# Patient Record
Sex: MALE | Race: WHITE | NOT HISPANIC OR LATINO | Employment: OTHER | URBAN - METROPOLITAN AREA
[De-identification: names, ages, dates, MRNs, and addresses within clinical notes are randomized per-mention and may not be internally consistent; named-entity substitution may affect disease eponyms.]

---

## 2017-01-26 ENCOUNTER — LAB (OUTPATIENT)
Dept: INTERNAL MEDICINE | Facility: CLINIC | Age: 62
End: 2017-01-26

## 2017-01-26 DIAGNOSIS — Z12.5 PROSTATE CANCER SCREENING: ICD-10-CM

## 2017-01-26 DIAGNOSIS — Z00.00 HEALTHCARE MAINTENANCE: ICD-10-CM

## 2017-01-26 DIAGNOSIS — R73.01 IMPAIRED FASTING GLUCOSE: Primary | ICD-10-CM

## 2017-01-26 LAB
ARTICHOKE IGE QN: 117 MG/DL (ref 0–130)
BASOPHILS # BLD AUTO: 0.03 10*3/MM3 (ref 0–0.2)
BASOPHILS NFR BLD AUTO: 0.4 % (ref 0–1)
BILIRUB UR QL STRIP: NEGATIVE
CHOLEST SERPL-MCNC: 197 MG/DL (ref 0–200)
CLARITY UR: CLEAR
COLOR UR: YELLOW
DEPRECATED RDW RBC AUTO: 43.8 FL (ref 37–54)
EOSINOPHIL # BLD AUTO: 0.19 10*3/MM3 (ref 0.1–0.3)
EOSINOPHIL NFR BLD AUTO: 2.6 % (ref 0–3)
ERYTHROCYTE [DISTWIDTH] IN BLOOD BY AUTOMATED COUNT: 12.9 % (ref 11.3–14.5)
GLUCOSE UR STRIP-MCNC: NEGATIVE MG/DL
HBA1C MFR BLD: 5.4 % (ref 4.8–5.6)
HCT VFR BLD AUTO: 49.2 % (ref 38.9–50.9)
HCV AB SER DONR QL: NORMAL
HDLC SERPL-MCNC: 41 MG/DL (ref 40–60)
HGB BLD-MCNC: 16.4 G/DL (ref 13.1–17.5)
HGB UR QL STRIP.AUTO: NEGATIVE
IMM GRANULOCYTES # BLD: 0.02 10*3/MM3 (ref 0–0.03)
IMM GRANULOCYTES NFR BLD: 0.3 % (ref 0–0.6)
KETONES UR QL STRIP: NEGATIVE
LEUKOCYTE ESTERASE UR QL STRIP.AUTO: NEGATIVE
LYMPHOCYTES # BLD AUTO: 1.48 10*3/MM3 (ref 0.6–4.8)
LYMPHOCYTES NFR BLD AUTO: 20.5 % (ref 24–44)
MCH RBC QN AUTO: 31.1 PG (ref 27–31)
MCHC RBC AUTO-ENTMCNC: 33.3 G/DL (ref 32–36)
MCV RBC AUTO: 93.2 FL (ref 80–99)
MONOCYTES # BLD AUTO: 0.88 10*3/MM3 (ref 0–1)
MONOCYTES NFR BLD AUTO: 12.2 % (ref 0–12)
NEUTROPHILS # BLD AUTO: 4.61 10*3/MM3 (ref 1.5–8.3)
NEUTROPHILS NFR BLD AUTO: 64 % (ref 41–71)
NITRITE UR QL STRIP: NEGATIVE
PH UR STRIP.AUTO: 6.5 [PH] (ref 5–8)
PLATELET # BLD AUTO: 157 10*3/MM3 (ref 150–450)
PMV BLD AUTO: 11.7 FL (ref 6–12)
PROT UR QL STRIP: NEGATIVE
PSA SERPL-MCNC: 0.3 NG/ML (ref 0–4)
RBC # BLD AUTO: 5.28 10*6/MM3 (ref 4.2–5.76)
SP GR UR STRIP: <=1.005 (ref 1–1.03)
TRIGL SERPL-MCNC: 242 MG/DL (ref 0–150)
TSH SERPL DL<=0.05 MIU/L-ACNC: 3.43 MIU/ML (ref 0.35–5.35)
UROBILINOGEN UR QL STRIP: NORMAL
WBC NRBC COR # BLD: 7.21 10*3/MM3 (ref 3.5–10.8)

## 2017-01-26 PROCEDURE — 86803 HEPATITIS C AB TEST: CPT | Performed by: INTERNAL MEDICINE

## 2017-01-26 PROCEDURE — 81003 URINALYSIS AUTO W/O SCOPE: CPT | Performed by: INTERNAL MEDICINE

## 2017-01-26 PROCEDURE — 85025 COMPLETE CBC W/AUTO DIFF WBC: CPT | Performed by: INTERNAL MEDICINE

## 2017-01-26 PROCEDURE — 36415 COLL VENOUS BLD VENIPUNCTURE: CPT | Performed by: INTERNAL MEDICINE

## 2017-01-26 PROCEDURE — 80061 LIPID PANEL: CPT | Performed by: INTERNAL MEDICINE

## 2017-01-26 PROCEDURE — 84443 ASSAY THYROID STIM HORMONE: CPT | Performed by: INTERNAL MEDICINE

## 2017-01-26 PROCEDURE — G0103 PSA SCREENING: HCPCS | Performed by: INTERNAL MEDICINE

## 2017-01-26 PROCEDURE — 83036 HEMOGLOBIN GLYCOSYLATED A1C: CPT | Performed by: INTERNAL MEDICINE

## 2017-01-26 PROCEDURE — 80053 COMPREHEN METABOLIC PANEL: CPT | Performed by: INTERNAL MEDICINE

## 2017-01-26 NOTE — MR AVS SNAPSHOT
Yoseph rGanados   1/26/2017 8:00 AM   Lab    Dept Phone:  [phone]   Encounter #:  [id number]    Provider:  [facility]   Department:  [facility] [place]                Your Full Care Plan              Your Updated Medication List          This list is accurate as of: 1/26/17  8:00 AM.  Always use your most recent med list.                aspirin 81 MG tablet       CENTRUM SILVER ADULT 50+ PO       Cinnamon 500 MG tablet       fexofenadine 180 MG tablet   Commonly known as:  ALLEGRA       ibuprofen 600 MG tablet   Commonly known as:  ADVIL,MOTRIN       Omega-3 Krill Oil 1000 MG capsule       Vitamin D 2000 UNITS capsule               We Performed the Following     CBC & Differential     CBC Auto Differential     Hemoglobin A1c     Hepatitis C Antibody     Lipid Panel     PSA Screen     TSH     Urinalysis With / Microscopic If Indicated       You Were Diagnosed With        Codes Comments    Impaired fasting glucose    -  Primary ICD-10-CM: R73.01  ICD-9-CM: 790.21     Prostate cancer screening     ICD-10-CM: Z12.5  ICD-9-CM: V76.44     Healthcare maintenance     ICD-10-CM: Z00.00  ICD-9-CM: V70.0       Instructions     None    Patient Instructions History      Upcoming Appointments     Visit Type Date Time Department    LAB 1/26/2017  8:00 AM [facility]    [facility] 1/31/2017  9:30 [facility]      MyChart Signup     Our records indicate that you have declined [facility] signup. If you would like to sign up for [employer], please email [email or web address] or call [phone] to obtain an activation code.             Other Info from Your Visit           Your Appointments     Jan 31, 2017  9:30 AM EST   Physical with Gina Adamson MD   [facility] [place] (--)    [address] [address] [phone]           Arrive 15 minutes prior to appointment.                 Allergies     Cosamin Ds [Glucosamine-chondroitin]  Hives    Naproxen      Sulfa Antibiotics  Hives      Vital Signs     Smoking Status                   Never Smoker           Problems and Diagnoses Noted     Increased fasting blood sugar    Screening for prostate cancer        Routine medical exam          Results

## 2017-01-30 LAB
ALBUMIN SERPL-MCNC: 3.9 G/DL (ref 3.2–4.8)
ALBUMIN/GLOB SERPL: 2.2 G/DL (ref 1.5–2.5)
ALP SERPL-CCNC: 60 U/L (ref 25–100)
ALT SERPL W P-5'-P-CCNC: 18 U/L (ref 7–40)
ANION GAP SERPL CALCULATED.3IONS-SCNC: 15 MMOL/L (ref 3–11)
AST SERPL-CCNC: 24 U/L (ref 0–33)
BILIRUB SERPL-MCNC: 0.6 MG/DL (ref 0.3–1.2)
BUN BLD-MCNC: 19 MG/DL (ref 9–23)
BUN/CREAT SERPL: 19 (ref 7–25)
CALCIUM SPEC-SCNC: 9.5 MG/DL (ref 8.7–10.4)
CHLORIDE SERPL-SCNC: 105 MMOL/L (ref 99–109)
CO2 SERPL-SCNC: 26 MMOL/L (ref 20–31)
CREAT BLD-MCNC: 1 MG/DL (ref 0.6–1.3)
GFR SERPL CREATININE-BSD FRML MDRD: 75 ML/MIN/1.73
GLOBULIN UR ELPH-MCNC: 1.8 GM/DL
GLUCOSE BLD-MCNC: 95 MG/DL (ref 70–100)
POTASSIUM BLD-SCNC: 4.9 MMOL/L (ref 3.5–5.5)
PROT SERPL-MCNC: 5.7 G/DL (ref 5.7–8.2)
SODIUM BLD-SCNC: 146 MMOL/L (ref 132–146)

## 2017-01-31 ENCOUNTER — OFFICE VISIT (OUTPATIENT)
Dept: INTERNAL MEDICINE | Facility: CLINIC | Age: 62
End: 2017-01-31

## 2017-01-31 VITALS
HEIGHT: 69 IN | DIASTOLIC BLOOD PRESSURE: 74 MMHG | SYSTOLIC BLOOD PRESSURE: 118 MMHG | WEIGHT: 187 LBS | BODY MASS INDEX: 27.7 KG/M2

## 2017-01-31 DIAGNOSIS — R73.01 IMPAIRED FASTING GLUCOSE: ICD-10-CM

## 2017-01-31 DIAGNOSIS — M25.551 RIGHT HIP PAIN: ICD-10-CM

## 2017-01-31 DIAGNOSIS — E78.5 DYSLIPIDEMIA: ICD-10-CM

## 2017-01-31 DIAGNOSIS — Z00.00 PE (PHYSICAL EXAM), ROUTINE: Primary | ICD-10-CM

## 2017-01-31 PROCEDURE — 99396 PREV VISIT EST AGE 40-64: CPT | Performed by: INTERNAL MEDICINE

## 2017-01-31 PROCEDURE — 93000 ELECTROCARDIOGRAM COMPLETE: CPT | Performed by: INTERNAL MEDICINE

## 2017-01-31 NOTE — ASSESSMENT & PLAN NOTE
Health maintenance - flu vacc 12/16; Tdap 9/13, Zostavax 12/16; plan for pneumonia vaccines at age 63; colonosc due with Dr. George (last 6/12) - patient will call himself; defer FARNAZ with pending colonosc; eye exam UTD 4/16, done yearly with Dr. Aragon; dental exam UTD with every 6 mos cleanings, currently with f/u for dental implants

## 2017-01-31 NOTE — PROGRESS NOTES
Chief Complaint   Patient presents with   • Annual Exam       History of Present Illness  61 y.o.  gentleman presents for updated phys examination.    Review of Systems  Denies headaches, , CP, palpitations, SOB, cough, abd pain, n/v/d, difficulty with urination, numbness/tingling, falls, mood changes, lightheadedness, hearing changes, rashes.    ROS (+) for cont'd right hip and posterior thigh pain. Notes still worsened sxs with driving or with sitting with legs crossed.  Denies radiation of pain down into the foot and denies numbness/tingling associated.  Denies injuries or falls.  Notes no significant improvement with PT and dry needling.  Notes sxs more frequent and takes less to trigger now.  Reports weight gain, attributed to decreased exercise over the last 6 months.    ROS (+) for recent dental implant and has cont'd follow-up, next appt this afternoon.    Goes to the eye doctor yearly and they have been watching his eyelids, consider surgery for the future.     All other ROS reviewed and negative.    [facility]  The following portions of the patient's history were reviewed and updated as appropriate: allergies, current medications, past family history, past medical history, past social history, past surgical history and problem list.      Current Outpatient Prescriptions:   •  aspirin 81 MG tablet, Take 1 tablet by mouth daily., Disp: , Rfl:   •  Cholecalciferol (VITAMIN D) 2000 UNITS capsule, Take 1 capsule by mouth daily., Disp: , Rfl:   •  Cinnamon 500 MG tablet, Take 1 tablet by mouth daily., Disp: , Rfl:   •  fexofenadine (ALLEGRA) 180 MG tablet, Take 180 mg by mouth Daily., Disp: , Rfl:   •  ibuprofen (ADVIL,MOTRIN) 600 MG tablet, Take 1 tablet by mouth 2 (two) times a day as needed., Disp: , Rfl:   •  Multiple Vitamins-Minerals (CENTRUM SILVER ADULT 50+ PO), Take 1 tablet by mouth daily., Disp: , Rfl:   •  Omega-3 Krill Oil 1000 MG capsule, Take 1 tablet by mouth daily., Disp: , Rfl:     Visit  "Vitals   • /74   • Ht 69\" (175.3 cm)   • Wt 187 lb (84.8 kg)   • BMI 27.62 kg/m2       Physical Exam   Constitutional: He is oriented to person, place, and time. He appears well-developed and well-nourished.   HENT:   Head: Normocephalic.   Right Ear: Tympanic membrane normal.   Left Ear: Tympanic membrane normal.   Nose: Nose normal.   Mouth/Throat: Oropharynx is clear and moist and mucous membranes are normal. No oropharyngeal exudate.   Eyes: Conjunctivae are normal. Pupils are equal, round, and reactive to light.   Medial deviation left eye; mild upper lid ptosis bilaterally   Neck: Normal range of motion. Neck supple. Carotid bruit is not present. No thyromegaly present.   Cardiovascular: Normal rate, regular rhythm and normal heart sounds.    Pulmonary/Chest: Effort normal and breath sounds normal. No respiratory distress.   Abdominal: Soft. Bowel sounds are normal. He exhibits no distension and no mass. There is no hepatosplenomegaly. There is no tenderness.   Genitourinary:   Genitourinary Comments: /FARNAZ deferred as will get colonosc later this year   Musculoskeletal: Normal range of motion. He exhibits no edema or tenderness (no tend with palpation of the greater trochanters bilaterally).   bilat hips FROM; mild straight leg test on the right, asx on left; also right buttock/posterior thigh pain with crossed leg (right foot on left knee) and downwards pressure on the knee   Lymphadenopathy:     He has no cervical adenopathy.   Neurological: He is alert and oriented to person, place, and time. He has normal strength and normal reflexes. He displays normal reflexes. No cranial nerve deficit or sensory deficit.   Skin: Skin is warm and dry. No rash noted.   Psychiatric: He has a normal mood and affect. His behavior is normal.   Nursing note and vitals reviewed.        LABS  Results for orders placed or performed in visit on 01/26/17   Lipid Panel   Result Value Ref Range    Total Cholesterol 197 0 - " 200 mg/dL    Triglycerides 242 (H) 0 - 150 mg/dL    HDL Cholesterol 41 40 - 60 mg/dL    LDL Cholesterol  117 0 - 130 mg/dL   PSA Screen   Result Value Ref Range    PSA 0.300 0.000 - 4.000 ng/mL   TSH   Result Value Ref Range    TSH 3.429 0.350 - 5.350 mIU/mL   Urinalysis With / Microscopic If Indicated   Result Value Ref Range    Color, UA Yellow Yellow, Straw    Appearance, UA Clear Clear    pH, UA 6.5 5.0 - 8.0    Specific Gravity, UA <=1.005 1.005 - 1.030    Glucose, UA Negative Negative    Ketones, UA Negative Negative    Bilirubin, UA Negative Negative    Blood, UA Negative Negative    Protein, UA Negative Negative    Leuk Esterase, UA Negative Negative    Nitrite, UA Negative Negative    Urobilinogen, UA 0.2 E.U./dL 0.2 - 1.0 E.U./dL   Hemoglobin A1c   Result Value Ref Range    Hemoglobin A1C 5.40 4.80 - 5.60 %   Hepatitis C Antibody   Result Value Ref Range    Hepatitis C Ab Non-Reactive Non-Reactive   CBC Auto Differential   Result Value Ref Range    WBC 7.21 3.50 - 10.80 10*3/mm3    RBC 5.28 4.20 - 5.76 10*6/mm3    Hemoglobin 16.4 13.1 - 17.5 g/dL    Hematocrit 49.2 38.9 - 50.9 %    MCV 93.2 80.0 - 99.0 fL    MCH 31.1 (H) 27.0 - 31.0 pg    MCHC 33.3 32.0 - 36.0 g/dL    RDW 12.9 11.3 - 14.5 %    RDW-SD 43.8 37.0 - 54.0 fl    MPV 11.7 6.0 - 12.0 fL    Platelets 157 150 - 450 10*3/mm3    Neutrophil % 64.0 41.0 - 71.0 %    Lymphocyte % 20.5 (L) 24.0 - 44.0 %    Monocyte % 12.2 (H) 0.0 - 12.0 %    Eosinophil % 2.6 0.0 - 3.0 %    Basophil % 0.4 0.0 - 1.0 %    Immature Grans % 0.3 0.0 - 0.6 %    Neutrophils, Absolute 4.61 1.50 - 8.30 10*3/mm3    Lymphocytes, Absolute 1.48 0.60 - 4.80 10*3/mm3    Monocytes, Absolute 0.88 0.00 - 1.00 10*3/mm3    Eosinophils, Absolute 0.19 0.10 - 0.30 10*3/mm3    Basophils, Absolute 0.03 0.00 - 0.20 10*3/mm3    Immature Grans, Absolute 0.02 0.00 - 0.03 10*3/mm3   Comprehensive Metabolic Panel   Result Value Ref Range    Glucose 95 70 - 100 mg/dL    BUN 19 9 - 23 mg/dL    Creatinine  1.00 0.60 - 1.30 mg/dL    Sodium 146 132 - 146 mmol/L    Potassium 4.9 3.5 - 5.5 mmol/L    Chloride 105 99 - 109 mmol/L    CO2 26.0 20.0 - 31.0 mmol/L    Calcium 9.5 8.7 - 10.4 mg/dL    Total Protein 5.7 5.7 - 8.2 g/dL    Albumin 3.90 3.20 - 4.80 g/dL    ALT (SGPT) 18 7 - 40 U/L    AST (SGOT) 24 0 - 33 U/L    Alkaline Phosphatase 60 25 - 100 U/L    Total Bilirubin 0.6 0.3 - 1.2 mg/dL    eGFR Non African Amer 75 >60 mL/min/1.73    Globulin 1.8 gm/dL    A/G Ratio 2.2 1.5 - 2.5 g/dL    BUN/Creatinine Ratio 19.0 7.0 - 25.0    Anion Gap 15.0 (H) 3.0 - 11.0 mmol/L       ECG 12 Lead  Date/Time: 1/31/2017 10:15 AM  Performed by: CHERISE THOMPSON  Authorized by: CHERISE THOMPSON   Comparison: compared with previous ECG from 9/11/2015  Comparison to previous ECG: New IRBBB, note some artifact  Rhythm: sinus rhythm  Rate: normal  BPM: 70  Conduction: incomplete RBBB  ST Segments: ST segments normal  T Waves: T waves normal  QRS axis: normal  Clinical impression: non-specific ECG          ASSESSMENT/PLAN  Problem List Items Addressed This Visit     Impaired fasting glucose     BG control remains good with A1C 5.4; encouraged reg phys activity to decr insulin resistance, moderation in unhealthy starches/sweets; f/u A1C in 6 mos           Dyslipidemia     Bord lipids with worsened TGs but stable ; decr saturated fats/chol in the diet; repeat lipids in 6 mos         Relevant Orders    ECG 12 Lead    PE (physical exam), routine - Primary     Health maintenance - flu vacc 12/16; Tdap 9/13, Zostavax 12/16; plan for pneumonia vaccines at age 63; colonosc due with Dr. George (last 6/12) - patient will call himself; defer FARNAZ with pending colonosc; eye exam UTD 4/16, done yearly with Dr. Aragon; dental exam UTD with every 6 mos cleanings, currently with f/u for dental implants         Right hip pain     Ongoing sxs radiating into right thigh; s/p course of PT with dry needling without significant improvement; notes also component  of sciatica; rec prn ibuprofen; reviewed role of turmeric; proceed with imaging due to persistent/worsened sxs; patient opts to proceed with right hip MRI and then consider ortho consultation thereafter               FOLLOW-UP  1. Health maintenance - by his f/uappt, if colonosc not updated, will remind him to update colonosc with Dr. George  2. RTC 6 mos with A1C, lipids    Electronically signed by:    Gina Adamson MD  01/31/2017

## 2017-01-31 NOTE — ASSESSMENT & PLAN NOTE
Bord lipids with worsened TGs but stable ; decr saturated fats/chol in the diet; repeat lipids in 6 mos

## 2017-01-31 NOTE — MR AVS SNAPSHOT
Yoseph Granados   1/31/2017 9:30 AM   Office Visit    Dept Phone:  [phone]   Encounter #:  [id number]    Provider:  Gina Adamson MD   Department:  [facility]                Your Full Care Plan              Your Updated Medication List          This list is accurate as of: 1/31/17 10:37 AM.  Always use your most recent med list.                aspirin 81 MG tablet       CENTRUM SILVER ADULT 50+ PO       Cinnamon 500 MG tablet       fexofenadine 180 MG tablet   Commonly known as:  ALLEGRA       ibuprofen 600 MG tablet   Commonly known as:  ADVIL,MOTRIN       Omega-3 Krill Oil 1000 MG capsule       Vitamin D 2000 UNITS capsule               We Performed the Following     ECG 12 Lead       You Were Diagnosed With        Codes Comments    PE (physical exam), routine    -  Primary ICD-10-CM: Z00.00  ICD-9-CM: V70.0     Impaired fasting glucose     ICD-10-CM: R73.01  ICD-9-CM: 790.21     Dyslipidemia     ICD-10-CM: E78.5  ICD-9-CM: 272.4     Right hip pain     ICD-10-CM: M25.551  ICD-9-CM: 719.45       Instructions     None    Patient Instructions History      Upcoming Appointments     Visit Type Date Time Department    PHYSICAL 1/31/2017  9:30 AM [facility]    FOLLOW UP 7/28/2017 10:15 AM [facility]      MyChart Signup     Our records indicate that you have declined [facility] signup. If you would like to sign up for [facility], please email [facility] ([email or web address] or call [phone] to obtain an activation code.             Other Info from Your Visit           Your Appointments     Jul 28, 2017 10:15 AM EDT   Follow Up with Gina Adamson MD   [facility] [place] (--)    [address] [id number] [phone]           Arrive 15 minutes prior to appointment.            Feb 05, 2018  9:30 AM EST   Physical with Gina Adamson MD   [facility] (--)    [address] [facility]  [facility] [address] [phone]           Arrive 15 minutes prior to appointment.              Allergies     Cosamin Ds [Glucosamine-chondroitin]  Hives    Naproxen      Sulfa Antibiotics  Hives      Reason for Visit     Annual Exam           Vital Signs     Blood Pressure Height Weight Body Mass Index Smoking Status       118/74 69\" (175.3 cm) 187 lb (84.8 kg) 27.62 kg/m2 Never Smoker       Problems and Diagnoses Noted     Dyslipidemia    Increased fasting blood sugar    Routine medical exam    Right hip pain        "

## 2017-01-31 NOTE — ASSESSMENT & PLAN NOTE
BG control remains good with A1C 5.4; encouraged reg phys activity to decr insulin resistance, moderation in unhealthy starches/sweets; f/u A1C in 6 mos

## 2017-01-31 NOTE — ASSESSMENT & PLAN NOTE
Ongoing sxs radiating into right thigh; s/p course of PT with dry needling without significant improvement; notes also component of sciatica; rec prn ibuprofen; reviewed role of turmeric; proceed with imaging due to persistent/worsened sxs; patient opts to proceed with right hip MRI and then consider ortho consultation thereafter

## 2017-02-03 DIAGNOSIS — M25.551 RIGHT HIP PAIN: Primary | ICD-10-CM

## 2017-03-24 ENCOUNTER — OFFICE VISIT (OUTPATIENT)
Dept: INTERNAL MEDICINE | Facility: CLINIC | Age: 62
End: 2017-03-24

## 2017-03-24 ENCOUNTER — TELEPHONE (OUTPATIENT)
Dept: INTERNAL MEDICINE | Facility: CLINIC | Age: 62
End: 2017-03-24

## 2017-03-24 ENCOUNTER — HOSPITAL ENCOUNTER (OUTPATIENT)
Dept: CT IMAGING | Facility: HOSPITAL | Age: 62
Discharge: HOME | End: 2017-03-24
Attending: INTERNAL MEDICINE | Admitting: INTERNAL MEDICINE

## 2017-03-24 VITALS
TEMPERATURE: 97.6 F | DIASTOLIC BLOOD PRESSURE: 74 MMHG | RESPIRATION RATE: 12 BRPM | HEART RATE: 120 BPM | SYSTOLIC BLOOD PRESSURE: 120 MMHG | WEIGHT: 186 LBS | BODY MASS INDEX: 27.55 KG/M2 | OXYGEN SATURATION: 95 % | HEIGHT: 69 IN

## 2017-03-24 DIAGNOSIS — R06.02 SHORTNESS OF BREATH: Primary | ICD-10-CM

## 2017-03-24 DIAGNOSIS — R06.02 SHORTNESS OF BREATH: ICD-10-CM

## 2017-03-24 DIAGNOSIS — J06.9 VIRAL UPPER RESPIRATORY TRACT INFECTION: ICD-10-CM

## 2017-03-24 LAB
ANION GAP SERPL CALCULATED.3IONS-SCNC: 3 MMOL/L (ref 3–11)
BASOPHILS # BLD AUTO: 0.02 10*3/MM3 (ref 0–0.2)
BASOPHILS NFR BLD AUTO: 0.2 % (ref 0–1)
BUN BLD-MCNC: 27 MG/DL (ref 9–23)
BUN/CREAT SERPL: 22.5 (ref 7–25)
CALCIUM SPEC-SCNC: 9.1 MG/DL (ref 8.7–10.4)
CHLORIDE SERPL-SCNC: 103 MMOL/L (ref 99–109)
CO2 SERPL-SCNC: 29 MMOL/L (ref 20–31)
CREAT BLD-MCNC: 1.2 MG/DL (ref 0.6–1.3)
D DIMER PPP FEU-MCNC: 0.65 MG/L (FEU) (ref 0–0.5)
DEPRECATED RDW RBC AUTO: 44.6 FL (ref 37–54)
EOSINOPHIL # BLD AUTO: 0.01 10*3/MM3 (ref 0.1–0.3)
EOSINOPHIL NFR BLD AUTO: 0.1 % (ref 0–3)
ERYTHROCYTE [DISTWIDTH] IN BLOOD BY AUTOMATED COUNT: 13 % (ref 11.3–14.5)
GFR SERPL CREATININE-BSD FRML MDRD: 61 ML/MIN/1.73
GLUCOSE BLD-MCNC: 155 MG/DL (ref 70–100)
HCT VFR BLD AUTO: 57 % (ref 38.9–50.9)
HGB BLD-MCNC: 19.5 G/DL (ref 13.1–17.5)
IMM GRANULOCYTES # BLD: 0.04 10*3/MM3 (ref 0–0.03)
IMM GRANULOCYTES NFR BLD: 0.4 % (ref 0–0.6)
LYMPHOCYTES # BLD AUTO: 1.1 10*3/MM3 (ref 0.6–4.8)
LYMPHOCYTES NFR BLD AUTO: 9.8 % (ref 24–44)
MCH RBC QN AUTO: 31.9 PG (ref 27–31)
MCHC RBC AUTO-ENTMCNC: 34.2 G/DL (ref 32–36)
MCV RBC AUTO: 93.1 FL (ref 80–99)
MONOCYTES # BLD AUTO: 1.11 10*3/MM3 (ref 0–1)
MONOCYTES NFR BLD AUTO: 9.9 % (ref 0–12)
NEUTROPHILS # BLD AUTO: 8.96 10*3/MM3 (ref 1.5–8.3)
NEUTROPHILS NFR BLD AUTO: 79.6 % (ref 41–71)
PLATELET # BLD AUTO: 219 10*3/MM3 (ref 150–450)
PMV BLD AUTO: 12.7 FL (ref 6–12)
POTASSIUM BLD-SCNC: 4.8 MMOL/L (ref 3.5–5.5)
RBC # BLD AUTO: 6.12 10*6/MM3 (ref 4.2–5.76)
SODIUM BLD-SCNC: 135 MMOL/L (ref 132–146)
WBC NRBC COR # BLD: 11.24 10*3/MM3 (ref 3.5–10.8)

## 2017-03-24 PROCEDURE — 93000 ELECTROCARDIOGRAM COMPLETE: CPT | Performed by: INTERNAL MEDICINE

## 2017-03-24 PROCEDURE — 71275 CT ANGIOGRAPHY CHEST: CPT

## 2017-03-24 PROCEDURE — 82565 ASSAY OF CREATININE: CPT

## 2017-03-24 PROCEDURE — 80048 BASIC METABOLIC PNL TOTAL CA: CPT | Performed by: INTERNAL MEDICINE

## 2017-03-24 PROCEDURE — 99214 OFFICE O/P EST MOD 30 MIN: CPT | Performed by: INTERNAL MEDICINE

## 2017-03-24 PROCEDURE — 85025 COMPLETE CBC W/AUTO DIFF WBC: CPT | Performed by: INTERNAL MEDICINE

## 2017-03-24 PROCEDURE — 0 IOPAMIDOL PER 1 ML: Performed by: INTERNAL MEDICINE

## 2017-03-24 PROCEDURE — 85379 FIBRIN DEGRADATION QUANT: CPT | Performed by: INTERNAL MEDICINE

## 2017-03-24 RX ORDER — CEFDINIR 300 MG/1
300 CAPSULE ORAL 2 TIMES DAILY
Qty: 14 CAPSULE | Refills: 0 | Status: SHIPPED | OUTPATIENT
Start: 2017-03-24 | End: 2017-03-31

## 2017-03-24 RX ADMIN — IOPAMIDOL 75 ML: 755 INJECTION, SOLUTION INTRAVENOUS at 15:35

## 2017-03-24 NOTE — TELEPHONE ENCOUNTER
----- Message from Janie Chavez sent at 3/24/2017 10:52 AM EDT -----  Contact: SPOUSE  MRS PERDUE HAS CALLED ONCE AGAIN TO SEE IF HER  CAN BE SEEN TODAY  ----- Message -----     From: Janie Chavez     Sent: 3/24/2017   9:06 AM       To: Shaneka Rao MA    RE: APPT TODAY    MRS PERDUE WOULD LIKE TO KNOW IF MR PERDUE CAN POSSIBLY BE SEEN TODAY.    HE IS C/O SHORTNESS OF BREATH, PANTING, SINUS SYMPTOMS, CHILLS, PAIN IN BACK OF HEAD. PLEASE RETURN CALL.    CALL BACK #[phone] CELL

## 2017-03-24 NOTE — ASSESSMENT & PLAN NOTE
Concerning for development of acute shortness of breath this morning with dyspnea on exertion; pulse ox, EKG and exam unremarkable except for sinus tachycardia; check labs including d-dimer on the way out the door but proceed with CT chest to rule out pulmonary embolus; will treat accordingly if he has pneumonia on imaging

## 2017-03-24 NOTE — PROGRESS NOTES
Chief Complaint   Patient presents with   • Dizziness, sinus pressure and drainage, shortness of breath       History of Present Illness  62 y.o.  gentleman, accompanied by his wife, presents for further eval of cold sxs.  HPI started on Monday with sneezing, runny nose, and watery eyes.  Has been taking nyquil, dayquil, and benadryl.  This morning awakened developed numbness at the back of the head with dizziness, lightheadedness, and nausea.  Notes breathlessness with trying to fix breakfast.  Denies spinning dizziness but feels unsteady, but not quite like passing out feeling.  Has not passed out.  Heavy breathing is worsened with walking, better when sitting. Notes only very little cough. Notes runny nose is minimal today.  Has lower voice today.  Denies fevers, abd pain, CP, palpitations, diarrhea.  Notes occ feeling ear fullness on one side or the other; inconsistent.     Notes getting breathless/winded just with walking from lobby to exam room, which is out of the ordinary.  Was not symptomatic like this yesterday.    Denies any recent long trips or car rides.  Has not taking any OTC decongestants.  Reports plain allegra.    Review of Systems  ROS (+) for SOB, RIDDLE with slight cough.  Not coughing up blood or mucus.  Denies fevers, chills, CP, palpitations.  ROS (+) for dizziness, lightheadedness, discomfort at the back of the head, not feeling well in general.  Notes only slight sore throat, runny nose, congestion, ear fullness, not severely symptomatic at this time.  All other ROS reviewed and negative.    [facility]  The following portions of the patient's history were reviewed and updated as appropriate: allergies, current medications, past family history, past medical history, past social history, past surgical history and problem list.      Current Outpatient Prescriptions:   •  aspirin 81 MG tablet, Take 1 tablet by mouth daily., Disp: , Rfl:   •  Cholecalciferol (VITAMIN D) 2000 UNITS capsule, Take  "1 capsule by mouth daily., Disp: , Rfl:   •  Cinnamon 500 MG tablet, Take 1 tablet by mouth daily., Disp: , Rfl:   •  fexofenadine (ALLEGRA) 180 MG tablet, Take 180 mg by mouth Daily., Disp: , Rfl:   •  ibuprofen (ADVIL,MOTRIN) 600 MG tablet, Take 1 tablet by mouth 2 (two) times a day as needed., Disp: , Rfl:   •  Multiple Vitamins-Minerals (CENTRUM SILVER ADULT 50+ PO), Take 1 tablet by mouth daily., Disp: , Rfl:   •  Omega-3 Krill Oil 1000 MG capsule, Take 1 tablet by mouth daily., Disp: , Rfl:     VITALS:  /74  Pulse 120  Temp 97.6 °F (36.4 °C)  Resp 12  Ht 69\" (175.3 cm)  Wt 186 lb (84.4 kg)  SpO2 95%  BMI 27.47 kg/m2    Physical Exam   Constitutional: He is oriented to person, place, and time. He appears well-developed and well-nourished.   HENT:   Right Ear: External ear normal.   Left Ear: External ear normal.   Mouth/Throat: No oropharyngeal exudate (posterior drainage without exudate, erythema, or swelling).   Nasal mucosa mod swollen bilaterally, pale; bilat TMs intact   Eyes: Conjunctivae are normal.   Wears glasses   Neck: Normal range of motion.   Cardiovascular: Regular rhythm and normal heart sounds.  Tachycardia present.    Rate 120   Pulmonary/Chest: Effort normal and breath sounds normal. No respiratory distress. He has no wheezes. He has no rales.   Speaks in complete sentences   Abdominal: Soft. Bowel sounds are normal.   Musculoskeletal: He exhibits tenderness (mild tenderness and spasm with palpation paraspinal cervical muscles bilaterally; no point tenderness with palpa of the c-spine vertebrae; neck FROM).   Lymphadenopathy:     He has no cervical adenopathy.   Neurological: He is alert and oriented to person, place, and time.   Psychiatric: He has a normal mood and affect. His behavior is normal.   Nursing note and vitals reviewed.    LABS  1/17 stable labs Hct 49.2      ECG 12 Lead  Date/Time: 3/24/2017 1:42 PM  Performed by: CHERISE THOMPSON  Authorized by: CHERISE THOMPSON "   Comparison: compared with previous ECG from 1/31/2017  Similar to previous ECG  Comparison to previous ECG: Except now tachycardic  Rhythm: sinus tachycardia  Rate: normal  BPM: 105  Conduction: conduction normal  Conduction: incomplete RBBB  ST Segments: ST segments normal  T Waves: T waves normal  QRS axis: normal  Other findings comments: nonspecific ST-T Changes, unchanged  Clinical impression comment: stable EKG except tachycardic              ASSESSMENT/PLAN  Problem List Items Addressed This Visit     Shortness of breath - Primary     Concerning for development of acute shortness of breath this morning with dyspnea on exertion; pulse ox, EKG and exam unremarkable except for sinus tachycardia; check labs including d-dimer on the way out the door but proceed with CT chest to rule out pulmonary embolus; will treat accordingly if he has pneumonia on imaging         Relevant Orders    CBC & Differential    D-dimer, Quantitative    Basic Metabolic Panel    CT Angiogram Chest With & Without Contrast    CBC Auto Differential      Other Visit Diagnoses     Viral upper respiratory tract infection        minimal sxs at this time; remains on allegra; drink plenty of fluids          FOLLOW-UP  1.  High acuity of care with concern regarding possible pulmonary embolism  2.  RTC depending on laboratory and CT chest results; PE was just done 1/31/17    Electronically signed by:    Gina Adamson MD  03/24/2017

## 2017-03-24 NOTE — TELEPHONE ENCOUNTER
· Called patient to notify him of chest CT results:     IMPRESSION:  1. No acute intrathoracic abnormality identified. There is an incidental  small pericardial effusion. Old healed granulomatous disease seen within  the chest.  2. No evidence of pulmonary embolism.    · Also reviewed lab results: stable BMP, bord D-dimer  WBC 3.50 - 10.80 10*3/mm3 11.24 (H)   RBC 4.20 - 5.76 10*6/mm3 6.12 (H)   Hemoglobin 13.1 - 17.5 g/dL 19.5 (H)   Hematocrit 38.9 - 50.9 % 57.0 (H)   MCV 80.0 - 99.0 fL 93.1   MCH 27.0 - 31.0 pg 31.9 (H)   MCHC 32.0 - 36.0 g/dL 34.2   RDW 11.3 - 14.5 % 13.0   RDW-SD 37.0 - 54.0 fl 44.6   MPV 6.0 - 12.0 fL 12.7 (H)   Platelets 150 - 450 10*3/mm3 219     · Patient states that he is feeling better at this moment; has been drinking lot of water.  Has less SOB and less dizziness.  Still has some heaviness at the back of the head but is decreased since this morning.  No other new sxs, including no headaches, visual changes, numbness/tingling or weakness in the arms/legs, confusion, fainting.  · Will empirically tx bronchitis/sinusitis with omnicef 300mg BID x 7 days (note bord WBC with 80% neutrophils)  · Continue to drink lots of water  · Discussed going to the ER should dizziness or posterior head heaviness worsens over the weekend; also if develops new neurologic sxs  · If continues to improve, instructed patient to call office on Monday to make a follow-up appointment    Patient verbalized understanding and had no other questions/concerns.

## 2017-03-27 ENCOUNTER — TELEPHONE (OUTPATIENT)
Dept: INTERNAL MEDICINE | Facility: CLINIC | Age: 62
End: 2017-03-27

## 2017-03-27 LAB — CREAT BLDA-MCNC: 1.4 MG/DL (ref 0.6–1.3)

## 2017-03-27 NOTE — TELEPHONE ENCOUNTER
----- Message from Gina Adamson MD sent at 3/27/2017  9:38 AM EDT -----  Contact: PATIENT   aggressive water intake; cont his allegra and add nasal steroid spray (flonase, nasacort, or rhinocort are OTC options) - 2sprays/nostril qam; nasal saline rinse 2-3x/day (not afrin)    Need f/u appt this week  ----- Message -----     From: Shaneka Avelar MA     Sent: 3/27/2017   8:52 AM       To: Gina Adamson MD        ----- Message -----     From: Janie Chavez     Sent: 3/27/2017   8:41 AM       To: Shaneka Avealr MA    MR SIMPSON CALLED THIS MORNING TO MAKE AN APPT THIS WEEK TO FOLLOW UP WITH DR ADAMSON. HE IS STILL C/O LIGHT HEADEDNESS AND CONGESTION. HE WOULD LIKE TO KNOW IF THERE IS ANYTHING HE SHOULD BE TAKING ALONG WITH THE ABX. HIS UPCOMING APPT IS 3/30/2017 @ 10:45 AM.    CALL BACK #[phone]

## 2017-03-30 ENCOUNTER — OFFICE VISIT (OUTPATIENT)
Dept: INTERNAL MEDICINE | Facility: CLINIC | Age: 62
End: 2017-03-30

## 2017-03-30 VITALS
WEIGHT: 172 LBS | DIASTOLIC BLOOD PRESSURE: 76 MMHG | HEIGHT: 69 IN | SYSTOLIC BLOOD PRESSURE: 118 MMHG | BODY MASS INDEX: 25.48 KG/M2

## 2017-03-30 DIAGNOSIS — R06.02 SHORTNESS OF BREATH: ICD-10-CM

## 2017-03-30 DIAGNOSIS — R79.89 ELEVATED SERUM CREATININE: ICD-10-CM

## 2017-03-30 DIAGNOSIS — R73.01 IMPAIRED FASTING GLUCOSE: ICD-10-CM

## 2017-03-30 DIAGNOSIS — E78.5 DYSLIPIDEMIA: ICD-10-CM

## 2017-03-30 DIAGNOSIS — J01.90 ACUTE NON-RECURRENT SINUSITIS, UNSPECIFIED LOCATION: Primary | ICD-10-CM

## 2017-03-30 DIAGNOSIS — J30.2 SEASONAL ALLERGIC RHINITIS, UNSPECIFIED ALLERGIC RHINITIS TRIGGER: ICD-10-CM

## 2017-03-30 PROCEDURE — 99213 OFFICE O/P EST LOW 20 MIN: CPT | Performed by: INTERNAL MEDICINE

## 2017-04-09 PROBLEM — M16.11 PRIMARY OSTEOARTHRITIS OF RIGHT HIP: Status: ACTIVE | Noted: 2017-04-09

## 2017-07-21 ENCOUNTER — LAB (OUTPATIENT)
Dept: INTERNAL MEDICINE | Facility: CLINIC | Age: 62
End: 2017-07-21

## 2017-07-21 DIAGNOSIS — R73.01 IMPAIRED FASTING GLUCOSE: ICD-10-CM

## 2017-07-21 DIAGNOSIS — E78.5 DYSLIPIDEMIA: ICD-10-CM

## 2017-07-21 DIAGNOSIS — R79.89 ELEVATED SERUM CREATININE: ICD-10-CM

## 2017-07-21 LAB
ANION GAP SERPL CALCULATED.3IONS-SCNC: 10 MMOL/L (ref 3–11)
ARTICHOKE IGE QN: 119 MG/DL (ref 0–130)
BUN BLD-MCNC: 19 MG/DL (ref 9–23)
BUN/CREAT SERPL: 21.1 (ref 7–25)
CALCIUM SPEC-SCNC: 9.4 MG/DL (ref 8.7–10.4)
CHLORIDE SERPL-SCNC: 104 MMOL/L (ref 99–109)
CHOLEST SERPL-MCNC: 180 MG/DL (ref 0–200)
CO2 SERPL-SCNC: 26 MMOL/L (ref 20–31)
CREAT BLD-MCNC: 0.9 MG/DL (ref 0.6–1.3)
GFR SERPL CREATININE-BSD FRML MDRD: 85 ML/MIN/1.73
GLUCOSE BLD-MCNC: 87 MG/DL (ref 70–100)
HBA1C MFR BLD: 5.1 % (ref 4.8–5.6)
HDLC SERPL-MCNC: 50 MG/DL (ref 40–60)
POTASSIUM BLD-SCNC: 4.3 MMOL/L (ref 3.5–5.5)
SODIUM BLD-SCNC: 140 MMOL/L (ref 132–146)
TRIGL SERPL-MCNC: 98 MG/DL (ref 0–150)

## 2017-07-21 PROCEDURE — 80048 BASIC METABOLIC PNL TOTAL CA: CPT | Performed by: INTERNAL MEDICINE

## 2017-07-21 PROCEDURE — 83036 HEMOGLOBIN GLYCOSYLATED A1C: CPT | Performed by: INTERNAL MEDICINE

## 2017-07-21 PROCEDURE — 80061 LIPID PANEL: CPT | Performed by: INTERNAL MEDICINE

## 2017-07-24 PROBLEM — S92.501A CLOSED FRACTURE OF PHALANX OF LESSER TOE OF RIGHT FOOT: Status: ACTIVE | Noted: 2017-07-24

## 2017-07-28 ENCOUNTER — OFFICE VISIT (OUTPATIENT)
Dept: INTERNAL MEDICINE | Facility: CLINIC | Age: 62
End: 2017-07-28

## 2017-07-28 VITALS
HEIGHT: 69 IN | DIASTOLIC BLOOD PRESSURE: 70 MMHG | WEIGHT: 177 LBS | BODY MASS INDEX: 26.22 KG/M2 | SYSTOLIC BLOOD PRESSURE: 122 MMHG

## 2017-07-28 DIAGNOSIS — R73.01 IMPAIRED FASTING GLUCOSE: Primary | ICD-10-CM

## 2017-07-28 DIAGNOSIS — E78.5 DYSLIPIDEMIA: ICD-10-CM

## 2017-07-28 DIAGNOSIS — N28.9 RENAL INSUFFICIENCY: ICD-10-CM

## 2017-07-28 PROCEDURE — 99214 OFFICE O/P EST MOD 30 MIN: CPT | Performed by: INTERNAL MEDICINE

## 2017-07-28 NOTE — PROGRESS NOTES
"Chief Complaint   Patient presents with   • Follow-up     Dyslipidemia, impaired fasting glucose        History of Present Illness  62 y.o.  gentleman presents for sugar and cholesterol follow-up.  Notes 6 weeks of decreased exercise when he broke toe (compression fx) of foot that was operated on previously.  Has resumed 6-mi walks and biking.     Review of Systems  ROS neg for CP, palpitations, SOB, lightheadedness.  ROS (+) for resolving toe fracture.    Reports colonosc on Monday that showed a diminutive polyp, path pending. All other ROS reviewed and negative.    [facility]  The following portions of the patient's history were reviewed and updated as appropriate: allergies, current medications, past family history, past medical history, past social history, past surgical history and problem list.      Current Outpatient Prescriptions:   •  aspirin 81 MG tablet, Take 1 tablet by mouth daily., Disp: , Rfl:   •  Cholecalciferol (VITAMIN D) 2000 UNITS capsule, Take 1 capsule by mouth daily., Disp: , Rfl:   •  Cinnamon 500 MG tablet, Take 1 tablet by mouth daily., Disp: , Rfl:   •  fexofenadine (ALLEGRA) 180 MG tablet, Take 180 mg by mouth Daily., Disp: , Rfl:   •  ibuprofen (ADVIL,MOTRIN) 600 MG tablet, Take 1 tablet by mouth 2 (two) times a day as needed., Disp: , Rfl:   •  Multiple Vitamins-Minerals (CENTRUM SILVER ADULT 50+ PO), Take 1 tablet by mouth daily., Disp: , Rfl:   •  Omega-3 Krill Oil 1000 MG capsule, Take 1 tablet by mouth daily., Disp: , Rfl:   •  Triamcinolone Acetonide (NASACORT AQ NA), into each nostril., Disp: , Rfl:     VITALS:  /70  Ht 69\" (175.3 cm)  Wt 177 lb (80.3 kg)  BMI 26.14 kg/m2    Physical Exam   Constitutional: He is oriented to person, place, and time. He appears well-developed and well-nourished.   Eyes: Conjunctivae and EOM are normal.   Glasses, baseline strabismus   Cardiovascular: Normal rate, regular rhythm and normal heart sounds.    Pulmonary/Chest: Effort normal " and breath sounds normal.   Abdominal: Soft. Bowel sounds are normal.   Neurological: He is alert and oriented to person, place, and time.   Psychiatric: He has a normal mood and affect. His behavior is normal.   Nursing note and vitals reviewed.      LABS  Results for orders placed or performed in visit on 07/21/17   Basic Metabolic Panel   Result Value Ref Range    Glucose 87 70 - 100 mg/dL    BUN 19 9 - 23 mg/dL    Creatinine 0.90 0.60 - 1.30 mg/dL    Sodium 140 132 - 146 mmol/L    Potassium 4.3 3.5 - 5.5 mmol/L    Chloride 104 99 - 109 mmol/L    CO2 26.0 20.0 - 31.0 mmol/L    Calcium 9.4 8.7 - 10.4 mg/dL    eGFR Non African Amer 85 >60 mL/min/1.73    BUN/Creatinine Ratio 21.1 7.0 - 25.0    Anion Gap 10.0 3.0 - 11.0 mmol/L   Lipid Panel   Result Value Ref Range    Total Cholesterol 180 0 - 200 mg/dL    Triglycerides 98 0 - 150 mg/dL    HDL Cholesterol 50 40 - 60 mg/dL    LDL Cholesterol  119 0 - 130 mg/dL   Hemoglobin A1c   Result Value Ref Range    Hemoglobin A1C 5.10 4.80 - 5.60 %       ASSESSMENT/PLAN  Problem List Items Addressed This Visit     Impaired fasting glucose - Primary     BG control remains stable with A1C 5.1; encouraged reg phys activity to decr insulin resistance, moderation in unhealthy starches/sweets; f/u A1C in 6 mos           Dyslipidemia     Lipids improved, now all parameters at goal; no meds; decrease saturated fats and cholesterol in the diet; repeat lipids in 6 mos           Renal insufficiency     Renal function back to normal               FOLLOW-UP  1. Health maintenance - colonosc done 7/24/17 with Dr. George, await report and polyp path  2. RTC for next PE after 1/31/18; fasting labs the week prior to appt (CBC, CMP, TSH, lipids, UA/micro, PSA)    Electronically signed by:    Gina Adamson MD  07/28/2017

## 2017-07-28 NOTE — ASSESSMENT & PLAN NOTE
Lipids improved, now all parameters at goal; no meds; decrease saturated fats and cholesterol in the diet; repeat lipids in 6 mos

## 2017-07-28 NOTE — ASSESSMENT & PLAN NOTE
BG control remains stable with A1C 5.1; encouraged reg phys activity to decr insulin resistance, moderation in unhealthy starches/sweets; f/u A1C in 6 mos

## 2017-12-15 ENCOUNTER — TELEPHONE (OUTPATIENT)
Dept: INTERNAL MEDICINE | Facility: CLINIC | Age: 62
End: 2017-12-15

## 2017-12-15 NOTE — TELEPHONE ENCOUNTER
YESSI, MR PERDUE CALLED TO REPORT TO DR THOMPSON THAT HE IS EXPERIENCING FLUID BUILD UP AROUND HIS KNEE. HE STATES HE HAD INJURED THIS KNEE YEARS AGO AND THINKS THIS MAY BE RELATED TO THAT INJURY. HE STATES THAT HE IS HAVING SOME SWELLING AND WOULD LIKE TO BE ADVISED AT THIS POINT. CALL BACK [phone]

## 2017-12-16 NOTE — TELEPHONE ENCOUNTER
Needs OV for further evaluation.    In the interim, rec ice, elevation, and ibuprofen (if taking OTC, take 3-4 tabs 2-3x/day as needed w/food)

## 2017-12-19 ENCOUNTER — OFFICE VISIT (OUTPATIENT)
Dept: INTERNAL MEDICINE | Facility: CLINIC | Age: 62
End: 2017-12-19

## 2017-12-19 VITALS — WEIGHT: 180 LBS | BODY MASS INDEX: 26.58 KG/M2 | DIASTOLIC BLOOD PRESSURE: 72 MMHG | SYSTOLIC BLOOD PRESSURE: 116 MMHG

## 2017-12-19 DIAGNOSIS — M25.562 ACUTE PAIN OF LEFT KNEE: Primary | ICD-10-CM

## 2017-12-19 PROCEDURE — 90686 IIV4 VACC NO PRSV 0.5 ML IM: CPT | Performed by: INTERNAL MEDICINE

## 2017-12-19 PROCEDURE — 90471 IMMUNIZATION ADMIN: CPT | Performed by: INTERNAL MEDICINE

## 2017-12-19 PROCEDURE — 99213 OFFICE O/P EST LOW 20 MIN: CPT | Performed by: INTERNAL MEDICINE

## 2017-12-19 RX ORDER — VIT C/B6/B5/MAGNESIUM/HERB 173 50-5-6-5MG
500 CAPSULE ORAL DAILY
COMMUNITY

## 2017-12-19 NOTE — PROGRESS NOTES
"Chief Complaint   Patient presents with   • Left knee pain and swelling       History of Present Illness  62 y.o.  gentleman presents for further eval of left knee pain and swelling.  HPI started 1 wk ago Wednesday with awakening to left knee stiffness.  No injuries or falls.  Notes assoc'd swelling with tightness, with tight sensation at the back of the knee.  Swelling has persisted but \"background achiness\" waxes/wanes.  Has tried to elevate leg and has laid off of walking/biking exercise. Has not taken meds for pain/swelling.      Review of Systems  ROS (+) for left knee pain/swelling as above. All other ROS reviewed and negative.    [facility]  The following portions of the patient's history were reviewed and updated as appropriate: allergies, current medications, past family history, past medical history, past social history, past surgical history and problem list.      Current Outpatient Prescriptions:   •  Turmeric 500 MG capsule, QD  •  aspirin 81 MG tablet, QD  •  Cholecalciferol (VITAMIN D) 2000 UNITS QD  •  Cinnamon 500 MG tablet, QD  •  fexofenadine (ALLEGRA) 180 MG tablet, QD  •  ibuprofen (ADVIL,MOTRIN) 600 MG tablet, BID prn  •  Multiple Vitamins-Minerals (CENTRUM SILVER ADULT 50+ PO), QD  •  Omega-3 Krill Oil 1000 MG capsule, QD  •  Triamcinolone Acetonide (NASACORT AQ NA), 2sprays/nostril QD    VITALS:  /72  Wt 81.6 kg (180 lb)  BMI 26.58 kg/m2    Physical Exam   Constitutional: He is oriented to person, place, and time. He appears well-developed and well-nourished.   Eyes: Conjunctivae and EOM are normal.   Wears glasses   Cardiovascular: Normal rate, regular rhythm and normal heart sounds.    Pulmonary/Chest: Effort normal and breath sounds normal.   Musculoskeletal: He exhibits tenderness (mild discomfort and fullness with palpation of the left popliteal fossa; no warmth, no crepitus). He exhibits no edema (BLE) or deformity.   bilat knees FROM; neg Chandra's test; minimal swelling " lat to patella; bony landmarks present; normal gait   Neurological: He is alert and oriented to person, place, and time.   Skin:   Excoriation mid left ant shin   Psychiatric: He has a normal mood and affect. His behavior is normal.   Nursing note and vitals reviewed.      LABS  Results for orders placed or performed in visit on 07/21/17   Basic Metabolic Panel   Result Value Ref Range    Glucose 87 70 - 100 mg/dL    BUN 19 9 - 23 mg/dL    Creatinine 0.90 0.60 - 1.30 mg/dL    Sodium 140 132 - 146 mmol/L    Potassium 4.3 3.5 - 5.5 mmol/L    Chloride 104 99 - 109 mmol/L    CO2 26.0 20.0 - 31.0 mmol/L    Calcium 9.4 8.7 - 10.4 mg/dL    eGFR Non African Amer 85 >60 mL/min/1.73    BUN/Creatinine Ratio 21.1 7.0 - 25.0    Anion Gap 10.0 3.0 - 11.0 mmol/L   Lipid Panel   Result Value Ref Range    Total Cholesterol 180 0 - 200 mg/dL    Triglycerides 98 0 - 150 mg/dL    HDL Cholesterol 50 40 - 60 mg/dL    LDL Cholesterol  119 0 - 130 mg/dL   Hemoglobin A1c   Result Value Ref Range    Hemoglobin A1C 5.10 4.80 - 5.60 %       ASSESSMENT/PLAN  Problem List Items Addressed This Visit     None      Visit Diagnoses     Acute pain of left knee    -  Primary    discomfort/fullness behind knee; discussed inflamm process; Baker's cyst on ddx; rec ibuprofen 3-4 tid prn w/ food and wean off w/improvement; u/s if no better          FOLLOW-UP  1. Health maintenance - flu vacc given today  2. RTC for next PE 2/5/18 as scheduled; fasting labs the week prior to appt (CBC, CMP, TSH, lipids, UA/micro, PSA)    Electronically signed by:    Gina Adamson MD  12/19/2017

## 2017-12-26 ENCOUNTER — PATIENT MESSAGE (OUTPATIENT)
Dept: INTERNAL MEDICINE | Facility: CLINIC | Age: 62
End: 2017-12-26

## 2017-12-26 DIAGNOSIS — M25.562 ACUTE PAIN OF LEFT KNEE: Primary | ICD-10-CM

## 2017-12-27 NOTE — TELEPHONE ENCOUNTER
From: Yoseph Granados Jr.  To: Gina Adamson MD  Sent: 12/26/2017 9:30 AM EST  Subject: Visit Follow-Up Question    Dr. Adamson,  I wanted to follow-up with you on my knee symptoms identified on my appointment of 12/19/17. While it hasn't worsened, it hasn't dramatically improved. The tightness and pain behind the knee comes and goes, but seems to come on more later in the day. For awhile I was having a higher level of pain in the front of the knee, but that has subsided. The knee appears to continue to have some fluid build up in it. Overall my mobility isn't affected, although the knee gets a little stiff during flare-ups of my symptoms.    I am now taking the ibuprofen twice a day at a dosage of 600-800mg as directed during my last appointment.    Hope you are having a wonderful holidays! Please let me know at your convenience what our next treatment step should be. You mentioned an ultrasound scan may need to be done to look for a possible Baker's cyst.    Thanks,    Yoseph Granados

## 2017-12-28 ENCOUNTER — HOSPITAL ENCOUNTER (OUTPATIENT)
Dept: CARDIOLOGY | Facility: HOSPITAL | Age: 62
Discharge: HOME | End: 2017-12-28
Attending: INTERNAL MEDICINE | Admitting: INTERNAL MEDICINE

## 2017-12-28 ENCOUNTER — TELEPHONE (OUTPATIENT)
Dept: INTERNAL MEDICINE | Facility: CLINIC | Age: 62
End: 2017-12-28

## 2017-12-28 VITALS — BODY MASS INDEX: 26.66 KG/M2 | HEIGHT: 69 IN | WEIGHT: 180 LBS

## 2017-12-28 DIAGNOSIS — M25.562 ACUTE PAIN OF LEFT KNEE: ICD-10-CM

## 2017-12-28 PROBLEM — I80.02 SUPERFICIAL THROMBOPHLEBITIS OF LEFT LEG: Status: ACTIVE | Noted: 2017-12-28

## 2017-12-28 LAB
BH CV LOW VAS LEFT GREATER SAPH BK VESSEL: 1
BH CV LOW VAS LEFT LESSER SAPH VESSEL: 1
BH CV LOWER VASCULAR LEFT COMMON FEMORAL AUGMENT: NORMAL
BH CV LOWER VASCULAR LEFT COMMON FEMORAL COMPRESS: NORMAL
BH CV LOWER VASCULAR LEFT COMMON FEMORAL PHASIC: NORMAL
BH CV LOWER VASCULAR LEFT COMMON FEMORAL SPONT: NORMAL
BH CV LOWER VASCULAR LEFT DISTAL FEMORAL AUGMENT: NORMAL
BH CV LOWER VASCULAR LEFT DISTAL FEMORAL COMPRESS: NORMAL
BH CV LOWER VASCULAR LEFT DISTAL FEMORAL PHASIC: NORMAL
BH CV LOWER VASCULAR LEFT DISTAL FEMORAL SPONT: NORMAL
BH CV LOWER VASCULAR LEFT GASTRONEMIUS COMPRESS: NORMAL
BH CV LOWER VASCULAR LEFT GREATER SAPH AK COMPRESS: NORMAL
BH CV LOWER VASCULAR LEFT GREATER SAPH BK COMPRESS: NORMAL
BH CV LOWER VASCULAR LEFT LESSER SAPH COMPRESS: NORMAL
BH CV LOWER VASCULAR LEFT MID FEMORAL AUGMENT: NORMAL
BH CV LOWER VASCULAR LEFT MID FEMORAL COMPRESS: NORMAL
BH CV LOWER VASCULAR LEFT MID FEMORAL PHASIC: NORMAL
BH CV LOWER VASCULAR LEFT MID FEMORAL SPONT: NORMAL
BH CV LOWER VASCULAR LEFT PERONEAL COMPRESS: NORMAL
BH CV LOWER VASCULAR LEFT POPLITEAL AUGMENT: NORMAL
BH CV LOWER VASCULAR LEFT POPLITEAL COMPRESS: NORMAL
BH CV LOWER VASCULAR LEFT POPLITEAL PHASIC: NORMAL
BH CV LOWER VASCULAR LEFT POPLITEAL SPONT: NORMAL
BH CV LOWER VASCULAR LEFT POSTERIOR TIBIAL COMPRESS: NORMAL
BH CV LOWER VASCULAR LEFT PROFUNDA FEMORAL AUGMENT: NORMAL
BH CV LOWER VASCULAR LEFT PROFUNDA FEMORAL COMPRESS: NORMAL
BH CV LOWER VASCULAR LEFT PROFUNDA FEMORAL PHASIC: NORMAL
BH CV LOWER VASCULAR LEFT PROFUNDA FEMORAL SPONT: NORMAL
BH CV LOWER VASCULAR LEFT PROXIMAL FEMORAL AUGMENT: NORMAL
BH CV LOWER VASCULAR LEFT PROXIMAL FEMORAL COMPRESS: NORMAL
BH CV LOWER VASCULAR LEFT PROXIMAL FEMORAL PHASIC: NORMAL
BH CV LOWER VASCULAR LEFT PROXIMAL FEMORAL SPONT: NORMAL
BH CV LOWER VASCULAR LEFT SAPHENOFEMORAL JUNCTION COMPRESS: NORMAL
BH CV LOWER VASCULAR LEFT SAPHENOFEMORAL JUNCTION SPONT: NORMAL
BH CV LOWER VASCULAR RIGHT COMMON FEMORAL AUGMENT: NORMAL
BH CV LOWER VASCULAR RIGHT COMMON FEMORAL COMPRESS: NORMAL
BH CV LOWER VASCULAR RIGHT COMMON FEMORAL PHASIC: NORMAL
BH CV LOWER VASCULAR RIGHT COMMON FEMORAL SPONT: NORMAL

## 2017-12-28 PROCEDURE — 93971 EXTREMITY STUDY: CPT

## 2017-12-28 PROCEDURE — 93971 EXTREMITY STUDY: CPT | Performed by: INTERNAL MEDICINE

## 2017-12-28 NOTE — TELEPHONE ENCOUNTER
Received call from Vascular lab patient positive for superficial clot in left lower leg. Lab stated that it is in the superficial vein below the knee. Spoke with Dr Gonzalez informed patient to use warm compacts and to take  Aspirin 325 mg  daily. Patient scheduled for 1/14/17 @ 8. Please advise if OK to overbook for sooner apt. Patient voiced undrstanding

## 2017-12-29 NOTE — TELEPHONE ENCOUNTER
Schedule says current appt 1/16/18 at 8am.    Please advise regarding morning appointments for 1/4/18 (Thurs).  If there is an 8:15am patient, ok to schedule Mr. Granados at 8am (and just let me know).    If the other patient is indeed coming at 1215, then please put Mr. Granados at 8:30am (and let me know)

## 2017-12-29 NOTE — TELEPHONE ENCOUNTER
So sorry for the confusion patient has been RS for 1/4/18 at 8:30 Patients wife notified voiced understanding

## 2018-01-04 ENCOUNTER — OFFICE VISIT (OUTPATIENT)
Dept: INTERNAL MEDICINE | Facility: CLINIC | Age: 63
End: 2018-01-04

## 2018-01-04 VITALS
SYSTOLIC BLOOD PRESSURE: 130 MMHG | BODY MASS INDEX: 26.88 KG/M2 | OXYGEN SATURATION: 99 % | HEART RATE: 72 BPM | WEIGHT: 182 LBS | DIASTOLIC BLOOD PRESSURE: 76 MMHG

## 2018-01-04 DIAGNOSIS — Z00.00 ROUTINE HEALTH MAINTENANCE: ICD-10-CM

## 2018-01-04 DIAGNOSIS — I80.02 SUPERFICIAL THROMBOPHLEBITIS OF LEFT LEG: Primary | ICD-10-CM

## 2018-01-04 DIAGNOSIS — R73.01 IMPAIRED FASTING GLUCOSE: ICD-10-CM

## 2018-01-04 PROCEDURE — 99213 OFFICE O/P EST LOW 20 MIN: CPT | Performed by: INTERNAL MEDICINE

## 2018-01-04 NOTE — ASSESSMENT & PLAN NOTE
Reviewed results of venous duplex; reassurance not DVT; rec leg elevation, cont'd ASA qd; prn warm compresses; f/u as needed

## 2018-01-04 NOTE — PROGRESS NOTES
Chief Complaint   Patient presents with   • Follow-up     left knee pain       History of Present Illness  62 y.o.  gentleman presents for follow-up after dx of superficial thrombophlebitis by ultrasound for eval of left leg pain/swelling.  Reports decreased stiffness behind the knee and decreased pains around the knee.  Reports occ shooting pains at inner upper shin area that come and go.  Took down xmas decorations and had some increased pain.  Remains on 2 baby aspirin at baseline and has been using warm compresses.  Has not noticed any leg redness nor leg swelling.  R. Side is asx.    Review of Systems  ROS (+) for decreased left knee pain and stiffness.  Denies LE swelling, rashes, redness, heat. Denies CP, palpitations, SOB.  All other ROS reviewed and negative.    [facility]  The following portions of the patient's history were reviewed and updated as appropriate: allergies, current medications, past family history, past medical history, past social history, past surgical history and problem list.      Current Outpatient Prescriptions:   •  aspirin 81 MG tablet, Take 2 tablets by mouth Daily., Disp: , Rfl:   •  Cholecalciferol (VITAMIN D) 2000 UNITS capsule, Take 1 capsule by mouth daily., Disp: , Rfl:   •  Cinnamon 500 MG tablet, Take 1 tablet by mouth daily., Disp: , Rfl:   •  fexofenadine (ALLEGRA) 180 MG tablet, Take 180 mg by mouth Daily., Disp: , Rfl:   •  ibuprofen (ADVIL,MOTRIN) 600 MG tablet, Take 1 tablet by mouth 2 (two) times a day as needed., Disp: , Rfl:   •  Multiple Vitamins-Minerals (CENTRUM SILVER ADULT 50+ PO), Take 1 tablet by mouth daily., Disp: , Rfl:   •  Omega-3 Krill Oil 1000 MG capsule, Take 1 tablet by mouth daily., Disp: , Rfl:   •  Triamcinolone Acetonide (NASACORT AQ NA), into each nostril., Disp: , Rfl:   •  Turmeric 500 MG capsule, Take 500 mg by mouth Daily., Disp: , Rfl:     VITALS:  /76  Pulse 72  Wt 82.6 kg (182 lb)  SpO2 99%  BMI 26.88 kg/m2    Physical Exam    Constitutional: He is oriented to person, place, and time. He appears well-developed and well-nourished.   Eyes: Conjunctivae and EOM are normal.   Baseline dysconjugate gaze, wears glasses   Cardiovascular: Normal rate, regular rhythm and normal heart sounds.    2+ PT pulses bilaterally   Pulmonary/Chest: Effort normal and breath sounds normal.   Abdominal: Soft. Bowel sounds are normal.   Musculoskeletal: Normal range of motion. He exhibits no edema (BLE), tenderness or deformity (left knee FROM without swelling, erythema, warmth, tend with ROM).   Neurological: He is alert and oriented to person, place, and time.   Skin: Skin is warm and dry. No rash noted.   Psychiatric: He has a normal mood and affect. His behavior is normal.   Nursing note and vitals reviewed.      LABS  12/28/17 LLE venous duplex - acute superficial thrombophlebitis greater saph (below knee) and small saphenous    ASSESSMENT/PLAN  Problem List Items Addressed This Visit     Superficial thrombophlebitis of left leg - Primary     Reviewed results of venous duplex; reassurance not DVT; rec leg elevation, cont'd ASA qd; prn warm compresses; f/u as needed               FOLLOW-UP  1. Health maintenance - flu vacc 12/17  2. RTC for next PE 2/5/18 as scheduled; fasting labs the week prior to appt (CBC, CMP, TSH, lipids, UA/micro, PSA) - escribed    Electronically signed by:    Gina Adamson MD  01/04/2018

## 2018-01-30 ENCOUNTER — LAB (OUTPATIENT)
Dept: INTERNAL MEDICINE | Facility: CLINIC | Age: 63
End: 2018-01-30

## 2018-01-30 DIAGNOSIS — R73.01 IMPAIRED FASTING GLUCOSE: ICD-10-CM

## 2018-01-30 DIAGNOSIS — Z00.00 ROUTINE HEALTH MAINTENANCE: ICD-10-CM

## 2018-01-30 LAB
ALBUMIN SERPL-MCNC: 4.3 G/DL (ref 3.2–4.8)
ALBUMIN/GLOB SERPL: 2.5 G/DL (ref 1.5–2.5)
ALP SERPL-CCNC: 60 U/L (ref 25–100)
ALT SERPL W P-5'-P-CCNC: 21 U/L (ref 7–40)
AMORPH URATE CRY URNS QL MICRO: ABNORMAL /HPF
ANION GAP SERPL CALCULATED.3IONS-SCNC: 8 MMOL/L (ref 3–11)
ARTICHOKE IGE QN: 120 MG/DL (ref 0–130)
AST SERPL-CCNC: 18 U/L (ref 0–33)
BACTERIA UR QL AUTO: ABNORMAL /HPF
BASOPHILS # BLD AUTO: 0.03 10*3/MM3 (ref 0–0.2)
BASOPHILS NFR BLD AUTO: 0.4 % (ref 0–1)
BILIRUB SERPL-MCNC: 0.6 MG/DL (ref 0.3–1.2)
BILIRUB UR QL STRIP: NEGATIVE
BUN BLD-MCNC: 18 MG/DL (ref 9–23)
BUN/CREAT SERPL: 16.4 (ref 7–25)
CALCIUM SPEC-SCNC: 8.9 MG/DL (ref 8.7–10.4)
CHLORIDE SERPL-SCNC: 104 MMOL/L (ref 99–109)
CHOLEST SERPL-MCNC: 179 MG/DL (ref 0–200)
CLARITY UR: ABNORMAL
CO2 SERPL-SCNC: 28 MMOL/L (ref 20–31)
COLOR UR: ABNORMAL
CREAT BLD-MCNC: 1.1 MG/DL (ref 0.6–1.3)
DEPRECATED RDW RBC AUTO: 41.6 FL (ref 37–54)
EOSINOPHIL # BLD AUTO: 0.14 10*3/MM3 (ref 0–0.3)
EOSINOPHIL NFR BLD AUTO: 2 % (ref 0–3)
ERYTHROCYTE [DISTWIDTH] IN BLOOD BY AUTOMATED COUNT: 12.4 % (ref 11.3–14.5)
GFR SERPL CREATININE-BSD FRML MDRD: 67 ML/MIN/1.73
GLOBULIN UR ELPH-MCNC: 1.7 GM/DL
GLUCOSE BLD-MCNC: 87 MG/DL (ref 70–100)
GLUCOSE UR STRIP-MCNC: NEGATIVE MG/DL
HBA1C MFR BLD: 5.5 % (ref 4.8–5.6)
HCT VFR BLD AUTO: 48.9 % (ref 38.9–50.9)
HDLC SERPL-MCNC: 44 MG/DL (ref 40–60)
HGB BLD-MCNC: 16.6 G/DL (ref 13.1–17.5)
HGB UR QL STRIP.AUTO: NEGATIVE
IMM GRANULOCYTES # BLD: 0.01 10*3/MM3 (ref 0–0.03)
IMM GRANULOCYTES NFR BLD: 0.1 % (ref 0–0.6)
KETONES UR QL STRIP: NEGATIVE
LEUKOCYTE ESTERASE UR QL STRIP.AUTO: NEGATIVE
LYMPHOCYTES # BLD AUTO: 1.42 10*3/MM3 (ref 0.6–4.8)
LYMPHOCYTES NFR BLD AUTO: 20.5 % (ref 24–44)
MCH RBC QN AUTO: 31.4 PG (ref 27–31)
MCHC RBC AUTO-ENTMCNC: 33.9 G/DL (ref 32–36)
MCV RBC AUTO: 92.4 FL (ref 80–99)
MONOCYTES # BLD AUTO: 0.58 10*3/MM3 (ref 0–1)
MONOCYTES NFR BLD AUTO: 8.4 % (ref 0–12)
NEUTROPHILS # BLD AUTO: 4.76 10*3/MM3 (ref 1.5–8.3)
NEUTROPHILS NFR BLD AUTO: 68.6 % (ref 41–71)
NITRITE UR QL STRIP: NEGATIVE
PH UR STRIP.AUTO: 6 [PH] (ref 5–8)
PLATELET # BLD AUTO: 180 10*3/MM3 (ref 150–450)
PMV BLD AUTO: 11.4 FL (ref 6–12)
POTASSIUM BLD-SCNC: 4.3 MMOL/L (ref 3.5–5.5)
PROT SERPL-MCNC: 6 G/DL (ref 5.7–8.2)
PROT UR QL STRIP: NEGATIVE
PSA SERPL-MCNC: 0.45 NG/ML (ref 0–4)
RBC # BLD AUTO: 5.29 10*6/MM3 (ref 4.2–5.76)
RBC # UR: ABNORMAL /HPF
REF LAB TEST METHOD: ABNORMAL
SODIUM BLD-SCNC: 140 MMOL/L (ref 132–146)
SP GR UR STRIP: 1.03 (ref 1–1.03)
SQUAMOUS #/AREA URNS HPF: ABNORMAL /HPF
TRIGL SERPL-MCNC: 168 MG/DL (ref 0–150)
TSH SERPL DL<=0.05 MIU/L-ACNC: 2.74 MIU/ML (ref 0.35–5.35)
UROBILINOGEN UR QL STRIP: ABNORMAL
WBC NRBC COR # BLD: 6.94 10*3/MM3 (ref 3.5–10.8)
WBC UR QL AUTO: ABNORMAL /HPF

## 2018-01-30 PROCEDURE — 85025 COMPLETE CBC W/AUTO DIFF WBC: CPT | Performed by: INTERNAL MEDICINE

## 2018-01-30 PROCEDURE — 80053 COMPREHEN METABOLIC PANEL: CPT | Performed by: INTERNAL MEDICINE

## 2018-01-30 PROCEDURE — 81001 URINALYSIS AUTO W/SCOPE: CPT | Performed by: INTERNAL MEDICINE

## 2018-01-30 PROCEDURE — 84443 ASSAY THYROID STIM HORMONE: CPT | Performed by: INTERNAL MEDICINE

## 2018-01-30 PROCEDURE — G0103 PSA SCREENING: HCPCS | Performed by: INTERNAL MEDICINE

## 2018-01-30 PROCEDURE — 80061 LIPID PANEL: CPT | Performed by: INTERNAL MEDICINE

## 2018-01-30 PROCEDURE — 83036 HEMOGLOBIN GLYCOSYLATED A1C: CPT | Performed by: INTERNAL MEDICINE

## 2018-02-05 ENCOUNTER — OFFICE VISIT (OUTPATIENT)
Dept: INTERNAL MEDICINE | Facility: CLINIC | Age: 63
End: 2018-02-05

## 2018-02-05 VITALS
HEIGHT: 68 IN | DIASTOLIC BLOOD PRESSURE: 70 MMHG | RESPIRATION RATE: 16 BRPM | HEART RATE: 64 BPM | SYSTOLIC BLOOD PRESSURE: 142 MMHG | BODY MASS INDEX: 27.49 KG/M2 | WEIGHT: 181.38 LBS

## 2018-02-05 DIAGNOSIS — R03.0 ELEVATED BLOOD PRESSURE READING WITHOUT DIAGNOSIS OF HYPERTENSION: ICD-10-CM

## 2018-02-05 DIAGNOSIS — D17.1 LIPOMA OF TORSO: ICD-10-CM

## 2018-02-05 DIAGNOSIS — E78.5 DYSLIPIDEMIA: ICD-10-CM

## 2018-02-05 DIAGNOSIS — R73.01 IMPAIRED FASTING GLUCOSE: ICD-10-CM

## 2018-02-05 DIAGNOSIS — I80.02 SUPERFICIAL THROMBOPHLEBITIS OF LEFT LEG: ICD-10-CM

## 2018-02-05 DIAGNOSIS — Z00.00 PE (PHYSICAL EXAM), ROUTINE: Primary | ICD-10-CM

## 2018-02-05 PROCEDURE — 99396 PREV VISIT EST AGE 40-64: CPT | Performed by: INTERNAL MEDICINE

## 2018-02-05 PROCEDURE — 93000 ELECTROCARDIOGRAM COMPLETE: CPT | Performed by: INTERNAL MEDICINE

## 2018-02-05 NOTE — ASSESSMENT & PLAN NOTE
Health maintenance - flu vacc 12/17; Tdap 9/13, Zostavax 12/16; plan for pneumonia vaccines next yr; colonosc 7/17, repeat 2022 with Dr. George; PSA stable; eye exam pending 4/18 with Dr. Aragon; dental exam UTD every 6 mos; (+) seat belt use

## 2018-02-05 NOTE — ASSESSMENT & PLAN NOTE
Good BG control with A1C 5.5; encouraged reg phys activity to decr insulin resistance, moderation in unhealthy starches/sweets; f/u A1C in 6 mos

## 2018-02-05 NOTE — ASSESSMENT & PLAN NOTE
Multiple/diffuse on abdomen; scattered on arms per patient; benign and asx currently; derm - Dr. Amador

## 2018-02-05 NOTE — PROGRESS NOTES
"Chief Complaint   Patient presents with   • Annual Exam       History of Present Illness  62 y.o.  gentleman presents for updated phys examination.    Review of Systems  Denies headaches, visual changes, CP, palpitations, SOB, cough, abd pain, n/v/d, difficulty with urination, numbness/tingling, falls, mood changes, lightheadedness, hearing changes, rashes.    States decreased exercise; stationary bike has worn out and will be getting this fixed.    Notes only using nasacort prn; gets at [employer].    Notes some residual left knee ineer pain, mostly in the mornings and then resolves with activity.     All other ROS reviewed and negative.    [facility]  The following portions of the patient's history were reviewed and updated as appropriate: allergies, current medications, past family history, past medical history, past social history, past surgical history and problem list.  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    Current Outpatient Prescriptions:   •  aspirin 81 MG tablet, 2 QD  •  Cholecalciferol (VITAMIN D) 2000 UNITS QD  •  Cinnamon 500 MG tablet, QD   •  fexofenadine (ALLEGRA) 180 MG tablet, DQ  •  ibuprofen (ADVIL,MOTRIN) 600 MG tablet, BID prn  •  Multiple Vitamins-Minerals (CENTRUM SILVER ADULT 50+ PO), QD  •  Omega-3 Krill Oil 1000 MG capsule, QD  •  Triamcinolone Acetonide (NASACORT AQ NA), 2 sprays/nostril QD  •  Turmeric 500 MG capsule, QD    VITALS:  /70 (BP Location: Left arm, Patient Position: Sitting)  Pulse 64  Resp 16  Ht 172.7 cm (68\")  Wt 82.3 kg (181 lb 6 oz)  BMI 27.58 kg/m2    Physical Exam   Constitutional: He is oriented to person, place, and time. He appears well-developed and well-nourished.   HENT:   Head: Normocephalic.   Right Ear: External ear normal.   Left Ear: External ear normal.   Nose: Nose normal.   Mouth/Throat: Oropharynx is clear and moist and mucous membranes are normal. No oropharyngeal exudate. "   Eyes: Conjunctivae and EOM are normal. Pupils are equal, round, and reactive to light.   Stable dysconjugate gaze; wears glasses   Neck: Normal range of motion. Neck supple. Carotid bruit is not present (bilaterally). No thyromegaly present.   Cardiovascular: Normal rate, regular rhythm and normal heart sounds.    Pulmonary/Chest: Effort normal and breath sounds normal. No respiratory distress. He has no wheezes. He has no rales.   Abdominal: Soft. Bowel sounds are normal. He exhibits no distension and no mass. There is no hepatosplenomegaly. There is no tenderness.   Genitourinary:   Genitourinary Comments: Gu/FARNAZ deferred   Musculoskeletal: Normal range of motion. He exhibits no edema.   Lymphadenopathy:     He has no cervical adenopathy.   Neurological: He is alert and oriented to person, place, and time. He has normal reflexes. He displays normal reflexes. No cranial nerve deficit.   Skin: Skin is warm and dry. No rash noted.   Diffuse dime-dinesh sized SQ nodules at the upper abdomen, nontender, no skin changes   Psychiatric: He has a normal mood and affect. His behavior is normal.   Nursing note and vitals reviewed.        LABS  Results for orders placed or performed in visit on 01/30/18   Comprehensive Metabolic Panel   Result Value Ref Range    Glucose 87 70 - 100 mg/dL    BUN 18 9 - 23 mg/dL    Creatinine 1.10 0.60 - 1.30 mg/dL    Sodium 140 132 - 146 mmol/L    Potassium 4.3 3.5 - 5.5 mmol/L    Chloride 104 99 - 109 mmol/L    CO2 28.0 20.0 - 31.0 mmol/L    Calcium 8.9 8.7 - 10.4 mg/dL    Total Protein 6.0 5.7 - 8.2 g/dL    Albumin 4.30 3.20 - 4.80 g/dL    ALT (SGPT) 21 7 - 40 U/L    AST (SGOT) 18 0 - 33 U/L    Alkaline Phosphatase 60 25 - 100 U/L    Total Bilirubin 0.6 0.3 - 1.2 mg/dL    eGFR Non African Amer 67 >60 mL/min/1.73    Globulin 1.7 gm/dL    A/G Ratio 2.5 1.5 - 2.5 g/dL    BUN/Creatinine Ratio 16.4 7.0 - 25.0    Anion Gap 8.0 3.0 - 11.0 mmol/L   Lipid Panel   Result Value Ref Range    Total  Cholesterol 179 0 - 200 mg/dL    Triglycerides 168 (H) 0 - 150 mg/dL    HDL Cholesterol 44 40 - 60 mg/dL    LDL Cholesterol  120 0 - 130 mg/dL   TSH   Result Value Ref Range    TSH 2.744 0.350 - 5.350 mIU/mL   PSA Screen   Result Value Ref Range    PSA 0.450 0.000 - 4.000 ng/mL   Hemoglobin A1c   Result Value Ref Range    Hemoglobin A1C 5.50 4.80 - 5.60 %   CBC Auto Differential   Result Value Ref Range    WBC 6.94 3.50 - 10.80 10*3/mm3    RBC 5.29 4.20 - 5.76 10*6/mm3    Hemoglobin 16.6 13.1 - 17.5 g/dL    Hematocrit 48.9 38.9 - 50.9 %    MCV 92.4 80.0 - 99.0 fL    MCH 31.4 (H) 27.0 - 31.0 pg    MCHC 33.9 32.0 - 36.0 g/dL    RDW 12.4 11.3 - 14.5 %    RDW-SD 41.6 37.0 - 54.0 fl    MPV 11.4 6.0 - 12.0 fL    Platelets 180 150 - 450 10*3/mm3    Neutrophil % 68.6 41.0 - 71.0 %    Lymphocyte % 20.5 (L) 24.0 - 44.0 %    Monocyte % 8.4 0.0 - 12.0 %    Eosinophil % 2.0 0.0 - 3.0 %    Basophil % 0.4 0.0 - 1.0 %    Immature Grans % 0.1 0.0 - 0.6 %    Neutrophils, Absolute 4.76 1.50 - 8.30 10*3/mm3    Lymphocytes, Absolute 1.42 0.60 - 4.80 10*3/mm3    Monocytes, Absolute 0.58 0.00 - 1.00 10*3/mm3    Eosinophils, Absolute 0.14 0.00 - 0.30 10*3/mm3    Basophils, Absolute 0.03 0.00 - 0.20 10*3/mm3    Immature Grans, Absolute 0.01 0.00 - 0.03 10*3/mm3   Urinalysis - Urine, Clean Catch   Result Value Ref Range    Color, UA Dark Yellow (A) Yellow, Straw    Appearance, UA Turbid (A) Clear    pH, UA 6.0 5.0 - 8.0    Specific Gravity, UA 1.026 1.001 - 1.030    Glucose, UA Negative Negative    Ketones, UA Negative Negative    Bilirubin, UA Negative Negative    Blood, UA Negative Negative    Protein, UA Negative Negative    Leuk Esterase, UA Negative Negative    Nitrite, UA Negative Negative    Urobilinogen, UA 0.2 E.U./dL 0.2 - 1.0 E.U./dL   Urinalysis, Microscopic Only - Urine, Clean Catch   Result Value Ref Range    RBC, UA 3-6 (A) None Seen, 0-2 /HPF    WBC, UA 3-5 (A) None Seen /HPF    Bacteria, UA 3+ (A) None Seen, Trace /HPF     Squamous Epithelial Cells, UA 0-2 None Seen, 0-2 /HPF    Amorphous Crystals, UA Moderate/2+ None Seen /HPF    Methodology Automated Microscopy        ECG 12 Lead  Date/Time: 2/5/2018 10:00 AM  Performed by: CHERISE ADAMSON  Authorized by: CHERISE ADAMSON   Comparison: compared with previous ECG from 3/24/2017  Similar to previous ECG  Rhythm: sinus rhythm  Rate: normal  BPM: 71  Conduction: incomplete RBBB  ST Segments: ST segments normal  QRS axis: normal  Other findings comments: nonspecific ST-T changes  Clinical impression comment: stable EKG            ASSESSMENT/PLAN  Problem List Items Addressed This Visit     Impaired fasting glucose     Good BG control with A1C 5.5; encouraged reg phys activity to decr insulin resistance, moderation in unhealthy starches/sweets; f/u A1C in 6 mos           Dyslipidemia     Stable LDL at 120 with bord TGs 168; decrease saturated fats and cholesterol in the diet; currently no meds         Relevant Orders    ECG 12 Lead (Completed)    Lipoma     Multiple/diffuse on abdomen; scattered on arms per patient; benign and asx currently; derm - Dr. Marjorie WILKERSON (physical exam), routine - Primary     Health maintenance - flu vacc 12/17; Tdap 9/13, Zostavax 12/16; plan for pneumonia vaccines next yr; colonosc 7/17, repeat 2022 with Dr. George; PSA stable; eye exam pending 4/18 with Dr. Aragon; dental exam UTD every 6 mos; (+) seat belt use         Superficial thrombophlebitis of left leg     Resolved; notes occ left medial knee pain in the morning, attribute this to OA           Other Visit Diagnoses     Elevated blood pressure reading without diagnosis of hypertension        no h/o HTN; monitor blood pressure at home or local drugstores with goal < 130/80; resume biking exercise; low Na diet; f/u in 6 mos at latest            FOLLOW-UP  RTC 6 mos with A1C and for BP check    Electronically signed by:    Cherise Adamson MD  02/05/2018

## 2018-02-05 NOTE — ASSESSMENT & PLAN NOTE
Stable LDL at 120 with bord TGs 168; decrease saturated fats and cholesterol in the diet; currently no meds

## 2018-02-27 ENCOUNTER — HOSPITAL ENCOUNTER (OUTPATIENT)
Dept: GENERAL RADIOLOGY | Facility: HOSPITAL | Age: 63
Discharge: HOME | End: 2018-02-27
Attending: INTERNAL MEDICINE | Admitting: INTERNAL MEDICINE

## 2018-02-27 ENCOUNTER — OFFICE VISIT (OUTPATIENT)
Dept: INTERNAL MEDICINE | Facility: CLINIC | Age: 63
End: 2018-02-27

## 2018-02-27 VITALS
DIASTOLIC BLOOD PRESSURE: 80 MMHG | HEART RATE: 78 BPM | SYSTOLIC BLOOD PRESSURE: 142 MMHG | RESPIRATION RATE: 18 BRPM | WEIGHT: 179.13 LBS | BODY MASS INDEX: 27.24 KG/M2 | OXYGEN SATURATION: 96 %

## 2018-02-27 DIAGNOSIS — J45.41 MODERATE PERSISTENT ASTHMATIC BRONCHITIS WITH ACUTE EXACERBATION: Primary | ICD-10-CM

## 2018-02-27 DIAGNOSIS — J45.41 MODERATE PERSISTENT ASTHMATIC BRONCHITIS WITH ACUTE EXACERBATION: ICD-10-CM

## 2018-02-27 DIAGNOSIS — J30.2 CHRONIC SEASONAL ALLERGIC RHINITIS, UNSPECIFIED TRIGGER: ICD-10-CM

## 2018-02-27 PROBLEM — J45.901 ASTHMATIC BRONCHITIS WITH ACUTE EXACERBATION: Status: ACTIVE | Noted: 2018-02-27

## 2018-02-27 PROCEDURE — 71046 X-RAY EXAM CHEST 2 VIEWS: CPT

## 2018-02-27 PROCEDURE — 99214 OFFICE O/P EST MOD 30 MIN: CPT | Performed by: INTERNAL MEDICINE

## 2018-02-27 PROCEDURE — 94060 EVALUATION OF WHEEZING: CPT | Performed by: INTERNAL MEDICINE

## 2018-02-27 RX ORDER — BUDESONIDE AND FORMOTEROL FUMARATE DIHYDRATE 80; 4.5 UG/1; UG/1
AEROSOL RESPIRATORY (INHALATION)
Qty: 1 INHALER | Refills: 1 | Status: SHIPPED | OUTPATIENT
Start: 2018-02-27 | End: 2018-03-22 | Stop reason: DRUGHIGH

## 2018-03-01 NOTE — ASSESSMENT & PLAN NOTE
NEW DX, secondary to recent bronchial illness; CXR ordered with persistent cough;  PFTs done showing decreased FEV1 and FVC; empiric trial of symbicort 80/4.5 2puffs BID - gargle/spit after puffs #1, 1RF - reviewed how to use correctly and option of spacer use; f/u in 2-3 wks with f/u PFTs

## 2018-03-22 ENCOUNTER — OFFICE VISIT (OUTPATIENT)
Dept: INTERNAL MEDICINE | Facility: CLINIC | Age: 63
End: 2018-03-22

## 2018-03-22 VITALS
DIASTOLIC BLOOD PRESSURE: 70 MMHG | HEART RATE: 68 BPM | BODY MASS INDEX: 25.73 KG/M2 | SYSTOLIC BLOOD PRESSURE: 138 MMHG | WEIGHT: 169.25 LBS | RESPIRATION RATE: 18 BRPM

## 2018-03-22 DIAGNOSIS — R03.0 ELEVATED BLOOD PRESSURE READING WITHOUT DIAGNOSIS OF HYPERTENSION: ICD-10-CM

## 2018-03-22 DIAGNOSIS — J45.41 MODERATE PERSISTENT ASTHMATIC BRONCHITIS WITH ACUTE EXACERBATION: Primary | ICD-10-CM

## 2018-03-22 PROCEDURE — 94060 EVALUATION OF WHEEZING: CPT | Performed by: INTERNAL MEDICINE

## 2018-03-22 PROCEDURE — 99213 OFFICE O/P EST LOW 20 MIN: CPT | Performed by: INTERNAL MEDICINE

## 2018-03-22 RX ORDER — BUDESONIDE AND FORMOTEROL FUMARATE DIHYDRATE 160; 4.5 UG/1; UG/1
2 AEROSOL RESPIRATORY (INHALATION)
Qty: 1 INHALER | Refills: 5 | Status: SHIPPED | OUTPATIENT
Start: 2018-03-22 | End: 2018-08-06

## 2018-03-22 NOTE — ASSESSMENT & PLAN NOTE
Note normotensive readings at home 120s/70s; commended patient on healthy weight loss (has been watching portions, decreasing snacking, and increasing phys activity)

## 2018-03-22 NOTE — ASSESSMENT & PLAN NOTE
Improved with Symbicort 80/4.5, noting now nl PFTs; with some residual sxs (SOB//cough with talking), increase dose of symbicort 160/4.5 2 puffs BID, gargle/spit after puffs #1, 5RF

## 2018-03-22 NOTE — PROGRESS NOTES
Chief Complaint   Patient presents with   • Asthma     fu       History of Present Illness  63 y.o.  gentleman presents for follow-up re: coughing and chest tightness. Has had improvement in sxs with Symbicort inhaler with overall improved shortness of breath and decreased cough.  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  Notes some increased fatigue when used in conjunction with albuterol.    Home BP readings have 120s/70s.      Review of Systems  ROS (+) for decreased cough and chest tightness.  Denies wheezing, SOB, nasal congestion, ear sxs.  Denies fevers/chills, sweats.  All other ROS reviewed and negative.    [facility]  The following portions of the patient's history were reviewed and updated as appropriate: allergies, current medications, past family history, past medical history, past social history, past surgical history and problem list.    Current Outpatient Prescriptions:   •  aspirin 81 MG tablet, 2 QD  •  budesonide-formoterol (SYMBICORT) 80-4.5 2 puffs BID  •  Cholecalciferol (VITAMIN D) 2000 UNITS QD  •  Cinnamon 500 MG tablet, QD   •  fexofenadine (ALLEGRA) 180 MG tablet, QD  •  ibuprofen (ADVIL,MOTRIN) 600 MG tablet, prn  •  Multiple Vitamins-Minerals (CENTRUM SILVER ADULT 50+ PO), QD  •  Omega-3 Krill Oil 1000 MG capsule, qd  •  PROAIR  (90 Base) MCG/ACT inhaler, prn  •  Triamcinolone Acetonide (NASACORT AQ NA), 2 sprays/nostril QD  •  Turmeric 500 MG capsule, QD    VITALS:  /70 (BP Location: Right arm, Patient Position: Sitting)   Pulse 68   Resp 18   Wt 76.8 kg (169 lb 4 oz)   BMI 25.73 kg/m²     Physical Exam   Constitutional: He is oriented to person, place, and time. He appears well-developed and well-nourished.   Note 10-lb weight loss over last month   Eyes: Conjunctivae and EOM are normal.   Wears glasses   Cardiovascular: Normal rate, regular rhythm and normal heart sounds.    Pulmonary/Chest: Effort normal and  breath sounds normal. No respiratory distress. He has no wheezes. He has no rales.   Abdominal: Soft. Bowel sounds are normal.   Neurological: He is alert and oriented to person, place, and time.   Psychiatric: He has a normal mood and affect. His behavior is normal.   Nursing note and vitals reviewed.      LABS  PFTs: dx - asthmatic bronchitis; cough  Spirometry is within normal limits.    FEV1 82 %, FVC 77 %, FEV1/, 6-8% post-BDR    2/27/18 CXR - bibasilar atelectasis  1/18 A1C 5.5    ASSESSMENT/PLAN  Problem List Items Addressed This Visit     Asthmatic bronchitis - Primary     Improved with Symbicort 80/4.5, noting now nl PFTs; with some residual sxs (SOB//cough with talking), increase dose of symbicort 160/4.5 2 puffs BID, gargle/spit after puffs #1, 5RF         Relevant Medications    budesonide-formoterol (SYMBICORT) 160-4.5 MCG/ACT inhaler    Elevated blood pressure reading without diagnosis of hypertension     Note normotensive readings at home 120s/70s; commended patient on healthy weight loss (has been watching portions, decreasing snacking, and increasing phys activity)           Other Visit Diagnoses    None.         FOLLOW-UP  RTC 8/6/18 as scheduled with A1C    Electronically signed by:    Gina Adamson MD  03/22/2018

## 2018-04-23 PROBLEM — H25.13 AGE-RELATED NUCLEAR CATARACT OF BOTH EYES: Status: ACTIVE | Noted: 2018-04-23

## 2018-04-23 PROBLEM — H02.30 EYELID EXCESS SKIN: Status: ACTIVE | Noted: 2018-04-23

## 2018-08-05 PROBLEM — I10 ESSENTIAL HYPERTENSION: Status: ACTIVE | Noted: 2018-03-22

## 2018-08-06 ENCOUNTER — OFFICE VISIT (OUTPATIENT)
Dept: INTERNAL MEDICINE | Facility: CLINIC | Age: 63
End: 2018-08-06

## 2018-08-06 VITALS
DIASTOLIC BLOOD PRESSURE: 70 MMHG | WEIGHT: 174 LBS | BODY MASS INDEX: 26.46 KG/M2 | RESPIRATION RATE: 18 BRPM | HEART RATE: 64 BPM | SYSTOLIC BLOOD PRESSURE: 138 MMHG

## 2018-08-06 DIAGNOSIS — R73.01 IMPAIRED FASTING GLUCOSE: Primary | ICD-10-CM

## 2018-08-06 DIAGNOSIS — I10 ESSENTIAL HYPERTENSION: ICD-10-CM

## 2018-08-06 LAB — HBA1C MFR BLD: 5.3 %

## 2018-08-06 PROCEDURE — 83036 HEMOGLOBIN GLYCOSYLATED A1C: CPT | Performed by: INTERNAL MEDICINE

## 2018-08-06 PROCEDURE — 99213 OFFICE O/P EST LOW 20 MIN: CPT | Performed by: INTERNAL MEDICINE

## 2018-08-06 PROCEDURE — 90670 PCV13 VACCINE IM: CPT | Performed by: INTERNAL MEDICINE

## 2018-08-06 PROCEDURE — G0009 ADMIN PNEUMOCOCCAL VACCINE: HCPCS | Performed by: INTERNAL MEDICINE

## 2018-08-06 NOTE — ASSESSMENT & PLAN NOTE
BP stasble, wnl; no meds; rec low Na diet, increased aerobic exercise; home BP monitoring with goal BP < 130/80

## 2018-08-06 NOTE — PROGRESS NOTES
Chief Complaint   Patient presents with   • Hypertension     fu   • impaired fasting glucose     fu       History of Present Illness  63 y.o.  gentleman presents for sugar follow-up.  Feels well overall without complaints.  Reports stopping inhaler back in May-June and has not had any breathing problems since then.    Review of Systems  Denies CP, palpitations, SOB, cough, wheezing. All other ROS reviewed and negative.    [facility]  The following portions of the patient's history were reviewed and updated as appropriate: allergies, current medications, past family history, past medical history, past social history, past surgical history and problem list.    Current Outpatient Prescriptions:   •  aspirin 81 MG tablet, 2 QD  •  Cholecalciferol (VITAMIN D) 2000 UNITS capsule, QD  •  Cinnamon 500 MG tablet, QD  •  fexofenadine (ALLEGRA) 180 MG tablet, TDQ  •  Fluticasone Furoate (FLONASE SENSIMIST NA), AD  •  ibuprofen (ADVIL,MOTRIN) 600 MG tablet, prn  •  Multiple Vitamins-Minerals (CENTRUM SILVER ADULT 50+ PO), QD  •  Omega-3 Krill Oil 1000 MG capsule, DQ  •  Turmeric 500 MG capsule, QD      VITALS:  /70 (BP Location: Right arm, Patient Position: Sitting)   Pulse 64   Resp 18   Wt 78.9 kg (174 lb)   BMI 26.46 kg/m²     Physical Exam   Constitutional: He is oriented to person, place, and time. He appears well-developed and well-nourished.   Eyes: Conjunctivae and EOM are normal.   Baseline dysconjugate gaze   Cardiovascular: Normal rate, regular rhythm and normal heart sounds.    Pulmonary/Chest: Effort normal and breath sounds normal.   Abdominal: Soft. Bowel sounds are normal.   Neurological: He is alert and oriented to person, place, and time.   Psychiatric: He has a normal mood and affect. His behavior is normal.   Nursing note and vitals reviewed.      LABS  Results for orders placed or performed in visit on 08/06/18   POC Glycosylated Hemoglobin (Hb A1C)   Result Value Ref Range    Hemoglobin A1C  5.3 %     1/18 A1C 5.5    ASSESSMENT/PLAN  Problem List Items Addressed This Visit     Impaired fasting glucose - Primary     BG control stable/good with A1C 5.3; encouraged reg phys activity to decr insulin resistance, moderation in unhealthy starches/sweets; f/u A1C in 6 mos           Relevant Orders    POC Glycosylated Hemoglobin (Hb A1C) (Completed)    Hypertension     BP stasble, wnl; no meds; rec low Na diet, increased aerobic exercise; home BP monitoring with goal BP < 130/80               FOLLOW-UP  1. Health maintenance - Prevnar given today; rec Shingrix, counseling done  2. RTC 4 mos for welc to [facility] PE; fasting labs the week prior to appt (CBC, CMP, TSH, lipids, UA/micro, microalb, A1C)      Electronically signed by:    Gina Adamson MD  08/06/2018

## 2018-08-06 NOTE — ASSESSMENT & PLAN NOTE
BG control stable/good with A1C 5.3; encouraged reg phys activity to decr insulin resistance, moderation in unhealthy starches/sweets; f/u A1C in 6 mos

## 2018-09-27 ENCOUNTER — LAB (OUTPATIENT)
Dept: INTERNAL MEDICINE | Facility: CLINIC | Age: 63
End: 2018-09-27

## 2018-09-27 DIAGNOSIS — I10 ESSENTIAL HYPERTENSION: ICD-10-CM

## 2018-09-27 DIAGNOSIS — E78.5 DYSLIPIDEMIA: ICD-10-CM

## 2018-09-27 DIAGNOSIS — N28.9 RENAL INSUFFICIENCY: ICD-10-CM

## 2018-09-27 DIAGNOSIS — I45.10 BUNDLE BRANCH BLOCK, RIGHT: ICD-10-CM

## 2018-09-27 DIAGNOSIS — R73.01 IMPAIRED FASTING GLUCOSE: Primary | ICD-10-CM

## 2018-09-27 DIAGNOSIS — M16.11 PRIMARY OSTEOARTHRITIS OF RIGHT HIP: ICD-10-CM

## 2018-09-27 DIAGNOSIS — Z00.00 PE (PHYSICAL EXAM), ROUTINE: ICD-10-CM

## 2018-09-28 LAB
ALBUMIN SERPL-MCNC: 4.3 G/DL (ref 3.2–4.8)
ALBUMIN/CREAT UR: 25.9 MG/G CREAT (ref 0–30)
ALBUMIN/GLOB SERPL: 2.9 G/DL (ref 1.5–2.5)
ALP SERPL-CCNC: 56 U/L (ref 25–100)
ALT SERPL-CCNC: 19 U/L (ref 7–40)
APPEARANCE UR: CLEAR
AST SERPL-CCNC: 17 U/L (ref 0–33)
BACTERIA #/AREA URNS HPF: NORMAL /HPF
BASOPHILS # BLD AUTO: 0.03 10*3/MM3 (ref 0–0.2)
BASOPHILS NFR BLD AUTO: 0.5 % (ref 0–1)
BILIRUB SERPL-MCNC: 0.7 MG/DL (ref 0.3–1.2)
BILIRUB UR QL STRIP: NEGATIVE
BUN SERPL-MCNC: 19 MG/DL (ref 9–23)
BUN/CREAT SERPL: 21.6 (ref 7–25)
CALCIUM SERPL-MCNC: 9.1 MG/DL (ref 8.7–10.4)
CASTS URNS MICRO: NORMAL
CHLORIDE SERPL-SCNC: 106 MMOL/L (ref 99–109)
CHOLEST SERPL-MCNC: 156 MG/DL (ref 0–200)
CO2 SERPL-SCNC: 30 MMOL/L (ref 20–31)
COLOR UR: YELLOW
CREAT SERPL-MCNC: 0.88 MG/DL (ref 0.6–1.3)
CREAT UR-MCNC: 92.6 MG/DL
EOSINOPHIL # BLD AUTO: 0.1 10*3/MM3 (ref 0–0.3)
EOSINOPHIL NFR BLD AUTO: 1.5 % (ref 0–3)
EPI CELLS #/AREA URNS HPF: NORMAL /HPF
ERYTHROCYTE [DISTWIDTH] IN BLOOD BY AUTOMATED COUNT: 12.9 % (ref 11.3–14.5)
GLOBULIN SER CALC-MCNC: 1.5 GM/DL
GLUCOSE SERPL-MCNC: 84 MG/DL (ref 70–100)
GLUCOSE UR QL: NEGATIVE
HBA1C MFR BLD: 5.7 % (ref 4.8–5.6)
HCT VFR BLD AUTO: 47.9 % (ref 38.9–50.9)
HDLC SERPL-MCNC: 43 MG/DL (ref 40–60)
HGB BLD-MCNC: 15.6 G/DL (ref 13.1–17.5)
HGB UR QL STRIP: NEGATIVE
IMM GRANULOCYTES # BLD: 0.01 10*3/MM3 (ref 0–0.03)
IMM GRANULOCYTES NFR BLD: 0.2 % (ref 0–0.6)
KETONES UR QL STRIP: NEGATIVE
LDLC SERPL CALC-MCNC: 88 MG/DL (ref 0–100)
LEUKOCYTE ESTERASE UR QL STRIP: NEGATIVE
LYMPHOCYTES # BLD AUTO: 1.46 10*3/MM3 (ref 0.6–4.8)
LYMPHOCYTES NFR BLD AUTO: 22.1 % (ref 24–44)
MCH RBC QN AUTO: 30.4 PG (ref 27–31)
MCHC RBC AUTO-ENTMCNC: 32.6 G/DL (ref 32–36)
MCV RBC AUTO: 93.4 FL (ref 80–99)
MICROALBUMIN UR-MCNC: 24 UG/ML
MONOCYTES # BLD AUTO: 0.66 10*3/MM3 (ref 0–1)
MONOCYTES NFR BLD AUTO: 10 % (ref 0–12)
NEUTROPHILS # BLD AUTO: 4.37 10*3/MM3 (ref 1.5–8.3)
NEUTROPHILS NFR BLD AUTO: 65.9 % (ref 41–71)
NITRITE UR QL STRIP: NEGATIVE
PH UR STRIP: 6 [PH] (ref 5–8)
PLATELET # BLD AUTO: 194 10*3/MM3 (ref 150–450)
POTASSIUM SERPL-SCNC: 4 MMOL/L (ref 3.5–5.5)
PROT SERPL-MCNC: 5.8 G/DL (ref 5.7–8.2)
PROT UR QL STRIP: NEGATIVE
RBC # BLD AUTO: 5.13 10*6/MM3 (ref 4.2–5.76)
RBC #/AREA URNS HPF: NORMAL /HPF
SODIUM SERPL-SCNC: 148 MMOL/L (ref 132–146)
SP GR UR: 1.01 (ref 1–1.03)
TRIGL SERPL-MCNC: 126 MG/DL (ref 0–150)
TSH SERPL DL<=0.005 MIU/L-ACNC: 1.54 MIU/ML (ref 0.35–5.35)
UROBILINOGEN UR STRIP-MCNC: (no result) MG/DL
VLDLC SERPL CALC-MCNC: 25.2 MG/DL
WBC # BLD AUTO: 6.62 10*3/MM3 (ref 3.5–10.8)
WBC #/AREA URNS HPF: NORMAL /HPF

## 2018-10-02 ENCOUNTER — OFFICE VISIT (OUTPATIENT)
Dept: INTERNAL MEDICINE | Facility: CLINIC | Age: 63
End: 2018-10-02

## 2018-10-02 VITALS
DIASTOLIC BLOOD PRESSURE: 80 MMHG | HEART RATE: 70 BPM | OXYGEN SATURATION: 98 % | SYSTOLIC BLOOD PRESSURE: 142 MMHG | WEIGHT: 176 LBS | BODY MASS INDEX: 26.76 KG/M2

## 2018-10-02 DIAGNOSIS — E87.0 HYPERNATREMIA: ICD-10-CM

## 2018-10-02 DIAGNOSIS — Z00.00 WELCOME TO MEDICARE PREVENTIVE VISIT: Primary | ICD-10-CM

## 2018-10-02 DIAGNOSIS — H02.30 EXCESS SKIN OF EYELID, UNSPECIFIED LATERALITY: ICD-10-CM

## 2018-10-02 DIAGNOSIS — D17.1 LIPOMA OF TORSO: ICD-10-CM

## 2018-10-02 DIAGNOSIS — L29.9 ITCHING OF EAR: ICD-10-CM

## 2018-10-02 DIAGNOSIS — I10 ESSENTIAL HYPERTENSION: ICD-10-CM

## 2018-10-02 DIAGNOSIS — R73.01 IMPAIRED FASTING GLUCOSE: ICD-10-CM

## 2018-10-02 LAB
BUN SERPL-MCNC: 18 MG/DL (ref 9–23)
BUN/CREAT SERPL: 18.2 (ref 7–25)
CALCIUM SERPL-MCNC: 9.4 MG/DL (ref 8.7–10.4)
CHLORIDE SERPL-SCNC: 101 MMOL/L (ref 99–109)
CO2 SERPL-SCNC: 31 MMOL/L (ref 20–31)
CREAT SERPL-MCNC: 0.99 MG/DL (ref 0.6–1.3)
GLUCOSE SERPL-MCNC: 91 MG/DL (ref 70–100)
POTASSIUM SERPL-SCNC: 4.4 MMOL/L (ref 3.5–5.5)
SODIUM SERPL-SCNC: 139 MMOL/L (ref 132–146)

## 2018-10-02 PROCEDURE — 96160 PT-FOCUSED HLTH RISK ASSMT: CPT | Performed by: INTERNAL MEDICINE

## 2018-10-02 PROCEDURE — G0402 INITIAL PREVENTIVE EXAM: HCPCS | Performed by: INTERNAL MEDICINE

## 2018-10-02 PROCEDURE — G0008 ADMIN INFLUENZA VIRUS VAC: HCPCS | Performed by: INTERNAL MEDICINE

## 2018-10-02 PROCEDURE — 90674 CCIIV4 VAC NO PRSV 0.5 ML IM: CPT | Performed by: INTERNAL MEDICINE

## 2018-10-02 PROCEDURE — G0403 EKG FOR INITIAL PREVENT EXAM: HCPCS | Performed by: INTERNAL MEDICINE

## 2018-10-02 PROCEDURE — 90471 IMMUNIZATION ADMIN: CPT | Performed by: INTERNAL MEDICINE

## 2018-10-02 PROCEDURE — 90632 HEPA VACCINE ADULT IM: CPT | Performed by: INTERNAL MEDICINE

## 2018-10-02 NOTE — PROGRESS NOTES
ANNUAL WELLNESS VISIT    DRUG AND ALCOHOL USE   no tob use   no alcohol use    DIET AND PHYSICAL ACTIVITY     Diet: general    Exercise: daily   Exercise Details: walking    MOOD DISORDER AND COGNITIVE SCREENING   Depression Screening Tool Used yes - see PHQ-9   Anxiety Screening Tool Used yes     Mini-Cog Performed   Yes    1. Tell Patient 3 Words Apple, table, dinesh    2. Administer Clock Test normal    3. Recall 3 words  Apple,table,dinesh    4. Number Correct Items 3    FUNCTIONAL ABILITY AND LEVEL OF SAFETY   Hearing no hearing loss     Wears Hearing Aids No       Current Activities Independent      none  - see Funct/Cog Status Intake     Fall Risk Assessment       Has difficulty with walking or balance  No         Timed Up and Go (TUG) Test  10 sec.       If >12 sec, normal    ADVANCED DIRECTIVE has NO advance directive - not interested in additional information    PAIN SCREENING Do you have pain right now? no      If so, 1-10 scale: 0          Do you have pain every day? No      Probable chronic pain: No     Recent Hospitalizations:  No recent hospitalization(s)..     MEDICATION REVIEW   - updated and reviewed (see Medication List).   - reviewed for potentially harmful drug-disease interactions in the elderly.   - reviewed for high risk medications in the elderly.   - aspirin use: Yes - 2 baby aspirins QD; rec decr down to ASA 81mg QD (just 1/day)    BMI  Body mass index is 26.76 kg/m².    Patient's Body mass index is 26.76 kg/m². BMI is above normal parameters. Recommendations include: exercise counseling and nutrition counseling.    _________________________________________________________  Chief Complaint   Patient presents with   • Welcome to Medicare       History of Present Illness  63 y.o.  gentleman presents for Welc to [facility] PE.  Home BPs are 120s/70s. Just got back from vacation close to [facility].    Thinking about getting eyelid surgery; son has recommended plastics at [facility].  He states  he has not had visual field testing yet.    Review of Systems  Denies headaches, visual changes, CP, palpitations, SOB, cough, abd pain, n/v/d, difficulty with urination, numbness/tingling, falls, mood changes, lightheadedness, hearing changes, rashes.     All other ROS reviewed and negative.    [facility]  The following portions of the patient's history were reviewed and updated as appropriate: allergies, current medications, past family history, past medical history, past social history, past surgical history and problem list.    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    VITALS:  /80   Pulse 70   Wt 79.8 kg (176 lb)   SpO2 98%   BMI 26.76 kg/m²   VISUAL ACUITY - (+)corrected  Right Eye (OD):  20/20  Left Eye (OS):  20/20  Both eyes (OU): 20/20    Physical Exam   Constitutional: He is oriented to person, place, and time. He appears well-developed and well-nourished.   HENT:   Head: Normocephalic.   Right Ear: External ear normal.   Left Ear: External ear normal.   Nose: Nose normal.   Mouth/Throat: Oropharynx is clear and moist and mucous membranes are normal. No oropharyngeal exudate.   Flaky ear canal right side, no cerumen bilaterally; finger rubbing hearing intact bilaterally   Eyes: Pupils are equal, round, and reactive to light. Conjunctivae and EOM are normal.   Wears glasses; baseline dysconjugate gaze (left eye medial deviation); mild ptosis bilaterally - no obvious obstruction   Neck: Normal range of motion. Neck supple.  Carotid bruit is not present (bilaterally). No thyromegaly present.   Cardiovascular: Normal rate, regular rhythm and normal heart sounds.    Pulmonary/Chest: Effort normal and breath sounds normal. No respiratory distress. He has no wheezes. He has no rales.   Abdominal: Soft. Bowel sounds are normal. He exhibits no distension and no mass. There is no hepatosplenomegaly. There is no tenderness.   Diffuse dime-nickel-sized well-circ smooth SQ nodules on abd, nontender (c/w lipomas)   Musculoskeletal: Normal range of motion. He exhibits no edema.   Lymphadenopathy:     He has no cervical adenopathy.   Neurological: He is alert and oriented to person, place, and time. He has normal reflexes. He displays normal reflexes. No cranial nerve deficit.   Skin: Skin is warm and dry. No rash noted.   Psychiatric: He has a normal mood and affect. His behavior is normal.   Nursing note and vitals reviewed.      LABS  Results for orders placed or performed in visit on 09/27/18   Comprehensive metabolic panel   Result Value Ref Range    Glucose 84 70 - 100 mg/dL    BUN 19 9 - 23 mg/dL    Creatinine 0.88 0.60 - 1.30 mg/dL    eGFR Non African Am 87 >60 mL/min/1.73    eGFR African Am 105 >60 mL/min/1.73    BUN/Creatinine Ratio 21.6 7.0 - 25.0    Sodium 148 (H) 132 - 146 mmol/L    Potassium 4.0 3.5 - 5.5 mmol/L    Chloride 106 99 - 109 mmol/L    Total CO2 30.0 20.0 - 31.0 mmol/L    Calcium 9.1 8.7 - 10.4 mg/dL    Total Protein 5.8 5.7 - 8.2 g/dL    Albumin 4.30 3.20 - 4.80 g/dL    Globulin 1.5 gm/dL    A/G Ratio 2.9 (H) 1.5 - 2.5 g/dL    Total Bilirubin 0.7 0.3 - 1.2 mg/dL    Alkaline Phosphatase 56 25 - 100 U/L    AST (SGOT) 17 0 - 33 U/L    ALT (SGPT) 19 7 - 40 U/L   TSH   Result Value Ref Range    TSH 1.542 0.350 - 5.350 mIU/mL   Lipid panel   Result Value Ref Range    Total Cholesterol 156 0 - 200 mg/dL    Triglycerides 126 0 - 150 mg/dL    HDL Cholesterol 43 40 - 60 mg/dL    VLDL Cholesterol 25.2 mg/dL    LDL Cholesterol  88 0 -  100 mg/dL   Microalbumin / Creatinine Urine Ratio - Urine, Clean Catch   Result Value Ref Range    Creatinine, Urine 92.6 Not Estab. mg/dL    Microalbumin, Urine 24.0 Not Estab. ug/mL    Microalbumin/Creatinine Ratio 25.9 0.0 - 30.0 mg/g creat   Hemoglobin A1c   Result Value Ref Range    Hemoglobin A1C 5.70 (H) 4.80 - 5.60 %   Microscopic Examination   Result Value Ref Range    WBC, UA Comment /HPF    RBC, UA Comment /HPF    Epithelial Cells (non renal) Comment /HPF    Cast Type Comment     Bacteria, UA Comment /HPF   CBC w AUTO Differential   Result Value Ref Range    WBC 6.62 3.50 - 10.80 10*3/mm3    RBC 5.13 4.20 - 5.76 10*6/mm3    Hemoglobin 15.6 13.1 - 17.5 g/dL    Hematocrit 47.9 38.9 - 50.9 %    MCV 93.4 80.0 - 99.0 fL    MCH 30.4 27.0 - 31.0 pg    MCHC 32.6 32.0 - 36.0 g/dL    RDW 12.9 11.3 - 14.5 %    Platelets 194 150 - 450 10*3/mm3    Neutrophil Rel % 65.9 41.0 - 71.0 %    Lymphocyte Rel % 22.1 (L) 24.0 - 44.0 %    Monocyte Rel % 10.0 0.0 - 12.0 %    Eosinophil Rel % 1.5 0.0 - 3.0 %    Basophil Rel % 0.5 0.0 - 1.0 %    Neutrophils Absolute 4.37 1.50 - 8.30 10*3/mm3    Lymphocytes Absolute 1.46 0.60 - 4.80 10*3/mm3    Monocytes Absolute 0.66 0.00 - 1.00 10*3/mm3    Eosinophils Absolute 0.10 0.00 - 0.30 10*3/mm3    Basophils Absolute 0.03 0.00 - 0.20 10*3/mm3    Immature Granulocyte Rel % 0.2 0.0 - 0.6 %    Immature Grans Absolute 0.01 0.00 - 0.03 10*3/mm3   Urinalysis With Microscopic - Urine, Clean Catch   Result Value Ref Range    Specific Gravity, UA 1.015 1.001 - 1.030    pH, UA 6.0 5.0 - 8.0    Color, UA Yellow     Appearance, UA Clear Clear    Leukocytes, UA Negative Negative    Protein Negative Negative    Glucose, UA Negative Negative    Ketones Negative Negative    Blood, UA Negative Negative    Bilirubin, UA Negative Negative    Urobilinogen, UA Comment     Nitrite, UA Negative Negative       ECG 12 Lead  Date/Time: 10/2/2018 10:09 AM  Performed by: CHERISE THOMPSON  Authorized by: CHERISE THOMPSON    Comparison: compared with previous ECG from 2/5/2018  Similar to previous ECG  Rhythm: sinus rhythm  Rate: normal  BPM: 72  Conduction: conduction normal  ST Segments: ST segments normal  QRS axis: normal  Other findings comments: nonspecific ST-T changes  Clinical impression: normal ECG  Clinical impression comment: stable EKG            ASSESSMENT/PLAN  Problem List Items Addressed This Visit     Impaired fasting glucose     BG control bord with A1C 5.7; encouraged reg phys activity to decr insulin resistance, moderation in unhealthy starches/sweets; f/u A1C in 6 mos           Itching of ear     Exam today c/w R. Ear seb dermatitis; he has a cream per Dr. Amador         Lipoma     Multiple and diffuse on the abd, asx, follow clinically         Hypertension     BP mildly elevated; no current meds; rec low Na diet, increased aerobic exercise; home BP monitoring with goal BP < 130/80; f/u in 6 mos at the latest         Relevant Orders    ECG 12 Lead    Eyelid excess skin     Followed by Dr. Aragon, rec consultation with oculoplastics, such as Dr. Ochoa, if he is interested in pursuing surgical options         Welcome to Medicare preventive visit - Primary     Health maintenance - flu vacc and HAV #1 given today; HAV #2 will be due 4/2019; Tdap 9/13, Zostavax 12/16; rec Shingrix; Prevnar 8/18, plan for PVX next yr; colonosc 7/17, repeat 2022 woth Dr. George; PSA stable; eye exam 4/18 with Dr. Aragon; dental exam yesterday, done q6 mos; (+) seat belt use    Consultants:  Patient Care Team:  Gina Adamson MD as PCP - Franny Treviño MD as Consulting Physician (Dermatology)  Erwin George MD as Consulting Physician (Gastroenterology)  Yoseph Aragon MD as Consulting Physician (Ophthalmology)  Paramjit Trevino MD as Consulting Physician (Orthopedic Surgery)  Remington Eller DPM as Consulting Physician (Podiatry)               Other Visit Diagnoses     Hypernatremia         likely due to mild dehydration from being fasting for labs; repeat BMP on the way out the door    Relevant Orders    Basic Metabolic Panel          FOLLOW-UP  RTC 6 mos with A1C and BP check and HAV #2    Electronically signed by:    Gina Adamson MD  10/02/2018

## 2018-10-02 NOTE — ASSESSMENT & PLAN NOTE
Health maintenance - flu vacc and HAV #1 given today; HAV #2 will be due 4/2019; Tdap 9/13, Zostavax 12/16; rec Shingrix; Prevnar 8/18, plan for PVX next yr; colonosc 7/17, repeat 2022 woth Dr. George; PSA stable; eye exam 4/18 with Dr. Aragon; dental exam yesterday, done q6 mos; (+) seat belt use    Consultants:  Patient Care Team:  Gina Adamson MD as PCP - General  [facility], Franny Alegria MD as Consulting Physician (Dermatology)  Erwin George MD as Consulting Physician (Gastroenterology)  Yoseph Aragon MD as Consulting Physician (Ophthalmology)  Paramjit Trevino MD as Consulting Physician (Orthopedic Surgery)  Remington Eller DPM as Consulting Physician (Podiatry)

## 2018-10-02 NOTE — ASSESSMENT & PLAN NOTE
Followed by Dr. Aragon, rec consultation with oculoplastics, such as Dr. Ochoa, if he is interested in pursuing surgical options

## 2018-10-02 NOTE — ASSESSMENT & PLAN NOTE
BP mildly elevated; no current meds; rec low Na diet, increased aerobic exercise; home BP monitoring with goal BP < 130/80; f/u in 6 mos at the latest

## 2018-10-02 NOTE — ASSESSMENT & PLAN NOTE
BG control bord with A1C 5.7; encouraged reg phys activity to decr insulin resistance, moderation in unhealthy starches/sweets; f/u A1C in 6 mos

## 2018-10-04 NOTE — PROGRESS NOTES
Sodium level back to normal; ensure drinking plenty of water during the daytime; no further eval needed at this time

## 2018-10-11 ENCOUNTER — TELEPHONE (OUTPATIENT)
Dept: INTERNAL MEDICINE | Facility: CLINIC | Age: 63
End: 2018-10-11

## 2018-10-11 NOTE — TELEPHONE ENCOUNTER
AURY,    MR SALGADO RETURNED YOUR CALL THIS MORNING. ADVISED HIM THAT YOU WERE UNAVAILABLE AT THE TIME OF HIS CALL AND THAT I WOULD LET YOU KNOW THAT HE RETURNED YOUR CALL.

## 2019-04-09 ENCOUNTER — OFFICE VISIT (OUTPATIENT)
Dept: INTERNAL MEDICINE | Facility: CLINIC | Age: 64
End: 2019-04-09

## 2019-04-09 VITALS
BODY MASS INDEX: 27.89 KG/M2 | OXYGEN SATURATION: 98 % | HEART RATE: 67 BPM | DIASTOLIC BLOOD PRESSURE: 66 MMHG | HEIGHT: 68 IN | WEIGHT: 184 LBS | SYSTOLIC BLOOD PRESSURE: 126 MMHG

## 2019-04-09 DIAGNOSIS — I10 ESSENTIAL HYPERTENSION: ICD-10-CM

## 2019-04-09 DIAGNOSIS — R73.01 IMPAIRED FASTING GLUCOSE: Primary | ICD-10-CM

## 2019-04-09 DIAGNOSIS — M19.042 PRIMARY OSTEOARTHRITIS OF BOTH HANDS: ICD-10-CM

## 2019-04-09 DIAGNOSIS — M19.041 PRIMARY OSTEOARTHRITIS OF BOTH HANDS: ICD-10-CM

## 2019-04-09 LAB — HBA1C MFR BLD: 5.8 %

## 2019-04-09 PROCEDURE — 83036 HEMOGLOBIN GLYCOSYLATED A1C: CPT | Performed by: INTERNAL MEDICINE

## 2019-04-09 PROCEDURE — 99214 OFFICE O/P EST MOD 30 MIN: CPT | Performed by: INTERNAL MEDICINE

## 2019-04-09 NOTE — ASSESSMENT & PLAN NOTE
L>R; already on turmeric; rec heat, prn tyl (extra strength 2 tid prn); already on turmeric; could try glucosamine/chondroitin sulfate or black cherry extract for supplement

## 2019-04-09 NOTE — ASSESSMENT & PLAN NOTE
BG control stable with A1C 5.8; encouraged reg phys activity to decr insulin resistance, moderation in unhealthy starches/sweets; f/u A1C in 6 mos

## 2019-04-09 NOTE — ASSESSMENT & PLAN NOTE
Repeat BP wnl; no meds; rec low Na diet, increased aerobic exercise; rec resuming home BP monitoring about 2x/week with goal BP < 130/80

## 2019-04-09 NOTE — PROGRESS NOTES
"Chief Complaint   Patient presents with   • Impaired fasting glucose   • Joint Pain       History of Present Illness  64 y.o.  gentleman presents for sugar follow-up.  Notes he has not been as active as he intended.  Started taking blood pressures initially after our last visit, ranging 120s/80s; therefore, has not been checking blood pressures lately.    States he went to [place] on and had a respiratory infection for about a week; used inhaler and it resolved on its own.    Complains of increased finger pain L>R, socially over the last month.  Rates the pain as a 6 or 7 out of 10.  Has no associated joint swelling.  Has had no injuries or falls.  Has not taken anything for the pain.  Denies any associated numbness/tingling.    Review of Systems  ROS (+) for hand finger joint pain L>R without swelling. Denies CP, palpitations, SOB.   All other ROS reviewed and negative.    [facility]  The following portions of the patient's history were reviewed and updated as appropriate: allergies, current medications, past family history, past medical history, past social history, past surgical history and problem list.    Current Outpatient Medications:   •  aspirin 81 MG tablet, QD  •  Cholecalciferol (VITAMIN D) 2000 UNITS capsule, QD  •  Cinnamon 500 MG tablet, QD  •  fexofenadine (ALLEGRA) 180 MG tablet, QD  •  Fluticasone Furoate (FLONASE SENSIMIST NA), AD  •  ibuprofen (ADVIL,MOTRIN) 600 MG tablet, prn  •  Multiple Vitamins-Minerals (CENTRUM SILVER ADULT 50+ PO), QD  •  Omega-3 Krill Oil 1000 MG capsule, QD  •  Turmeric 500 MG capsule, QD      VITALS:  /66 (BP Location: Left arm, Patient Position: Sitting)   Pulse 67   Ht 172.7 cm (68\")   Wt 83.5 kg (184 lb)   SpO2 98%   BMI 27.98 kg/m²     Physical Exam   Constitutional: He is oriented to person, place, and time. He appears well-developed and well-nourished.   Eyes: Conjunctivae and EOM are normal.   Wears glasses   Cardiovascular: Normal rate, regular " rhythm and normal heart sounds.   Pulmonary/Chest: Effort normal and breath sounds normal. No respiratory distress.   Musculoskeletal: He exhibits deformity (mild bony arthritis changes in fingers of bilat hands, no swelling, erythema, or warmth; fingers FROM).   Neurological: He is alert and oriented to person, place, and time. No sensory deficit.   Psychiatric: He has a normal mood and affect. His behavior is normal.   Nursing note and vitals reviewed.      LABS  Results for orders placed or performed in visit on 04/09/19   POC Glycosylated Hemoglobin (Hb A1C)   Result Value Ref Range    Hemoglobin A1C 5.8 %     9/18 A1c 5.7    ASSESSMENT/PLAN  Problem List Items Addressed This Visit     Impaired fasting glucose - Primary     BG control stable with A1C 5.8; encouraged reg phys activity to decr insulin resistance, moderation in unhealthy starches/sweets; f/u A1C in 6 mos           Relevant Orders    POC Glycosylated Hemoglobin (Hb A1C) (Completed)    Hypertension     Repeat BP wnl; no meds; rec low Na diet, increased aerobic exercise; rec resuming home BP monitoring about 2x/week with goal BP < 130/80           Osteoarthritis of both hands     L>R; already on turmeric; rec heat, prn tyl (extra strength 2 tid prn); already on turmeric; could try glucosamine/chondroitin sulfate or black cherry extract for supplement               FOLLOW-UP  1. Health maintenance - due for 2nd hep A and 2nd Shingrix (reports Shingrix #1 in Jan/Feb 2019) - rec getting at pharmacy  2. RTC for initial AWV ([facility] PE) after 10/2/19; fasting labs the week prior to appt (CBC, CMP, TSH, lipids, UA/micro, PSA, microalb, A1C)      Electronically signed by:    Gina Adamson MD  04/09/2019

## 2019-07-26 PROBLEM — H57.813 PTOSIS OF BOTH EYEBROWS: Status: ACTIVE | Noted: 2019-07-26

## 2019-07-26 PROBLEM — H04.123 DRY EYES, BILATERAL: Status: ACTIVE | Noted: 2019-07-26

## 2019-09-13 ENCOUNTER — OFFICE VISIT (OUTPATIENT)
Dept: INTERNAL MEDICINE | Facility: CLINIC | Age: 64
End: 2019-09-13

## 2019-09-13 VITALS
HEIGHT: 68 IN | DIASTOLIC BLOOD PRESSURE: 76 MMHG | BODY MASS INDEX: 26.64 KG/M2 | WEIGHT: 175.8 LBS | HEART RATE: 83 BPM | RESPIRATION RATE: 20 BRPM | SYSTOLIC BLOOD PRESSURE: 118 MMHG | OXYGEN SATURATION: 97 % | TEMPERATURE: 98.6 F

## 2019-09-13 DIAGNOSIS — J30.2 SEASONAL ALLERGIC RHINITIS, UNSPECIFIED TRIGGER: ICD-10-CM

## 2019-09-13 DIAGNOSIS — I10 ESSENTIAL HYPERTENSION: ICD-10-CM

## 2019-09-13 DIAGNOSIS — J06.9 VIRAL UPPER RESPIRATORY TRACT INFECTION: Primary | ICD-10-CM

## 2019-09-13 LAB
EXPIRATION DATE: NORMAL
EXPIRATION DATE: NORMAL
FLUAV AG NPH QL: NEGATIVE
FLUBV AG NPH QL: NEGATIVE
INTERNAL CONTROL: NORMAL
INTERNAL CONTROL: NORMAL
Lab: NORMAL
Lab: NORMAL
S PYO AG THROAT QL: NEGATIVE

## 2019-09-13 PROCEDURE — 87880 STREP A ASSAY W/OPTIC: CPT | Performed by: INTERNAL MEDICINE

## 2019-09-13 PROCEDURE — 87804 INFLUENZA ASSAY W/OPTIC: CPT | Performed by: INTERNAL MEDICINE

## 2019-09-13 PROCEDURE — 99213 OFFICE O/P EST LOW 20 MIN: CPT | Performed by: INTERNAL MEDICINE

## 2019-09-13 NOTE — ASSESSMENT & PLAN NOTE
Continue Allegra once daily; reviewed how to utilize Flonase correctly; also recommend addition of nasal saline spray or Kika pot

## 2019-09-13 NOTE — PROGRESS NOTES
"Chief Complaint   Patient presents with   • Cough   • URI       History of Present Illness  64 y.o.  gentleman presents for further eval of cold sxs.  HPI started 2 weeks ago with sore throat and stopped up ears.  Also with nasal drainage.  Thought he was getting better but then this week, has developed chills with increased nasal congestion and increased cough.  Wife also sick.  Denies fevers.  Has been taking mucinex DM 1/day.  Remains on flonase and allegra.     Has been helping son with hemp in barn, exposed to particulate.    Review of Systems  ROS (+) for sinus congestion, stopped up ears, nasal drainage, cough with congestion.  Denies fevers but has had some chills.  Denies sore throat.  Denies shortness of breath or chest tightness.  Denies chest pain.  All other ROS reviewed and negative.    [facility]  The following portions of the patient's history were reviewed and updated as appropriate: allergies, current medications, past family history, past medical history, past social history, past surgical history and problem list.    Current Outpatient Medications:   •  aspirin 81 MG tablet, QD  •  Cholecalciferol (VITAMIN D) 2000 UNITS QD  •  Cinnamon 500 MG tablet,QD   •  fexofenadine (ALLEGRA) 180 MG QD  •  Fluticasone Furoate (FLONASE SENSIMIST NA), AD  •  ibuprofen (ADVIL,MOTRIN) 600 MG tablet, BId prn  •  CENTRUM SILVER ADULT 50+ PO QD  •  OCUVITE-LUTEIN PO, PreserVision AREDS-2,   •  Omega-3 Krill Oil 1000 MG QD  •  Turmeric 500 MG QD     VITALS:  /76   Pulse 83   Temp 98.6 °F (37 °C) (Oral)   Resp 20   Ht 172.7 cm (68\")   Wt 79.7 kg (175 lb 12.8 oz)   SpO2 97%   BMI 26.73 kg/m²     Physical Exam   Constitutional: He is oriented to person, place, and time. He appears well-developed and well-nourished.   Sounds mildly congested   HENT:   Right Ear: External ear normal.   Left Ear: External ear normal.   Mouth/Throat: No oropharyngeal exudate (Posterior drainage without exudate, erythema, or " swelling).   Nasal mucosa moderately swollen bilaterally, minimal erythema, no active drainage   Eyes: Conjunctivae and EOM are normal.   Neck: Normal range of motion. Neck supple.   Cardiovascular: Normal rate, regular rhythm and normal heart sounds.   Pulmonary/Chest: Effort normal and breath sounds normal. No stridor. No respiratory distress. He has no wheezes. He has no rales.   Speaks in complete sentences; no coughing during entire office visit today   Abdominal: Soft. Bowel sounds are normal.   Lymphadenopathy:     He has no cervical adenopathy.   Neurological: He is alert and oriented to person, place, and time.   Psychiatric: He has a normal mood and affect. His behavior is normal.   Nursing note and vitals reviewed.      LABS  Results for orders placed or performed in visit on 09/13/19   POC Influenza A / B   Result Value Ref Range    Rapid Influenza A Ag Negative Negative    Rapid Influenza B Ag Negative Negative    Internal Control Passed Passed    Lot Number [id number]     Expiration Date 11/17/2021    POC Rapid Strep A   Result Value Ref Range    Rapid Strep A Screen Negative Negative, VALID, INVALID, Not Performed    Internal Control Passed Passed    Lot Number [id number]     Expiration Date 10/31/2020        ASSESSMENT/PLAN  Problem List Items Addressed This Visit     Hypertension     Stable BP, at goal, no meds         Allergic rhinitis     Continue Allegra once daily; reviewed how to utilize Flonase correctly; also recommend addition of nasal saline spray or [place] pot           Other Visit Diagnoses     Viral upper respiratory tract infection    -  Primary    neg for flu; cont allegra and flonase, rec add nasal saline spray or netipot; augment mucinex DM to 1-2 bid prn; drink plenty of water; f/u prn    Relevant Orders    POC Influenza A / B (Completed)    POC Rapid Strep A (Completed)      -Note history of asthmatic bronchitis; recommend follow-up should he develop chest tightness, wheezing, or  shortness of breath; lung exam unremarkable today    FOLLOW-UP  RTC for initial AWV 10/22/19 as scheduled; fasting labs prior to appt (CBC, CMP, TSH, lipids, UA/micro, PSA, A1C, microalb)    Electronically signed by:    Gina Adamson MD  09/13/2019

## 2019-09-16 ENCOUNTER — HOSPITAL ENCOUNTER (OUTPATIENT)
Dept: GENERAL RADIOLOGY | Facility: HOSPITAL | Age: 64
Discharge: HOME | End: 2019-09-16
Admitting: INTERNAL MEDICINE

## 2019-09-16 ENCOUNTER — OFFICE VISIT (OUTPATIENT)
Dept: INTERNAL MEDICINE | Facility: CLINIC | Age: 64
End: 2019-09-16

## 2019-09-16 VITALS
WEIGHT: 175 LBS | OXYGEN SATURATION: 99 % | DIASTOLIC BLOOD PRESSURE: 60 MMHG | SYSTOLIC BLOOD PRESSURE: 120 MMHG | TEMPERATURE: 98.4 F | HEART RATE: 71 BPM | HEIGHT: 68 IN | BODY MASS INDEX: 26.52 KG/M2

## 2019-09-16 DIAGNOSIS — I10 ESSENTIAL HYPERTENSION: ICD-10-CM

## 2019-09-16 DIAGNOSIS — J30.2 SEASONAL ALLERGIC RHINITIS, UNSPECIFIED TRIGGER: ICD-10-CM

## 2019-09-16 DIAGNOSIS — J40 BRONCHITIS: Primary | ICD-10-CM

## 2019-09-16 DIAGNOSIS — J40 BRONCHITIS: ICD-10-CM

## 2019-09-16 PROCEDURE — 99214 OFFICE O/P EST MOD 30 MIN: CPT | Performed by: INTERNAL MEDICINE

## 2019-09-16 PROCEDURE — 71046 X-RAY EXAM CHEST 2 VIEWS: CPT

## 2019-09-16 RX ORDER — AMOXICILLIN AND CLAVULANATE POTASSIUM 875; 125 MG/1; MG/1
1 TABLET, FILM COATED ORAL 2 TIMES DAILY
Qty: 14 TABLET | Refills: 0 | Status: SHIPPED | OUTPATIENT
Start: 2019-09-16 | End: 2019-09-23

## 2019-09-16 NOTE — PROGRESS NOTES
"Chief Complaint   Patient presents with   • URI   • Cough       History of Present Illness  64 y.o.  gentleman presents for continued cold symptoms.  Started feeling a bit better after last visit last week but has had persistent chills and notes now cough is productive.  Denies any chest tightness or wheezing.  Denies any shortness of breath.  Denies any fevers but continues to have the chills and some body aches as well as generalized weakness.  Denies any sore throat or significant runny nose.  Has continued Allegra and Flonase.    Review of Systems  ROS (+) for productive cough as well as chills; denies fevers, chest pain, chest tightness, shortness of breath, wheezing, nausea/vomiting.  All other ROS reviewed and negative.    [facility]  The following portions of the patient's history were reviewed and updated as appropriate: allergies, current medications, past family history, past medical history, past social history, past surgical history and problem list.      Current Outpatient Medications:   •  aspirin 81 MG tablet, QD  •  Cholecalciferol (VITAMIN D) 2000 UNITS QD  •  Cinnamon 500 MG tablet, QD  •  fexofenadine (ALLEGRA) 180 MG QD  •  Fluticasone Furoate (FLONASE SENSIMIST NA), ,Ad  •  ibuprofen (ADVIL,MOTRIN) 600 MG tablet, BID prn  •  Multiple Vitamins-Minerals (CENTRUM SILVER ADULT 50+ PO), QD  •  Multiple Vitamins-Minerals (OCUVITE-LUTEIN PO), PreserVision AREDS-2, QD  •  [place]-3 Krill Oil 1000 MG QD  •  Turmeric 500 MG QD    VITALS:  /60   Pulse 71   Temp 98.4 °F (36.9 °C)   Ht 172.7 cm (68\")   Wt 79.4 kg (175 lb)   SpO2 99%   BMI 26.61 kg/m²     Physical Exam   Constitutional: He is oriented to person, place, and time. He appears well-developed and well-nourished. No distress.   Speaks in complete sentences, no breathlessness   HENT:   Right Ear: External ear normal.   Left Ear: External ear normal.   Mouth/Throat: No oropharyngeal exudate (Posterior drainage without exudate, " erythema, or swelling).   Moderately swollen nasal mucosa bilaterally   Eyes: Conjunctivae are normal.   Cardiovascular: Normal rate, regular rhythm and normal heart sounds.   Pulmonary/Chest: Effort normal. No stridor. No respiratory distress. He has no wheezes. He has rales (Faint rhonchi at left base). He exhibits no tenderness.   Abdominal: Soft. Bowel sounds are normal.   Musculoskeletal: Normal range of motion.   Normal gait   Neurological: He is alert and oriented to person, place, and time.   Skin: Skin is warm and dry.   Psychiatric: He has a normal mood and affect. His behavior is normal.   Nursing note and vitals reviewed.      LABS  Results for orders placed or performed in visit on 09/13/19   POC Influenza A / B   Result Value Ref Range    Rapid Influenza A Ag Negative Negative    Rapid Influenza B Ag Negative Negative    Internal Control Passed Passed    Lot Number [id number]     Expiration Date 11/17/2021    POC Rapid Strep A   Result Value Ref Range    Rapid Strep A Screen Negative Negative, VALID, INVALID, Not Performed    Internal Control Passed Passed    Lot Number [id number]     Expiration Date 10/31/2020        ASSESSMENT/PLAN  Problem List Items Addressed This Visit     Hypertension     BP remains stable 120/60, no meds         Allergic rhinitis     Commend continue Allegra and Flonase as directed; also consider addition of nasal saline spray           Other Visit Diagnoses     Bronchitis    -  Primary    vs pneumonia; abnl lung exam L. base; cont mucinex DM prn and add augmentin 875mg BID x 7d; check CXR; drink lots of fluids; f/u in 1 wk    Relevant Medications    amoxicillin-clavulanate (AUGMENTIN) 875-125 MG per tablet    Other Relevant Orders    XR Chest PA & Lateral (Completed)          FOLLOW-UP  RTC 1 week for follow-up with presumed bronchitis versus pneumonia    Electronically signed by:    Gina Adamson MD  09/16/2019

## 2019-09-17 ENCOUNTER — TELEPHONE (OUTPATIENT)
Dept: INTERNAL MEDICINE | Facility: CLINIC | Age: 64
End: 2019-09-17

## 2019-09-19 ENCOUNTER — PATIENT MESSAGE (OUTPATIENT)
Dept: INTERNAL MEDICINE | Facility: CLINIC | Age: 64
End: 2019-09-19

## 2019-09-26 ENCOUNTER — OFFICE VISIT (OUTPATIENT)
Dept: INTERNAL MEDICINE | Facility: CLINIC | Age: 64
End: 2019-09-26

## 2019-09-26 VITALS
HEART RATE: 76 BPM | HEIGHT: 68 IN | BODY MASS INDEX: 26.07 KG/M2 | SYSTOLIC BLOOD PRESSURE: 138 MMHG | WEIGHT: 172 LBS | OXYGEN SATURATION: 98 % | DIASTOLIC BLOOD PRESSURE: 62 MMHG

## 2019-09-26 DIAGNOSIS — I10 ESSENTIAL HYPERTENSION: ICD-10-CM

## 2019-09-26 DIAGNOSIS — J45.41 MODERATE PERSISTENT ASTHMATIC BRONCHITIS WITH ACUTE EXACERBATION: Primary | ICD-10-CM

## 2019-09-26 PROCEDURE — 99214 OFFICE O/P EST MOD 30 MIN: CPT | Performed by: INTERNAL MEDICINE

## 2019-09-26 PROCEDURE — G0008 ADMIN INFLUENZA VIRUS VAC: HCPCS | Performed by: INTERNAL MEDICINE

## 2019-09-26 PROCEDURE — 90662 IIV NO PRSV INCREASED AG IM: CPT | Performed by: INTERNAL MEDICINE

## 2019-09-26 PROCEDURE — 94060 EVALUATION OF WHEEZING: CPT | Performed by: INTERNAL MEDICINE

## 2019-09-26 RX ORDER — GUAIFENESIN, PSEUDOEPHEDRINE HYDROCHLORIDE 600; 60 MG/1; MG/1
1 TABLET, EXTENDED RELEASE ORAL EVERY 12 HOURS
COMMUNITY
End: 2019-10-20

## 2019-09-26 RX ORDER — BUDESONIDE AND FORMOTEROL FUMARATE DIHYDRATE 80; 4.5 UG/1; UG/1
AEROSOL RESPIRATORY (INHALATION)
Qty: 1 INHALER | Refills: 5 | Status: SHIPPED | OUTPATIENT
Start: 2019-09-26 | End: 2020-10-29 | Stop reason: SDUPTHER

## 2019-09-26 RX ORDER — CETIRIZINE HYDROCHLORIDE 10 MG/1
10 TABLET ORAL DAILY
COMMUNITY

## 2019-09-26 NOTE — ASSESSMENT & PLAN NOTE
BP mildly elevated this AM, could be due to cold meds, coughing, and/or coffee; no meds - follow clinically   Patient has been asleep majority of shift, will wake up to IV beeping or name easily, but falls right back asleep when not stimulated.

## 2019-09-26 NOTE — ASSESSMENT & PLAN NOTE
PFTs as noted - normal; symptomatic with persistent cough after recent sinusitis/bronchitis, therefore empiric resuming of symbicort 80/4.5 2 puffs BID #1, 5RF; advised that we consider this as a seasonal med in the future Aug-March; reviewed how to use meds correctly as well as alternative depending on insurance coverage

## 2019-09-26 NOTE — PROGRESS NOTES
"Chief Complaint   Patient presents with   • Bronchitis       History of Present Illness  64 y.o.  gentleman follow-up regarding persistent cough.  Reports overall improvement but still with cough, now mostly presents for dry.  Continues to take Mucinex DM as needed.  Has not had significant chest tightness but reports coughing spells on occasion, such as when talking a lot.  Denies any shortness of breath or wheezing.  Currently without any fevers, significant nasal congestion or runny nose.  Remains on Flonase, Mucinex, as well as Zyrtec.  Also using nasal saline spray.    Feels that blood pressures a little bit elevated today because he just took Mucinex D this morning as well as having some coffee to drink.  He also had a severe coughing spell this morning.    Review of Systems  ROS (+) for nonproductive cough, minimal chest tightness, no shortness of breath or wheezing.  No fevers or chills.  No night sweats.  All other ROS reviewed and negative.    [facility]  The following portions of the patient's history were reviewed and updated as appropriate: allergies, current medications, past family history, past medical history, past social history, past surgical history and problem list.      Current Outpatient Medications:   •  aspirin 81 MG tablet, QD  •  cetirizine (zyrTEC) 10 MG QD  •  Cholecalciferol (VITAMIN D) 2000 UNITS QD  •  Cinnamon 500 MG tablet, QD  •  Fluticasone Furoate (FLONASE SENSIMIST NA), Ad   •  ibuprofen (ADVIL,MOTRIN) 600 MG tablet,BID prn  •  Multiple Vitamins-Minerals (CENTRUM SILVER ADULT 50+ PO), QD  •  Multiple Vitamins-Minerals (OCUVITE-LUTEIN PO), PreserVision AREDS-2, QD  •  [place]-3 Krill Oil 1000 MG capsule, QD  •  pseudoephedrine-guaifenesin (MUCINEX D)  MG prn  •  Turmeric 500 MG QD    VITALS:  /62   Pulse 76   Ht 172.7 cm (68\")   Wt 78 kg (172 lb)   SpO2 98%   BMI 26.15 kg/m²     Physical Exam   Constitutional: He is oriented to person, place, and time. He " appears well-developed and well-nourished.   HENT:   Mouth/Throat: No oropharyngeal exudate (posterior drainage without exudate/erythema, or swelling).   Eyes: Conjunctivae and EOM are normal.   Wears glasses   Cardiovascular: Normal rate, regular rhythm and normal heart sounds.   Pulmonary/Chest: Effort normal and breath sounds normal. No stridor. No respiratory distress. He has no wheezes. He has no rales.   Abdominal: Soft. Bowel sounds are normal.   Neurological: He is alert and oriented to person, place, and time.   Psychiatric: He has a normal mood and affect. His behavior is normal.   Nursing note and vitals reviewed.      LABS  PFTs: dx - cough, bronchitis asthma  Spirometry is within normal limits.    FEV1 118 %,  %, FEV1/, no post-BDR      ASSESSMENT/PLAN  Problem List Items Addressed This Visit     Hypertension     BP mildly elevated this AM, could be due to cold meds, coughing, and/or coffee; no meds - follow clinically         Asthmatic bronchitis - Primary     PFTs as noted - normal; symptomatic with persistent cough after recent sinusitis/bronchitis, therefore empiric resuming of symbicort 80/4.5 2 puffs BID #1, 5RF; advised that we consider this as a seasonal med in the future Aug-March; reviewed how to use meds correctly as well as alternative depending on insurance coverage           Relevant Medications    cetirizine (zyrTEC) 10 MG tablet    pseudoephedrine-guaifenesin (MUCINEX D)  MG per 12 hr tablet    budesonide-formoterol (SYMBICORT) 80-4.5 MCG/ACT inhaler          FOLLOW-UP  1. Health maintencnce - flu vacc given today  2. RTC for initial AWV 10/22/19; fasting labs prior to appt (CBC, CMP, TSH, lipids, UA/micro, PSA, A1C, microalb)    Electronically signed by:    Gina Adamson MD  09/26/2019    EMR Dragon/Transcription disclaimer:  Much of this encounter note is an electronic transcription/translation of spoken language to printed text. Electronic translation of spoken  language may permit erroneous, or at times, nonsensical words or phrases, to be inadvertently transcribed. Although I have reviewed the note for such errors, some may still exist.

## 2019-10-02 ENCOUNTER — OFFICE VISIT (OUTPATIENT)
Dept: INTERNAL MEDICINE | Facility: CLINIC | Age: 64
End: 2019-10-02

## 2019-10-02 VITALS
OXYGEN SATURATION: 93 % | BODY MASS INDEX: 26.37 KG/M2 | HEART RATE: 70 BPM | HEIGHT: 68 IN | DIASTOLIC BLOOD PRESSURE: 68 MMHG | SYSTOLIC BLOOD PRESSURE: 132 MMHG | WEIGHT: 174 LBS | TEMPERATURE: 98.8 F

## 2019-10-02 DIAGNOSIS — R05.8 UPPER AIRWAY COUGH SYNDROME: ICD-10-CM

## 2019-10-02 DIAGNOSIS — H10.32 ACUTE BACTERIAL CONJUNCTIVITIS OF LEFT EYE: Primary | ICD-10-CM

## 2019-10-02 PROCEDURE — 99213 OFFICE O/P EST LOW 20 MIN: CPT | Performed by: NURSE PRACTITIONER

## 2019-10-02 RX ORDER — GENTAMICIN SULFATE 3 MG/ML
1 SOLUTION/ DROPS OPHTHALMIC EVERY 4 HOURS
Qty: 5 ML | Refills: 0 | Status: SHIPPED | OUTPATIENT
Start: 2019-10-02 | End: 2019-10-12

## 2019-10-02 NOTE — PROGRESS NOTES
Chief Complaint   Patient presents with   • URI   • Conjunctivitis     left        History of Present Illness    Yoseph Granados Jr. is a 64 y.o. male who presents today for post nasal drainage, cough,  and red eye. PND and cough persistent for 1 month, not improving with time. Cough and drainage are worse in the morning upon wakening. Has been using mucinex dm, flonase, and zyrtec daily. Was prescribed antibiotic 2 weeks ago for upper respiratory infection. Denies fevers, chills, headaches, earaches, runny nose, congestion, shortness of breath, or chest pain.  Also endorses itchy, red, watery left eye over the last 2 days. Awoke this morning with matting of left eye lashes and worsening discomfort. Using warm cloth over eye for relief.     PMS[facility]    The following portions of the patient's history were reviewed and updated as appropriate: allergies, current medications, past family history, past medical history, past social history, past surgical history and problem list.     Social History     Tobacco Use   • Smoking status: Never Smoker   • Smokeless tobacco: Never Used   Substance Use Topics   • Alcohol use: Yes     Comment: Maybe once a month       Past Medical History:   Diagnosis Date   • Age-related nuclear cataract of both eyes 4/23/2018   • Amblyopia of eye, left    • Finger amputation, traumatic 1991    h/o L.index finger amp seconary to bleacher injury, s/p failed surgery   • Hx of chest x-ray 02/28/2018    CXR (2/28/18): lenticular opacities at bases sugg of atelectasis   • Hx of chest x-ray 09/16/2019    CXR (9/16/19): chronic changes of atelectasia dn scarring at right hilar and infrahilar region as well as prior healed   • Hx of colonoscopy 06/04/2012    colonosc (6/4/12): polyp, diverticulosis, int hem; GI - Dr. George   • Hx of colonoscopy 07/24/2017    colonosc (7/24/17): polyp; GI - Dr. George   • Hx of exercise stress test 02/11/2015    nl GXT (2/11/15)   • Trochanteric bursitis of right hip  02/08/2017    s/p injection (2/8/17), resolved; ortho - Dr. Trevino       Past Surgical History:   Procedure Laterality Date   • AMPUTATION DIGIT Left 1991     History of Amputated Index Finger DIP Joint(s) secondary to injury at bleachers; s/p failed surgery x2    • BUNIONECTOMY  10/2015    s/p [place] bunionectomy, 2nd metatarsal osteotomy shortening, arthrodesis (10/15); podiatry - Dr. Davidson   • COLONOSCOPY  2017    You already have this information   • KNEE SURGERY Left 1985   • METATARSAL OSTEOTOMY      history of buniuon correction with metatarsal osteotomy  s/p [place] bunionectomty, 2nd metatarsal osteotomy shortening, arthrodesis (10/15);  podiatry - Dr. Davidson       Allergies   Allergen Reactions   • Cosamin Ds [Glucosamine-Chondroitin] Hives   • Erythromycin Unknown (See Comments)   • Naproxen    • Sulfa Antibiotics Hives   • Sulfamethoxazole Unknown (See Comments)   • Trimethoprim Unknown (See Comments)         Current Outpatient Medications:   •  aspirin 81 MG tablet, Take 1 tablet by mouth Daily., Disp: , Rfl:   •  budesonide-formoterol (SYMBICORT) 80-4.5 MCG/ACT inhaler, 2 puffs BID, gargle and spit after puffs, Disp: 1 inhaler, Rfl: 5  •  cetirizine (zyrTEC) 10 MG tablet, Take 10 mg by mouth Daily., Disp: , Rfl:   •  Cholecalciferol (VITAMIN D) 2000 UNITS capsule, Take 1 capsule by mouth daily., Disp: , Rfl:   •  Cinnamon 500 MG tablet, Take 1 tablet by mouth daily., Disp: , Rfl:   •  Fluticasone Furoate (FLONASE SENSIMIST NA), , Disp: , Rfl:   •  ibuprofen (ADVIL,MOTRIN) 600 MG tablet, Take 1 tablet by mouth 2 (two) times a day as needed., Disp: , Rfl:   •  Multiple Vitamins-Minerals (CENTRUM SILVER ADULT 50+ PO), Take 1 tablet by mouth daily., Disp: , Rfl:   •  Multiple Vitamins-Minerals (OCUVITE-LUTEIN PO), PreserVision AREDS-2, Disp: , Rfl:   •  Omega-3 Krill Oil 1000 MG capsule, Take 1 tablet by mouth daily., Disp: , Rfl:   •  pseudoephedrine-guaifenesin (MUCINEX D)  MG per 12 hr tablet, Take 1  "tablet by mouth Every 12 (Twelve) Hours., Disp: , Rfl:   •  Turmeric 500 MG capsule, Take 500 mg by mouth Daily., Disp: , Rfl:   •  gentamicin (GARAMYCIN) 0.3 % ophthalmic solution, Administer 1 drop into the left eye Every 4 (Four) Hours for 10 days., Disp: 5 mL, Rfl: 0    Review of Systems  Review of Systems   Constitutional: Negative for appetite change, chills, diaphoresis, fatigue and fever.   HENT: Positive for postnasal drip. Negative for congestion, ear pain, rhinorrhea, sinus pressure, sinus pain, sneezing and sore throat.    Eyes: Positive for discharge, redness and itching. Negative for visual disturbance.   Respiratory: Positive for cough. Negative for shortness of breath.    Cardiovascular: Negative for chest pain and palpitations.   Gastrointestinal: Negative for diarrhea, nausea and vomiting.   Neurological: Negative for headaches.   Psychiatric/Behavioral: Negative for sleep disturbance.       Vitals:  Vitals:    10/02/19 0959   BP: 132/68   Pulse: 70   Temp: 98.8 °F (37.1 °C)   TempSrc: Oral   SpO2: 93%   Weight: 78.9 kg (174 lb)   Height: 172.7 cm (67.99\")       Physical Exam  Physical Exam   Constitutional: He is oriented to person, place, and time. Vital signs are normal. He appears well-developed and well-nourished.   HENT:   Head: Normocephalic and atraumatic.   Right Ear: Tympanic membrane, external ear and ear canal normal.   Left Ear: Tympanic membrane, external ear and ear canal normal.   Nose: Mucosal edema present. No rhinorrhea, sinus tenderness or congestion. Right sinus exhibits no maxillary sinus tenderness and no frontal sinus tenderness. Left sinus exhibits no maxillary sinus tenderness and no frontal sinus tenderness.   Mouth/Throat: Uvula is midline, oropharynx is clear and moist and mucous membranes are normal.   Eyes: Lids are normal. Pupils are equal, round, and reactive to light. Right eye exhibits no discharge and no exudate. Left eye exhibits discharge and exudate. Left " conjunctiva is injected.   Cardiovascular: Normal rate, regular rhythm and normal heart sounds.   Pulmonary/Chest: Effort normal and breath sounds normal. No respiratory distress. He has no decreased breath sounds. He has no wheezes.   Lymphadenopathy:        Head (right side): No submental, no submandibular, no tonsillar, no preauricular, no posterior auricular and no occipital adenopathy present.        Head (left side): No submental, no submandibular, no tonsillar, no preauricular, no posterior auricular and no occipital adenopathy present.     He has no cervical adenopathy.   Neurological: He is alert and oriented to person, place, and time.   Psychiatric: He has a normal mood and affect. His behavior is normal.   Nursing note and vitals reviewed.      Labs  None this visit    Assessment/Plan  Yoseph was seen today for uri and conjunctivitis.    Diagnoses and all orders for this visit:    Acute bacterial conjunctivitis of left eye  -     gentamicin (GARAMYCIN) 0.3 % ophthalmic solution; Administer 1 drop into the left eye Every 4 (Four) Hours for 10 days.  Patient advised in bilateral lid scrubbing with mild soap and water twice daily, and warm compresses 3 times daily. Advised in hygiene measures to prevent spread of infection to others including but not limited to routine changing of linens and avoidance of touching of eyes. Advised in cool compresses as tolerated for inflammation and continued use of antihistamines for relief of itching.  Patient advised to follow-up with ophthalmology for new, worsening, or persistent symptoms or for changes in vision.    Upper airway cough syndrome  Patient advised in continued use of antihistamine for symptoms relief. Continue to stay hydrated and avoid known allergic triggers. Follow-up with PCP for new, worsening, or persistent symptoms.    Plan of care reviewed with patient at conclusion of today's visit. Patient education was provided regarding diagnosis, management, and  prescribed or recommended OTC medications. Patient verbalized understanding and agreement with plan of care.     Follow-Up  Return if symptoms worsen or fail to improve.    Electronically Signed By:  ANAID Rodriguez

## 2019-10-02 NOTE — PATIENT INSTRUCTIONS
Bacterial Conjunctivitis    Bacterial conjunctivitis is an infection of the clear membrane that covers the white part of your eye and the inner surface of your eyelid (conjunctiva). When the blood vessels in your conjunctiva become inflamed, your eye becomes red or pink, and it will probably feel itchy. Bacterial conjunctivitis spreads very easily from person to person (is contagious). It also spreads easily from one eye to the other eye.  What are the causes?  This condition is caused by several common bacteria. You may get the infection if you come into close contact with another person who is infected. You may also come into contact with items that are contaminated with the bacteria, such as a face towel, contact lens solution, or eye makeup.  What increases the risk?  This condition is more likely to develop in people who:  · Are exposed to other people who have the infection.  · Wear contact lenses.  · Have a sinus infection.  · Have had a recent eye injury or surgery.  · Have a weak body defense system (immune system).  · Have a medical condition that causes dry eyes.  What are the signs or symptoms?  Symptoms of this condition include:  · Eye redness.  · Tearing or watery eyes.  · Itchy eyes.  · Burning feeling in your eyes.  · Thick, yellowish discharge from an eye. This may turn into a crust on the eyelid overnight and cause your eyelids to stick together.  · Swollen eyelids.  · Blurred vision.  How is this diagnosed?  Your health care provider can diagnose this condition based on your symptoms and medical history. Your health care provider may also take a sample of discharge from your eye to find the cause of your infection. This is rarely done.  How is this treated?  Treatment for this condition includes:  · Antibiotic eye drops or ointment to clear the infection more quickly and prevent the spread of infection to others.  · Oral antibiotic medicines to treat infections that do not respond to drops or  ointments, or last longer than 10 days.  · Cool, wet cloths (cool compresses) placed on the eyes.  · Artificial tears applied 2-6 times a day.  Follow these instructions at home:  Medicines  · Take or apply your antibiotic medicine as told by your health care provider. Do not stop taking or applying the antibiotic even if you start to feel better.  · Take or apply over-the-counter and prescription medicines only as told by your health care provider.  · Be very careful to avoid touching the edge of your eyelid with the eye drop bottle or the ointment tube when you apply medicines to the affected eye. This will keep you from spreading the infection to your other eye or to other people.  Managing discomfort  · Gently wipe away any drainage from your eye with a warm, wet washcloth or a cotton ball.  · Apply a cool, clean washcloth to your eye for 10-20 minutes, 3-4 times a day.  General instructions  · Do not wear contact lenses until the inflammation is gone and your health care provider says it is safe to wear them again. Ask your health care provider how to sterilize or replace your contact lenses before you use them again. Wear glasses until you can resume wearing contacts.  · Avoid wearing eye makeup until the inflammation is gone. Throw away any old eye cosmetics that may be contaminated.  · Change or wash your pillowcase every day.  · Do not share towels or washcloths. This may spread the infection.  · Wash your hands often with soap and water. Use paper towels to dry your hands.  · Avoid touching or rubbing your eyes.  · Do not drive or use heavy machinery if your vision is blurred.  Contact a health care provider if:  · You have a fever.  · Your symptoms do not get better after 10 days.  Get help right away if:  · You have a fever and your symptoms suddenly get worse.  · You have severe pain when you move your eye.  · You have facial pain, redness, or swelling.  · You have sudden loss of vision.  This  information is not intended to replace advice given to you by your health care provider. Make sure you discuss any questions you have with your health care provider.  Document Released: 12/18/2006 Document Revised: 04/27/2017 Document Reviewed: 09/29/2016  [employer] Interactive Patient Education © 2019 [employer]

## 2019-10-10 ENCOUNTER — PATIENT MESSAGE (OUTPATIENT)
Dept: INTERNAL MEDICINE | Facility: CLINIC | Age: 64
End: 2019-10-10

## 2019-10-16 ENCOUNTER — LAB (OUTPATIENT)
Dept: INTERNAL MEDICINE | Facility: CLINIC | Age: 64
End: 2019-10-16

## 2019-10-16 DIAGNOSIS — I80.02 SUPERFICIAL THROMBOPHLEBITIS OF LEFT LEG: ICD-10-CM

## 2019-10-16 DIAGNOSIS — E87.0 HYPERNATREMIA: ICD-10-CM

## 2019-10-16 DIAGNOSIS — I45.10 BUNDLE BRANCH BLOCK, RIGHT: ICD-10-CM

## 2019-10-16 DIAGNOSIS — M19.041 PRIMARY OSTEOARTHRITIS OF BOTH HANDS: ICD-10-CM

## 2019-10-16 DIAGNOSIS — N28.9 RENAL INSUFFICIENCY: ICD-10-CM

## 2019-10-16 DIAGNOSIS — I10 ESSENTIAL HYPERTENSION: Primary | ICD-10-CM

## 2019-10-16 DIAGNOSIS — M19.042 PRIMARY OSTEOARTHRITIS OF BOTH HANDS: ICD-10-CM

## 2019-10-16 DIAGNOSIS — Z12.5 SCREENING PSA (PROSTATE SPECIFIC ANTIGEN): ICD-10-CM

## 2019-10-16 DIAGNOSIS — R73.01 IMPAIRED FASTING GLUCOSE: ICD-10-CM

## 2019-10-17 PROBLEM — S92.501A CLOSED FRACTURE OF PHALANX OF LESSER TOE OF RIGHT FOOT: Status: RESOLVED | Noted: 2017-07-24 | Resolved: 2019-10-17

## 2019-10-17 PROBLEM — R97.20 INCREASED PROSTATE SPECIFIC ANTIGEN (PSA) VELOCITY: Status: ACTIVE | Noted: 2019-10-17

## 2019-10-17 PROBLEM — N28.9 RENAL INSUFFICIENCY: Status: RESOLVED | Noted: 2017-07-28 | Resolved: 2019-10-17

## 2019-10-17 LAB
ALBUMIN SERPL-MCNC: 4.7 G/DL (ref 3.5–5.2)
ALBUMIN/CREAT UR: 14.3 MG/G CREAT (ref 0–30)
ALBUMIN/GLOB SERPL: 2.8 G/DL
ALP SERPL-CCNC: 77 U/L (ref 39–117)
ALT SERPL-CCNC: 17 U/L (ref 1–41)
APPEARANCE UR: CLEAR
AST SERPL-CCNC: 18 U/L (ref 1–40)
BACTERIA #/AREA URNS HPF: NORMAL /HPF
BASOPHILS # BLD AUTO: 0.04 10*3/MM3 (ref 0–0.2)
BASOPHILS NFR BLD AUTO: 0.5 % (ref 0–1.5)
BILIRUB SERPL-MCNC: 0.4 MG/DL (ref 0.2–1.2)
BILIRUB UR QL STRIP: NEGATIVE
BUN SERPL-MCNC: 16 MG/DL (ref 8–23)
BUN/CREAT SERPL: 16.5 (ref 7–25)
CALCIUM SERPL-MCNC: 9.3 MG/DL (ref 8.6–10.5)
CASTS URNS MICRO: NORMAL
CHLORIDE SERPL-SCNC: 102 MMOL/L (ref 98–107)
CHOLEST SERPL-MCNC: 184 MG/DL (ref 0–200)
CO2 SERPL-SCNC: 27.4 MMOL/L (ref 22–29)
COLOR UR: YELLOW
CREAT SERPL-MCNC: 0.97 MG/DL (ref 0.76–1.27)
CREAT UR-MCNC: 50.5 MG/DL
EOSINOPHIL # BLD AUTO: 0.17 10*3/MM3 (ref 0–0.4)
EOSINOPHIL NFR BLD AUTO: 2.3 % (ref 0.3–6.2)
EPI CELLS #/AREA URNS HPF: NORMAL /HPF
ERYTHROCYTE [DISTWIDTH] IN BLOOD BY AUTOMATED COUNT: 12.6 % (ref 12.3–15.4)
GLOBULIN SER CALC-MCNC: 1.7 GM/DL
GLUCOSE SERPL-MCNC: 95 MG/DL (ref 65–99)
GLUCOSE UR QL: NEGATIVE
HBA1C MFR BLD: 5.6 % (ref 4.8–5.6)
HCT VFR BLD AUTO: 46.6 % (ref 37.5–51)
HDLC SERPL-MCNC: 46 MG/DL (ref 40–60)
HGB BLD-MCNC: 15.5 G/DL (ref 13–17.7)
HGB UR QL STRIP: NEGATIVE
IMM GRANULOCYTES # BLD AUTO: 0.02 10*3/MM3 (ref 0–0.05)
IMM GRANULOCYTES NFR BLD AUTO: 0.3 % (ref 0–0.5)
KETONES UR QL STRIP: NEGATIVE
LDLC SERPL CALC-MCNC: 104 MG/DL (ref 0–100)
LEUKOCYTE ESTERASE UR QL STRIP: NEGATIVE
LYMPHOCYTES # BLD AUTO: 1.4 10*3/MM3 (ref 0.7–3.1)
LYMPHOCYTES NFR BLD AUTO: 18.8 % (ref 19.6–45.3)
MCH RBC QN AUTO: 30.8 PG (ref 26.6–33)
MCHC RBC AUTO-ENTMCNC: 33.3 G/DL (ref 31.5–35.7)
MCV RBC AUTO: 92.5 FL (ref 79–97)
MICROALBUMIN UR-MCNC: 7.2 UG/ML
MONOCYTES # BLD AUTO: 0.67 10*3/MM3 (ref 0.1–0.9)
MONOCYTES NFR BLD AUTO: 9 % (ref 5–12)
NEUTROPHILS # BLD AUTO: 5.16 10*3/MM3 (ref 1.7–7)
NEUTROPHILS NFR BLD AUTO: 69.1 % (ref 42.7–76)
NITRITE UR QL STRIP: NEGATIVE
NRBC BLD AUTO-RTO: 0 /100 WBC (ref 0–0.2)
PH UR STRIP: 6.5 [PH] (ref 5–8)
PLATELET # BLD AUTO: 192 10*3/MM3 (ref 140–450)
POTASSIUM SERPL-SCNC: 4.5 MMOL/L (ref 3.5–5.2)
PROT SERPL-MCNC: 6.4 G/DL (ref 6–8.5)
PROT UR QL STRIP: NEGATIVE
PSA SERPL-MCNC: 1.41 NG/ML (ref 0–4)
RBC # BLD AUTO: 5.04 10*6/MM3 (ref 4.14–5.8)
RBC #/AREA URNS HPF: NORMAL /HPF
SODIUM SERPL-SCNC: 142 MMOL/L (ref 136–145)
SP GR UR: 1.01 (ref 1–1.03)
TRIGL SERPL-MCNC: 172 MG/DL (ref 0–150)
TSH SERPL DL<=0.005 MIU/L-ACNC: 2.43 UIU/ML (ref 0.27–4.2)
UROBILINOGEN UR STRIP-MCNC: (no result) MG/DL
VLDLC SERPL CALC-MCNC: 34.4 MG/DL
WBC # BLD AUTO: 7.46 10*3/MM3 (ref 3.4–10.8)
WBC #/AREA URNS HPF: NORMAL /HPF

## 2019-10-20 PROBLEM — Z00.00 PE (PHYSICAL EXAM), ROUTINE: Status: RESOLVED | Noted: 2017-01-31 | Resolved: 2019-10-20

## 2019-10-20 PROBLEM — M25.551 RIGHT HIP PAIN: Status: RESOLVED | Noted: 2017-01-31 | Resolved: 2019-10-20

## 2019-10-20 NOTE — ASSESSMENT & PLAN NOTE
Bord increased PSA velocity as the interval is > 1-1/2 yrs between the 2 values; repeat PSA (free/total) in 6 mos for cont'd surveillance; counseling re: implications of PSA and prostate CA screening options

## 2019-10-20 NOTE — ASSESSMENT & PLAN NOTE
Overall improved; noww off of mucinex DM; stable on symbicort 80/4.5 2 puffs BID, gargle/spit afterwards - will continue it through this winter season; PFTs already done 9/26/19

## 2019-10-20 NOTE — ASSESSMENT & PLAN NOTE
BP stable on no meds; rec low Na diet, increased aerobic exercise; home BP monitoring with goal BP < 130/80; f/u in 6 mos

## 2019-10-20 NOTE — ASSESSMENT & PLAN NOTE
Health maintenance - flu vacc 9/19; Prevnar 8/18; PVX today; Tdap 9/13 (Td due 2023), Zostavax 12/16; HAV and Shingrix done; colonosc 7/17, repeat 2022 with Dr. George; PSA stable; eye exam 7/19 with Dr. Aragon; dental exam pending 11/19; (+) seat belt use    Consultants:  Patient Care Team:  Gina Adamson MD as PCP - General  [facility], Franny Alegria MD as Consulting Physician (Dermatology)  Erwin George MD as Consulting Physician (Gastroenterology)  Yoseph Aragon MD as Consulting Physician (Ophthalmology)  Paramjit Trevino MD as Consulting Physician (Orthopedic Surgery)  Remington Ellre DPM as Consulting Physician (Podiatry)

## 2019-10-22 ENCOUNTER — OFFICE VISIT (OUTPATIENT)
Dept: INTERNAL MEDICINE | Facility: CLINIC | Age: 64
End: 2019-10-22

## 2019-10-22 VITALS
HEIGHT: 69 IN | WEIGHT: 174 LBS | DIASTOLIC BLOOD PRESSURE: 70 MMHG | SYSTOLIC BLOOD PRESSURE: 128 MMHG | BODY MASS INDEX: 25.77 KG/M2 | OXYGEN SATURATION: 98 % | HEART RATE: 77 BPM

## 2019-10-22 DIAGNOSIS — R73.01 IMPAIRED FASTING GLUCOSE: ICD-10-CM

## 2019-10-22 DIAGNOSIS — R97.20 INCREASED PROSTATE SPECIFIC ANTIGEN (PSA) VELOCITY: ICD-10-CM

## 2019-10-22 DIAGNOSIS — J45.41 MODERATE PERSISTENT ASTHMATIC BRONCHITIS WITH ACUTE EXACERBATION: ICD-10-CM

## 2019-10-22 DIAGNOSIS — E78.5 DYSLIPIDEMIA: ICD-10-CM

## 2019-10-22 DIAGNOSIS — Z00.00 MEDICARE ANNUAL WELLNESS VISIT, INITIAL: Primary | ICD-10-CM

## 2019-10-22 DIAGNOSIS — I10 ESSENTIAL HYPERTENSION: ICD-10-CM

## 2019-10-22 PROCEDURE — 99397 PER PM REEVAL EST PAT 65+ YR: CPT | Performed by: INTERNAL MEDICINE

## 2019-10-22 PROCEDURE — 90732 PPSV23 VACC 2 YRS+ SUBQ/IM: CPT | Performed by: INTERNAL MEDICINE

## 2019-10-22 PROCEDURE — 96160 PT-FOCUSED HLTH RISK ASSMT: CPT | Performed by: INTERNAL MEDICINE

## 2019-10-22 PROCEDURE — G0438 PPPS, INITIAL VISIT: HCPCS | Performed by: INTERNAL MEDICINE

## 2019-10-22 PROCEDURE — 93000 ELECTROCARDIOGRAM COMPLETE: CPT | Performed by: INTERNAL MEDICINE

## 2019-10-22 PROCEDURE — G0009 ADMIN PNEUMOCOCCAL VACCINE: HCPCS | Performed by: INTERNAL MEDICINE

## 2019-10-22 RX ORDER — BUDESONIDE AND FORMOTEROL FUMARATE DIHYDRATE 160; 4.5 UG/1; UG/1
2 AEROSOL RESPIRATORY (INHALATION)
Qty: 1 INHALER | Refills: 5 | Status: CANCELLED | OUTPATIENT
Start: 2019-10-22

## 2019-10-22 RX ORDER — CARBOXYMETHYLCELLULOSE SODIUM 5 MG/ML
SOLUTION/ DROPS OPHTHALMIC 3 TIMES DAILY PRN
COMMUNITY

## 2019-10-22 NOTE — PROGRESS NOTES
ANNUAL WELLNESS VISIT    DRUG AND ALCOHOL USE      no tobacco use, alcohol intake:1 liquor drinks per week and caffeine intake: 2 cups of caffeinated coffee per day    DIET AND PHYSICAL ACTIVITY     Diet: general    Exercise: infrequently   Exercise Details: walking; has not utilized his recumbent bike since around Labor Day    MOOD DISORDER AND COGNITIVE SCREENING   Depression Screening Tool Used yes - see PHQ-9; components reviewed with patient; 15-min counseling done -denies feeling depressed or hopeless, having little pleasure in doing things, energy concerns appetite concerns or having any sleep issues.  Remains sickly active.  Plans to resume his exercise.  Was trying to lift some concrete blocks yesterday.  Spends time with family and friends.   Anxiety Screening Tool Used yes     Mini-Cog Performed   Yes    1. Tell Patient 3 Words apple table dinesh    2. Administer Clock Test normal    3. Recall 3 words  apple table dinesh    4. Number Correct Items 3    FUNCTIONAL ABILITY AND LEVEL OF SAFETY   Hearing no hearing loss     Wears Hearing Aids No       Current Activities Independent      none  - see Funct/Cog Status Intake     Fall Risk Assessment       Has difficulty with walking or balance  No         Timed Up and Go (TUG) Test  8 sec.       If >12 sec, normal    ADVANCED DIRECTIVE Patient does not have an advance directive - not interested in additional information    PAIN SCREENING Do you have pain right now? no      If so, 1-10 scale: 0          Do you have pain every day? No      Probable chronic pain: No     Recent Hospitalizations:  No recent hospitalization(s)..     MEDICATION REVIEW   - updated and reviewed (see Medication List).   - reviewed for potentially harmful drug-disease interactions in the elderly.   - reviewed for high risk medications in the elderly.   - aspirin use: Yes    BMI  Body mass index is 25.88 kg/m².    Patient's Body mass index is 25.88 kg/m². BMI is within normal parameters. No  follow-up required..    _________________________________________________________    Chief Complaint   Patient presents with   • Medicare Wellness-Initial Visit   • Hypertension       History of Present Illness  64 y.o.  gentleman presents for initial wellness visit as well as updated phys examination.  Reports resolution of cough after discontinuation of Mucinex.  Still has a little bit of cough with excessive talking otherwise feels good about continuation of Symbicort.  Denies any shortness of breath or difficulty in breathing.  Denies any chest tightness.    Has not been exercising on his recumbent bike since around Labor Day due to illness and other issues.  Plans to resume this.    Reports recurrent pinkeye recently, likely contracted from granddaughter.  Mostly asymptomatic at this time.    Review of Systems  Denies headaches, CP, palpitations, SOB, abd pain, n/v/d, difficulty with urination, numbness/tingling, falls, mood changes, lightheadedness, hearing changes, rashes.    ROS (+) for recent h/o pink eye.  ROS (+) for improved cough without wheezing, chest tightness or difficulty in breathing.     Denies urinary sxs, including no hesitancy, dribbling, urinary urgency or frequency.   All other ROS reviewed and negative.    [facility]  The following portions of the patient's history were reviewed and updated as appropriate: allergies, current medications, past family history, past medical history, past social history, past surgical history and problem list.      Current Outpatient Medications:   •  aspirin 81 MG QD  •  budesonide-formoterol (SYMBICORT) 80-4.5 MCG/ACT inhaler, 2 puffs BID  •  carboxymethylcellulose (REFRESH PLUS) 0.5 % solution, TID prn  •  cetirizine (zyrTEC) 10 MG tablet, QD  •  Cholecalciferol (VITAMIN D) 2000 UNITS capsule, QD   •  Cinnamon 500 MG tablet, QD  •  Fluticasone Furoate (FLONASE SENSIMIST NA),AD  •  ibuprofen (ADVIL,MOTRIN) 600 MG tablet, prn  •  Multiple Vitamins-Minerals  "(CENTRUM SILVER ADULT 50+ PO), QD  •  Multiple Vitamins-Minerals (OCUVITE-LUTEIN PO), PreserVision AREDS-2, QD  •  Goodrich-3 Krill Oil 1000 MG capsule, QD  •  Probiotic Product (PROBIOTIC-10 PO), QD  •  Turmeric 500 MG capsule, TQD      VITALS:  /70   Pulse 77   Ht 174.6 cm (68.75\")   Wt 78.9 kg (174 lb)   SpO2 98%   BMI 25.88 kg/m²     Physical Exam   Constitutional: He is oriented to person, place, and time. He appears well-developed and well-nourished.   HENT:   Head: Normocephalic.   Right Ear: External ear normal.   Left Ear: External ear normal.   Nose: Nose normal.   Mouth/Throat: Oropharynx is clear and moist and mucous membranes are normal. No oropharyngeal exudate.   Eyes: Conjunctivae and EOM are normal. Pupils are equal, round, and reactive to light.   Wears glasses; medial deviation left eye - baseline   Neck: Normal range of motion. Neck supple. Carotid bruit is not present (bilaterally). No thyromegaly present.   Cardiovascular: Normal rate, regular rhythm and normal heart sounds.   Pulmonary/Chest: Effort normal and breath sounds normal. No respiratory distress. He has no wheezes. He has no rales.   Abdominal: Soft. Bowel sounds are normal. He exhibits no distension and no mass. There is no hepatosplenomegaly. There is no tenderness.   Genitourinary:   Genitourinary Comments: Chaperone declined by patient; normal external genitalia, no hemorrhoids, prostate smooth, not enlarged, nontender, no nodules palpated     Musculoskeletal: Normal range of motion. He exhibits no edema.   Lymphadenopathy:     He has no cervical adenopathy.   Neurological: He is alert and oriented to person, place, and time. He displays normal reflexes. No cranial nerve deficit.   Skin: Skin is warm and dry. No rash noted.   Psychiatric: He has a normal mood and affect. His behavior is normal.   Nursing note and vitals reviewed.      LABS  Results for orders placed or performed in visit on 10/16/19   TSH   Result Value " Ref Range    TSH 2.430 0.270 - 4.200 uIU/mL   Lipid panel   Result Value Ref Range    Total Cholesterol 184 0 - 200 mg/dL    Triglycerides 172 (H) 0 - 150 mg/dL    HDL Cholesterol 46 40 - 60 mg/dL    VLDL Cholesterol 34.4 mg/dL    LDL Cholesterol  104 (H) 0 - 100 mg/dL   PSA SCREENING   Result Value Ref Range    PSA 1.410 0.000 - 4.000 ng/mL   Hemoglobin A1c   Result Value Ref Range    Hemoglobin A1C 5.60 4.80 - 5.60 %   Microalbumin / Creatinine Urine Ratio -   Result Value Ref Range    Creatinine, Urine 50.5 Not Estab. mg/dL    Microalbumin, Urine 7.2 Not Estab. ug/mL    Microalbumin/Creatinine Ratio 14.3 0.0 - 30.0 mg/g creat   Comprehensive metabolic panel   Result Value Ref Range    Glucose 95 65 - 99 mg/dL    BUN 16 8 - 23 mg/dL    Creatinine 0.97 0.76 - 1.27 mg/dL    eGFR Non African Am 77 >60 mL/min/1.73    eGFR African Am 94 >60 mL/min/1.73    BUN/Creatinine Ratio 16.5 7.0 - 25.0    Sodium 142 136 - 145 mmol/L    Potassium 4.5 3.5 - 5.2 mmol/L    Chloride 102 98 - 107 mmol/L    Total CO2 27.4 22.0 - 29.0 mmol/L    Calcium 9.3 8.6 - 10.5 mg/dL    Total Protein 6.4 6.0 - 8.5 g/dL    Albumin 4.70 3.50 - 5.20 g/dL    Globulin 1.7 gm/dL    A/G Ratio 2.8 g/dL    Total Bilirubin 0.4 0.2 - 1.2 mg/dL    Alkaline Phosphatase 77 39 - 117 U/L    AST (SGOT) 18 1 - 40 U/L    ALT (SGPT) 17 1 - 41 U/L   Microscopic Examination -   Result Value Ref Range    WBC, UA 0-2 /HPF    RBC, UA 0-2 /HPF    Epithelial Cells (non renal) 0-2 /HPF    Cast Type Comment     Bacteria, UA Comment None Seen /HPF   CBC w MANUAL Differential   Result Value Ref Range    WBC 7.46 3.40 - 10.80 10*3/mm3    RBC 5.04 4.14 - 5.80 10*6/mm3    Hemoglobin 15.5 13.0 - 17.7 g/dL    Hematocrit 46.6 37.5 - 51.0 %    MCV 92.5 79.0 - 97.0 fL    MCH 30.8 26.6 - 33.0 pg    MCHC 33.3 31.5 - 35.7 g/dL    RDW 12.6 12.3 - 15.4 %    Platelets 192 140 - 450 10*3/mm3    Neutrophil Rel % 69.1 42.7 - 76.0 %    Lymphocyte Rel % 18.8 (L) 19.6 - 45.3 %    Monocyte Rel % 9.0  5.0 - 12.0 %    Eosinophil Rel % 2.3 0.3 - 6.2 %    Basophil Rel % 0.5 0.0 - 1.5 %    Neutrophils Absolute 5.16 1.70 - 7.00 10*3/mm3    Lymphocytes Absolute 1.40 0.70 - 3.10 10*3/mm3    Monocytes Absolute 0.67 0.10 - 0.90 10*3/mm3    Eosinophils Absolute 0.17 0.00 - 0.40 10*3/mm3    Basophils Absolute 0.04 0.00 - 0.20 10*3/mm3    Immature Granulocyte Rel % 0.3 0.0 - 0.5 %    Immature Grans Absolute 0.02 0.00 - 0.05 10*3/mm3    nRBC 0.0 0.0 - 0.2 /100 WBC   Urinalysis With Microscopic -   Result Value Ref Range    Specific Gravity, UA 1.008 1.005 - 1.030    pH, UA 6.5 5.0 - 8.0    Color, UA Yellow     Appearance, UA Clear Clear    Leukocytes, UA Negative Negative    Protein Negative Negative    Glucose, UA Negative Negative    Ketones Negative Negative    Blood, UA Negative Negative    Bilirubin, UA Negative Negative    Urobilinogen, UA Comment     Nitrite, UA Negative Negative     1/18 PSA 0.45      ECG 12 Lead  Date/Time: 10/22/2019 2:32 PM  Performed by: Gina Adamson MD  Authorized by: Gina Adamson MD   Comparison: compared with previous ECG from 10/2/2018  Similar to previous ECG  Rhythm: sinus rhythm  Ectopy comments: occasional PVCs  Rate: normal  BPM: 84  Conduction: right bundle branch block  ST Segments: ST segments normal  T Waves: T waves normal  QRS axis: normal  Other findings: non-specific ST-T wave changes  Clinical impression comment: stable EKG            ASSESSMENT/PLAN  Problem List Items Addressed This Visit     Medicare annual wellness visit, initial - Primary     Health maintenance - flu vacc 9/19; Prevnar 8/18; PVX today; Tdap 9/13 (Td due 2023), Zostavax 12/16; HAV and Shingrix done; colonosc 7/17, repeat 2022 with Dr. George; PSA stable; eye exam 7/19 with Dr. Aragon; dental exam pending 11/19; (+) seat belt use    Consultants:  Patient Care Team:  Gina Adamson MD as PCP - [facility], Franny Alegria MD as Consulting Physician (Dermatology)  Erwin George MD as  Consulting Physician (Gastroenterology)  Yoseph Aragon MD as Consulting Physician (Ophthalmology)  Paramjit Trevino MD as Consulting Physician (Orthopedic Surgery)  Remington Eller DPM as Consulting Physician (Podiatry)               Increased prostate specific antigen (PSA) velocity     Bord increased PSA velocity as the interval is > 1-1/2 yrs between the 2 values; repeat PSA (free/total) in 6 mos for cont'd surveillance; counseling re: implications of PSA and prostate CA screening options         Impaired fasting glucose     BG control stable with A1C 5.6; encouraged reg phys activity to decr insulin resistance, moderation in unhealthy starches/sweets; f/u A1C in 6 mos           Hypertension     BP stable on no meds; rec low Na diet, increased aerobic exercise; home BP monitoring with goal BP < 130/80; f/u in 6 mos         Relevant Orders    ECG 12 Lead    Dyslipidemia     Stable bord lipids with bord  but LDL at goal 104; no meds         Asthmatic bronchitis     Overall improved; noww off of mucinex DM; stable on symbicort 80/4.5 2 puffs BID, gargle/spit afterwards - will continue it through this winter season; PFTs already done 9/26/19                 FOLLOW-UP  RTC 6 mos with A1C, free/total PSA (escribed)    Electronically signed by:    Gina Adamson MD  10/22/2019

## 2019-12-22 ENCOUNTER — OFFICE VISIT (OUTPATIENT)
Dept: INTERNAL MEDICINE | Facility: CLINIC | Age: 64
End: 2019-12-22

## 2019-12-22 VITALS
WEIGHT: 175 LBS | TEMPERATURE: 98.4 F | HEART RATE: 71 BPM | OXYGEN SATURATION: 92 % | HEIGHT: 69 IN | DIASTOLIC BLOOD PRESSURE: 70 MMHG | BODY MASS INDEX: 25.92 KG/M2 | SYSTOLIC BLOOD PRESSURE: 134 MMHG

## 2019-12-22 DIAGNOSIS — J06.9 VIRAL UPPER RESPIRATORY TRACT INFECTION: Primary | ICD-10-CM

## 2019-12-22 PROCEDURE — 99213 OFFICE O/P EST LOW 20 MIN: CPT | Performed by: NURSE PRACTITIONER

## 2019-12-22 RX ORDER — GUAIFENESIN AND PSEUDOEPHEDRINE HCL 1200; 120 MG/1; MG/1
1 TABLET, EXTENDED RELEASE ORAL EVERY 12 HOURS
Qty: 14 EACH | Refills: 0 | Status: SHIPPED | OUTPATIENT
Start: 2019-12-22 | End: 2019-12-29

## 2019-12-22 RX ORDER — DEXTROMETHORPHAN HYDROBROMIDE AND PROMETHAZINE HYDROCHLORIDE 15; 6.25 MG/5ML; MG/5ML
5 SYRUP ORAL 4 TIMES DAILY PRN
Qty: 240 ML | Refills: 0 | Status: SHIPPED | OUTPATIENT
Start: 2019-12-22 | End: 2019-12-29

## 2019-12-22 NOTE — PROGRESS NOTES
Chief Complaint   Patient presents with   • Sinus Problem       History of Present Illness    Yoseph Granados Jr. is a 64 y.o. male who presents today for sinus pressure.    Sinus Problem   This is a new problem. Episode onset: 1 week ago. The problem has been gradually worsening since onset. There has been no fever. Associated symptoms include congestion, coughing, headaches and sinus pressure. Pertinent negatives include no chills, diaphoresis, ear pain, shortness of breath or sore throat. Past treatments include saline sprays (sudafed, Mucinex). The treatment provided mild relief.       [facility]    The following portions of the patient's history were reviewed and updated as appropriate: allergies, current medications, past family history, past medical history, past social history, past surgical history and problem list.     Social History     Tobacco Use   • Smoking status: Never Smoker   • Smokeless tobacco: Never Used   Substance Use Topics   • Alcohol use: Yes     Comment: Maybe once a month       Past Medical History:   Diagnosis Date   • Age-related nuclear cataract of both eyes 4/23/2018   • Amblyopia of eye, left    • Arthralgia of elbow 7/31/2016    Description: chronic tendonitis of the elbows, ortho -   • Closed comminuted fracture of 2nd toe, intra-articular 7/24/2017    Podiatry - Dr. Velásquez (5/31/17): postop shoe, RTC 4 wks   • Finger amputation, traumatic 1991    h/o L.index finger amp seconary to bleacher injury, s/p failed surgery   • Foot pain 7/31/2016    Impression: 06/05/2015 - cont with cream per Dr. Davidson; patient currently not interested in surgery  Impression: 04/20/2015 - ongoing eval per Dr. Davidson; exercise modification as above; surgery would be last resort  Impression: 01/20/2015 - rec f/u with Dr. Davidson; Description: secondary to 2nd MTP capsulitis, related to hammertoe (4/15), s/p injection (2/16); podiatry - Dr. Davidson   • Hx of chest x-ray 02/28/2018    CXR (2/28/18): lenticular  opacities at bases sugg of atelectasis   • Hx of chest x-ray 09/16/2019    CXR (9/16/19): chronic changes of atelectasia dn scarring at right hilar and infrahilar region as well as prior healed   • Hx of colonoscopy 06/04/2012    colonosc (6/4/12): polyp, diverticulosis, int hem; GI - Dr. George   • Hx of colonoscopy 07/24/2017    colonosc (7/24/17): polyp; GI - Dr. George   • Hx of exercise stress test 02/11/2015    nl GXT (2/11/15)   • Right hip pain 1/31/2017 9/11/1R. Sciatic, now with proximal hamstring tendinitis, cont hamstring stretches, yoga, and walking exercise; f/u prn flare-up; ortho - Dr. Trevino; 7/7/17 no longer with sciatic sxs, agree with yoga and aquatic exercises, consider MRI L-spine if no better, maki Trevino; 6/1/17 R. Hip pain due to mild degenerative changes by xray, rx PT at [facility] and rtc 6 wks, ortho Ncik Trevino; 3/29/17 R   • Trochanteric bursitis of right hip 02/08/2017    s/p injection (2/8/17), resolved; ortho - Dr. Trevino       Past Surgical History:   Procedure Laterality Date   • AMPUTATION DIGIT Left 1991     History of Amputated Index Finger DIP Joint(s) secondary to injury at bleachers; s/p failed surgery x2    • BUNIONECTOMY  10/2015    s/p [place] bunionectomy, 2nd metatarsal osteotomy shortening, arthrodesis (10/15); podiatry - Dr. Davidson   • COLONOSCOPY  2017    You already have this information   • KNEE SURGERY Left 1985   • METATARSAL OSTEOTOMY      history of buniuon correction with metatarsal osteotomy  s/p [place] bunionectomty, 2nd metatarsal osteotomy shortening, arthrodesis (10/15);  podiatry - Dr. Davidson       Allergies   Allergen Reactions   • Cosamin Ds [Glucosamine-Chondroitin] Hives   • Erythromycin Unknown (See Comments)   • Naproxen    • Sulfa Antibiotics Hives   • Sulfamethoxazole Unknown (See Comments)   • Trimethoprim Unknown (See Comments)         Current Outpatient Medications:   •  aspirin 81 MG tablet, Take 1 tablet by mouth Daily., Disp: , Rfl:    •  budesonide-formoterol (SYMBICORT) 80-4.5 MCG/ACT inhaler, 2 puffs BID, gargle and spit after puffs, Disp: 1 inhaler, Rfl: 5  •  carboxymethylcellulose (REFRESH PLUS) 0.5 % solution, 3 (Three) Times a Day As Needed for Dry Eyes., Disp: , Rfl:   •  cetirizine (zyrTEC) 10 MG tablet, Take 10 mg by mouth Daily., Disp: , Rfl:   •  Cholecalciferol (VITAMIN D) 2000 UNITS capsule, Take 1 capsule by mouth daily., Disp: , Rfl:   •  Cinnamon 500 MG tablet, Take 1 tablet by mouth daily., Disp: , Rfl:   •  Fluticasone Furoate (FLONASE SENSIMIST NA), , Disp: , Rfl:   •  ibuprofen (ADVIL,MOTRIN) 600 MG tablet, Take 1 tablet by mouth 2 (two) times a day as needed., Disp: , Rfl:   •  Multiple Vitamins-Minerals (CENTRUM SILVER ADULT 50+ PO), Take 1 tablet by mouth daily., Disp: , Rfl:   •  Multiple Vitamins-Minerals (OCUVITE-LUTEIN PO), PreserVision AREDS-2, Disp: , Rfl:   •  Omega-3 Krill Oil 1000 MG capsule, Take 1 tablet by mouth daily., Disp: , Rfl:   •  Probiotic Product (PROBIOTIC-10 PO), Take  by mouth., Disp: , Rfl:   •  Turmeric 500 MG capsule, Take 500 mg by mouth Daily., Disp: , Rfl:   •  promethazine-dextromethorphan (PROMETHAZINE-DM) 6.25-15 MG/5ML syrup, Take 5 mL by mouth 4 (Four) Times a Day As Needed for Cough., Disp: 240 mL, Rfl: 0  •  Pseudoephedrine-guaiFENesin ER (MUCINEX D MAX STRENGTH) 120-1200 MG tablet sustained-release 12 hour, Take 1 tablet by mouth Every 12 (Twelve) Hours for 7 days., Disp: 14 each, Rfl: 0    Review of Systems  Review of Systems   Constitutional: Negative for appetite change, chills, diaphoresis, fatigue and fever.   HENT: Positive for congestion, postnasal drip and sinus pressure. Negative for ear pain, rhinorrhea, sinus pain and sore throat.    Eyes: Positive for discharge (watery) and itching. Negative for visual disturbance.   Respiratory: Positive for cough. Negative for shortness of breath and wheezing.    Cardiovascular: Negative for chest pain and palpitations.  "  Gastrointestinal: Negative for abdominal pain, constipation, diarrhea, nausea and vomiting.   Musculoskeletal: Negative for myalgias.   Skin: Negative for color change and rash.   Neurological: Positive for light-headedness and headaches. Negative for dizziness and numbness.   Hematological: Negative for adenopathy. Does not bruise/bleed easily.   Psychiatric/Behavioral: Negative for confusion and sleep disturbance.       Vitals:  Vitals:    12/22/19 0923   BP: 134/70   Pulse: 71   Temp: 98.4 °F (36.9 °C)   TempSrc: Oral   SpO2: 92%   Weight: 79.4 kg (175 lb)   Height: 174.6 cm (68.74\")       Physical Exam  Physical Exam   Constitutional: He is oriented to person, place, and time. Vital signs are normal. He appears well-developed and well-nourished.   HENT:   Head: Normocephalic and atraumatic.   Right Ear: Tympanic membrane, external ear and ear canal normal.   Left Ear: Tympanic membrane, external ear and ear canal normal.   Nose: Mucosal edema present. No rhinorrhea or congestion. Right sinus exhibits maxillary sinus tenderness. Right sinus exhibits no frontal sinus tenderness. Left sinus exhibits maxillary sinus tenderness. Left sinus exhibits no frontal sinus tenderness.   Mouth/Throat: Uvula is midline, oropharynx is clear and moist and mucous membranes are normal.   Eyes: Pupils are equal, round, and reactive to light. Conjunctivae and lids are normal.   Neck: Trachea normal, normal range of motion and phonation normal. Neck supple.   Cardiovascular: Normal rate, regular rhythm and normal heart sounds.   Pulmonary/Chest: Effort normal and breath sounds normal. No respiratory distress. He has no decreased breath sounds. He has no wheezes. He has no rhonchi. He has no rales.   Lymphadenopathy:        Head (right side): No submental, no submandibular, no tonsillar, no preauricular and no posterior auricular adenopathy present.        Head (left side): No submental, no submandibular, no tonsillar, no " preauricular and no posterior auricular adenopathy present.     He has no cervical adenopathy.   Neurological: He is alert and oriented to person, place, and time.   Skin: Skin is warm and dry. No rash noted.   Psychiatric: He has a normal mood and affect. His behavior is normal.   Nursing note and vitals reviewed.      Labs  None this visit    Assessment/Plan    Yoseph was seen today for sinus problem.    Diagnoses and all orders for this visit:    Viral upper respiratory tract infection  -     Pseudoephedrine-guaiFENesin ER (MUCINEX D MAX STRENGTH) 120-1200 MG tablet sustained-release 12 hour; Take 1 tablet by mouth Every 12 (Twelve) Hours for 7 days.  -     promethazine-dextromethorphan (PROMETHAZINE-DM) 6.25-15 MG/5ML syrup; Take 5 mL by mouth 4 (Four) Times a Day As Needed for Cough.    Discussed Viral vs. Bacterial etiology. Signs and symptoms consistent with viral infection at this time. Advised in symptomatic relief with recommended OTC medications or prescribed medications this visit. Encouraged rest and hydration. Salt water gargles/chloraseptic spray for sore throat prn. Tylenol/Ibuprofen prn for fevers, HA, body aches. Advised in hygiene measures and time frame of 7-10 days for resolution of symptoms.     Plan of care reviewed with patient at conclusion of today's visit. Patient education was provided regarding diagnosis, management, and prescribed or recommended OTC medications. Patient verbalized understanding and agreement with plan of care.     Follow-Up  Return if symptoms worsen or fail to improve.    Electronically Signed By:  ANAID Rodriguez

## 2020-02-09 ENCOUNTER — OFFICE VISIT (OUTPATIENT)
Dept: INTERNAL MEDICINE | Facility: CLINIC | Age: 65
End: 2020-02-09

## 2020-02-09 ENCOUNTER — APPOINTMENT (OUTPATIENT)
Dept: MRI IMAGING | Facility: HOSPITAL | Age: 65
End: 2020-02-09

## 2020-02-09 ENCOUNTER — APPOINTMENT (OUTPATIENT)
Dept: GENERAL RADIOLOGY | Facility: HOSPITAL | Age: 65
End: 2020-02-09

## 2020-02-09 ENCOUNTER — HOSPITAL ENCOUNTER (EMERGENCY)
Facility: HOSPITAL | Age: 65
Discharge: HOME | End: 2020-02-09
Attending: EMERGENCY MEDICINE | Admitting: EMERGENCY MEDICINE

## 2020-02-09 VITALS
BODY MASS INDEX: 26.96 KG/M2 | WEIGHT: 182 LBS | DIASTOLIC BLOOD PRESSURE: 83 MMHG | OXYGEN SATURATION: 98 % | TEMPERATURE: 97.8 F | HEART RATE: 76 BPM | SYSTOLIC BLOOD PRESSURE: 140 MMHG | RESPIRATION RATE: 16 BRPM | HEIGHT: 69 IN

## 2020-02-09 VITALS
TEMPERATURE: 97.2 F | SYSTOLIC BLOOD PRESSURE: 128 MMHG | HEART RATE: 74 BPM | WEIGHT: 185 LBS | HEIGHT: 69 IN | BODY MASS INDEX: 27.4 KG/M2 | DIASTOLIC BLOOD PRESSURE: 80 MMHG | OXYGEN SATURATION: 98 %

## 2020-02-09 DIAGNOSIS — G51.0 LEFT-SIDED BELL'S PALSY: Primary | ICD-10-CM

## 2020-02-09 DIAGNOSIS — R03.0 ELEVATED BLOOD PRESSURE READING WITHOUT DIAGNOSIS OF HYPERTENSION: ICD-10-CM

## 2020-02-09 DIAGNOSIS — R51.9 ACUTE INTRACTABLE HEADACHE, UNSPECIFIED HEADACHE TYPE: ICD-10-CM

## 2020-02-09 DIAGNOSIS — R42 DIZZINESS: Primary | ICD-10-CM

## 2020-02-09 DIAGNOSIS — R29.810 FACIAL DROOP: ICD-10-CM

## 2020-02-09 LAB
ALBUMIN SERPL-MCNC: 4.5 G/DL (ref 3.5–5.2)
ALBUMIN/GLOB SERPL: 2 G/DL
ALP SERPL-CCNC: 71 U/L (ref 39–117)
ALT SERPL W P-5'-P-CCNC: 18 U/L (ref 1–41)
ANION GAP SERPL CALCULATED.3IONS-SCNC: 9 MMOL/L (ref 5–15)
AST SERPL-CCNC: 17 U/L (ref 1–40)
BASOPHILS # BLD AUTO: 0.03 10*3/MM3 (ref 0–0.2)
BASOPHILS NFR BLD AUTO: 0.2 % (ref 0–1.5)
BILIRUB SERPL-MCNC: 0.5 MG/DL (ref 0.2–1.2)
BUN BLD-MCNC: 19 MG/DL (ref 8–23)
BUN/CREAT SERPL: 21.1 (ref 7–25)
CALCIUM SPEC-SCNC: 9 MG/DL (ref 8.6–10.5)
CHLORIDE SERPL-SCNC: 104 MMOL/L (ref 98–107)
CO2 SERPL-SCNC: 27 MMOL/L (ref 22–29)
CREAT BLD-MCNC: 0.9 MG/DL (ref 0.76–1.27)
DEPRECATED RDW RBC AUTO: 41.7 FL (ref 37–54)
EOSINOPHIL # BLD AUTO: 0.01 10*3/MM3 (ref 0–0.4)
EOSINOPHIL NFR BLD AUTO: 0.1 % (ref 0.3–6.2)
ERYTHROCYTE [DISTWIDTH] IN BLOOD BY AUTOMATED COUNT: 12.2 % (ref 12.3–15.4)
GFR SERPL CREATININE-BSD FRML MDRD: 84 ML/MIN/1.73
GLOBULIN UR ELPH-MCNC: 2.2 GM/DL
GLUCOSE BLD-MCNC: 177 MG/DL (ref 65–99)
HCT VFR BLD AUTO: 45.1 % (ref 37.5–51)
HGB BLD-MCNC: 15.2 G/DL (ref 13–17.7)
HOLD SPECIMEN: NORMAL
HOLD SPECIMEN: NORMAL
IMM GRANULOCYTES # BLD AUTO: 0.05 10*3/MM3 (ref 0–0.05)
IMM GRANULOCYTES NFR BLD AUTO: 0.4 % (ref 0–0.5)
LYMPHOCYTES # BLD AUTO: 0.54 10*3/MM3 (ref 0.7–3.1)
LYMPHOCYTES NFR BLD AUTO: 4.4 % (ref 19.6–45.3)
MAGNESIUM SERPL-MCNC: 2.1 MG/DL (ref 1.6–2.4)
MCH RBC QN AUTO: 31.5 PG (ref 26.6–33)
MCHC RBC AUTO-ENTMCNC: 33.7 G/DL (ref 31.5–35.7)
MCV RBC AUTO: 93.6 FL (ref 79–97)
MONOCYTES # BLD AUTO: 0.37 10*3/MM3 (ref 0.1–0.9)
MONOCYTES NFR BLD AUTO: 3 % (ref 5–12)
NEUTROPHILS # BLD AUTO: 11.14 10*3/MM3 (ref 1.7–7)
NEUTROPHILS NFR BLD AUTO: 91.9 % (ref 42.7–76)
NRBC BLD AUTO-RTO: 0 /100 WBC (ref 0–0.2)
PLATELET # BLD AUTO: 165 10*3/MM3 (ref 140–450)
PMV BLD AUTO: 11 FL (ref 6–12)
POTASSIUM BLD-SCNC: 4 MMOL/L (ref 3.5–5.2)
PROT SERPL-MCNC: 6.7 G/DL (ref 6–8.5)
RBC # BLD AUTO: 4.82 10*6/MM3 (ref 4.14–5.8)
SODIUM BLD-SCNC: 140 MMOL/L (ref 136–145)
TROPONIN T SERPL-MCNC: <0.01 NG/ML (ref 0–0.03)
WBC NRBC COR # BLD: 12.14 10*3/MM3 (ref 3.4–10.8)
WHOLE BLOOD HOLD SPECIMEN: NORMAL
WHOLE BLOOD HOLD SPECIMEN: NORMAL

## 2020-02-09 PROCEDURE — 71045 X-RAY EXAM CHEST 1 VIEW: CPT

## 2020-02-09 PROCEDURE — 93005 ELECTROCARDIOGRAM TRACING: CPT | Performed by: EMERGENCY MEDICINE

## 2020-02-09 PROCEDURE — 80053 COMPREHEN METABOLIC PANEL: CPT | Performed by: EMERGENCY MEDICINE

## 2020-02-09 PROCEDURE — 70551 MRI BRAIN STEM W/O DYE: CPT

## 2020-02-09 PROCEDURE — 84484 ASSAY OF TROPONIN QUANT: CPT | Performed by: EMERGENCY MEDICINE

## 2020-02-09 PROCEDURE — 83735 ASSAY OF MAGNESIUM: CPT | Performed by: EMERGENCY MEDICINE

## 2020-02-09 PROCEDURE — 99284 EMERGENCY DEPT VISIT MOD MDM: CPT

## 2020-02-09 PROCEDURE — 85025 COMPLETE CBC W/AUTO DIFF WBC: CPT | Performed by: EMERGENCY MEDICINE

## 2020-02-09 PROCEDURE — 99214 OFFICE O/P EST MOD 30 MIN: CPT | Performed by: NURSE PRACTITIONER

## 2020-02-09 RX ORDER — PREDNISONE 10 MG/1
TABLET ORAL
Qty: 35 TABLET | Refills: 0 | Status: SHIPPED | OUTPATIENT
Start: 2020-02-09 | End: 2020-02-24

## 2020-02-09 RX ORDER — VALACYCLOVIR HYDROCHLORIDE 1 G/1
1000 TABLET, FILM COATED ORAL 2 TIMES DAILY
Qty: 14 TABLET | Refills: 0 | Status: SHIPPED | OUTPATIENT
Start: 2020-02-09 | End: 2020-05-28

## 2020-02-09 RX ORDER — FLUTICASONE PROPIONATE 50 MCG
2 SPRAY, SUSPENSION (ML) NASAL DAILY
Qty: 1 BOTTLE | Refills: 0 | Status: SHIPPED | OUTPATIENT
Start: 2020-02-09 | End: 2020-05-28 | Stop reason: SDUPTHER

## 2020-02-09 RX ORDER — SODIUM CHLORIDE 0.9 % (FLUSH) 0.9 %
10 SYRINGE (ML) INJECTION AS NEEDED
Status: DISCONTINUED | OUTPATIENT
Start: 2020-02-09 | End: 2020-02-09 | Stop reason: HOSPADM

## 2020-02-09 NOTE — ED PROVIDER NOTES
Subjective   The patient presents to the emergency department with episodes of nausea, headache, dizziness that started around 630 this morning.  Patient has apparently had several episodes on and off throughout the morning.  Patient went to an outpatient clinic where he was evaluated by nurse practitioner who noted some possible very mild left facial asymmetry.  He was referred here to the emergency department for further evaluation and management.  Patient does report some mild numbness on the left side of the face.  He denies any weakness of his arms or legs.  He denies any difficulty walking.  He denies any changes in his speech or difficulty with fine motor skills.  He denies any fever or chills.  No other acute symptoms on presentation to the emergency department.      History provided by:  Patient      Review of Systems   Constitutional: Negative.    Respiratory: Negative.    Cardiovascular: Negative.    Gastrointestinal: Positive for nausea.   Skin: Negative.    Neurological: Positive for dizziness and headaches.   Psychiatric/Behavioral: Negative.    All other systems reviewed and are negative.      Past Medical History:   Diagnosis Date   • Age-related nuclear cataract of both eyes 4/23/2018   • Amblyopia of eye, left    • Arthralgia of elbow 7/31/2016    Description: chronic tendonitis of the elbows, ortho -   • Closed comminuted fracture of 2nd toe, intra-articular 7/24/2017    Podiatry - Dr. Velásquez (5/31/17): postop shoe, RTC 4 wks   • Finger amputation, traumatic 1991    h/o L.index finger amp seconary to bleacher injury, s/p failed surgery   • Foot pain 7/31/2016    Impression: 06/05/2015 - cont with cream per Dr. Davidson; patient currently not interested in surgery  Impression: 04/20/2015 - ongoing eval per Dr. Davidson; exercise modification as above; surgery would be last resort  Impression: 01/20/2015 - rec f/u with Dr. Davidson; Description: secondary to 2nd MTP capsulitis, related to hammertoe (4/15),  s/p injection (2/16); podiatry - Dr. Davidson   • Hx of chest x-ray 02/28/2018    CXR (2/28/18): lenticular opacities at bases sugg of atelectasis   • Hx of chest x-ray 09/16/2019    CXR (9/16/19): chronic changes of atelectasia dn scarring at right hilar and infrahilar region as well as prior healed   • Hx of colonoscopy 06/04/2012    colonosc (6/4/12): polyp, diverticulosis, int hem; GI - Dr. George   • Hx of colonoscopy 07/24/2017    colonosc (7/24/17): polyp; GI - Dr. George   • Hx of exercise stress test 02/11/2015    nl GXT (2/11/15)   • Right hip pain 1/31/2017 9/11/1R. Sciatic, now with proximal hamstring tendinitis, cont hamstring stretches, yoga, and walking exercise; f/u prn flare-up; ortho Nick Trevino; 7/7/17 no longer with sciatic sxs, agree with yoga and aquatic exercises, consider MRI L-spine if no better, maki Trevino; 6/1/17 R. Hip pain due to mild degenerative changes by xray, rx PT at [facility] and rtc 6 wks, maki Trevino; 3/29/17 R   • Trochanteric bursitis of right hip 02/08/2017    s/p injection (2/8/17), resolved; ortho Nick Trevino       Allergies   Allergen Reactions   • Cosamin Ds [Glucosamine-Chondroitin] Hives   • Erythromycin Unknown (See Comments)   • Naproxen    • Sulfa Antibiotics Hives   • Sulfamethoxazole Unknown (See Comments)   • Trimethoprim Unknown (See Comments)       Past Surgical History:   Procedure Laterality Date   • AMPUTATION DIGIT Left 1991     History of Amputated Index Finger DIP Joint(s) secondary to injury at bleachers; s/p failed surgery x2    • BUNIONECTOMY  10/2015    s/p Hudson bunionectomy, 2nd metatarsal osteotomy shortening, arthrodesis (10/15); podiatry - Dr. Davidson   • CARDIAC SURGERY     • COLONOSCOPY  2017    You already have this information   • INGUINAL HERNIA REPAIR     • KNEE SURGERY Left 1985   • METATARSAL OSTEOTOMY      history of buniuon correction with metatarsal osteotomy  s/p Hudson bunionectomty, 2nd metatarsal osteotomy  shortening, arthrodesis (10/15);  podiatry - Dr. Davidson       Family History   Problem Relation Age of Onset   • Heart attack Mother    • Diabetes Mother    • Heart disease Mother    • Hypertension Mother    • Heart attack Father    • Aneurysm Father         AAA   • Coronary artery disease Father         CABG   • Heart failure Father    • Arrhythmia Father         pacemaker   • Skin cancer Father    • Valvular heart disease Father    • Hyperlipidemia Sister    • Heart attack Brother    • Hypertension Brother    • Heart attack Cousin         pat cousin  age 44   • Hyperlipidemia Sister    • Hypertension Brother    • Hyperlipidemia Brother        Social History     Socioeconomic History   • Marital status:      Spouse name: Not on file   • Number of children: 3   • Years of education: Not on file   • Highest education level: Not on file   Occupational History   • Occupation:      Comment: travels and gives presentations   Tobacco Use   • Smoking status: Never Smoker   • Smokeless tobacco: Never Used   Substance and Sexual Activity   • Alcohol use: Yes     Comment: Maybe once a month   • Drug use: No   • Sexual activity: Never   Social History Narrative    3 sons        Teaches only civil engineering classes at            Objective   Physical Exam   Constitutional: He is oriented to person, place, and time. He appears well-developed and well-nourished.   HENT:   Head: Normocephalic and atraumatic.   Right Ear: External ear normal.   Left Ear: External ear normal.   Mouth/Throat: Oropharynx is clear and moist.   Eyes: Conjunctivae are normal.   Neck: Normal range of motion. Neck supple.   Cardiovascular: Normal rate, regular rhythm, normal heart sounds and intact distal pulses.   Pulmonary/Chest: Effort normal and breath sounds normal. No respiratory distress.   Abdominal: Soft. There is no tenderness. There is no guarding.   Musculoskeletal: Normal range of motion.   Strength is  intact and equal in all 4 extremities.  No drift noted.   Neurological: He is alert and oriented to person, place, and time.   Very minimal left facial asymmetry and very mild decrease sensation on the left side of the face.  No other deficits are appreciated at this time.  NIHSS 2.  Symptoms are isolated to the left side of the face.   Skin: Skin is warm and dry. Capillary refill takes less than 2 seconds.   Psychiatric: He has a normal mood and affect. His behavior is normal.   Nursing note and vitals reviewed.      Procedures           ED Course  ED Course as of Feb 09 1658   Sun Feb 09, 2020   1110 Patient does have very minimal deficit on the left side of the face.  This most likely represents a Bell's palsy with the associated headache and dizziness.  We will obtain MRI of the brain.  Patient is outside any qualifying times for TPA consideration.    [RS]   1212 Troponin T: <0.010 [RS]   1329 MRI demonstrates no evidence of stroke.  I do feel with the localization of symptoms including mild sensory and mild motor deficit of the left side of the face that this is likely related to a Bell's palsy.  I talked with the patient at length about this diagnosis with expected course, symptoms, risks, follow-up, and return instructions. I had a discussion with the patient/family regarding diagnosis, diagnostic results, treatment plan, and medications.  The patient/family indicated understanding of these instructions.  I spent adequate time at the bedside proceeding discharge necessary to personally discuss the aftercare instructions, giving patient education, providing explanations of the results of our evaluations/findings, and my decision making to assure that the patient/family understand the plan of care.  Time was allotted to answer questions at that time and throughout the ED course.  Emphasis was placed on timely follow-up after discharge.  I also discussed the potential for the development of an acute emergent  condition requiring further evaluation, admission, or even surgical intervention. I discussed that we found nothing during the visit today indicating the need for further workup, admission, or the presence of an unstable medical condition.  I encouraged the patient to return to the emergency department immediately for ANY concerns, worsening, new complaints, or if symptoms persist and unable to seek follow-up in a timely fashion.  The patient/family expressed understanding and agreement with this plan.     [RS]      ED Course User Index  [RS] Houston Mcbride MD                                               MDM  Number of Diagnoses or Management Options  Elevated blood pressure reading without diagnosis of hypertension:   Left-sided Bell's palsy:   Diagnosis management comments: Recent Results (from the past 24 hour(s))  -Comprehensive Metabolic Panel  Collection Time: 02/09/20 11:17 AM       Result                      Value             Ref Range           Glucose                     177 (H)           65 - 99 mg/dL       BUN                         19                8 - 23 mg/dL        Creatinine                  0.90              0.76 - 1.27 *       Sodium                      140               136 - 145 mm*       Potassium                   4.0               3.5 - 5.2 mm*       Chloride                    104               98 - 107 mmo*       CO2                         27.0              22.0 - 29.0 *       Calcium                     9.0               8.6 - 10.5 m*       Total Protein               6.7               6.0 - 8.5 g/*       Albumin                     4.50              3.50 - 5.20 *       ALT (SGPT)                  18                1 - 41 U/L          AST (SGOT)                  17                1 - 40 U/L          Alkaline Phosphatase        71                39 - 117 U/L        Total Bilirubin             0.5               0.2 - 1.2 mg*       eGFR Non  Amer       84                 >60 mL/min/1*       Globulin                    2.2               gm/dL               A/G Ratio                   2.0               g/dL                BUN/Creatinine Ratio        21.1              7.0 - 25.0          Anion Gap                   9.0               5.0 - 15.0 m*  -Troponin  Collection Time: 02/09/20 11:17 AM       Result                      Value             Ref Range           Troponin T                  <0.010            0.000 - 0.03*  -Magnesium  Collection Time: 02/09/20 11:17 AM       Result                      Value             Ref Range           Magnesium                   2.1               1.6 - 2.4 mg*  -Light Blue Top  Collection Time: 02/09/20 11:17 AM       Result                      Value             Ref Range           Extra Tube                                                    hold for add-on  -Green Top (Gel)  Collection Time: 02/09/20 11:17 AM       Result                      Value             Ref Range           Extra Tube                                                    Hold for add-ons.  -Lavender Top  Collection Time: 02/09/20 11:17 AM       Result                      Value             Ref Range           Extra Tube                                                    hold for add-on  -Gold Top - SST  Collection Time: 02/09/20 11:17 AM       Result                      Value             Ref Range           Extra Tube                                                    Hold for add-ons.  -CBC Auto Differential  Collection Time: 02/09/20 11:17 AM       Result                      Value             Ref Range           WBC                         12.14 (H)         3.40 - 10.80*       RBC                         4.82              4.14 - 5.80 *       Hemoglobin                  15.2              13.0 - 17.7 *       Hematocrit                  45.1              37.5 - 51.0 %       MCV                         93.6              79.0 - 97.0 *       MCH                         31.5               26.6 - 33.0 *       MCHC                        33.7              31.5 - 35.7 *       RDW                         12.2 (L)          12.3 - 15.4 %       RDW-SD                      41.7              37.0 - 54.0 *       MPV                         11.0              6.0 - 12.0 fL       Platelets                   165               140 - 450 10*       Neutrophil %                91.9 (H)          42.7 - 76.0 %       Lymphocyte %                4.4 (L)           19.6 - 45.3 %       Monocyte %                  3.0 (L)           5.0 - 12.0 %        Eosinophil %                0.1 (L)           0.3 - 6.2 %         Basophil %                  0.2               0.0 - 1.5 %         Immature Grans %            0.4               0.0 - 0.5 %         Neutrophils, Absolute       11.14 (H)         1.70 - 7.00 *       Lymphocytes, Absolute       0.54 (L)          0.70 - 3.10 *       Monocytes, Absolute         0.37              0.10 - 0.90 *       Eosinophils, Absolute       0.01              0.00 - 0.40 *       Basophils, Absolute         0.03              0.00 - 0.20 *       Immature Grans, Absolu*     0.05              0.00 - 0.05 *       nRBC                        0.0               0.0 - 0.2 /1*  Note: In addition to lab results from this visit, the labs listed above may include labs taken at another facility or during a different encounter within the last 24 hours. Please correlate lab times with ED admission and discharge times for further clarification of the services performed during this visit.    MRI Brain Without Contrast   Preliminary Result    Mild chronic small vessel ischemic changes seen in the    periventricular and subcortical white matter with no acute intracranial    normality. Fluid in the mastoid air cells bilaterally with chronic    paranasal sinusitis. No acute intracranial abnormality is identified.                XR Chest 1 View   Preliminary Result    Chronic changes in with the lung fields with  "no evidence of    acute parenchymal disease.               -------------------------------------------------               02/09/20 02/09/20 02/09/20                  1110          1130      1233     -------------------------------------------------   BP:          163/78        130/73    140/83     BP Location:    Right arm                           Patient Position:     Sitting                            Pulse:         75            73        76       Resp:          16                      16       Temp:   97.8 °F (36.6 °C)                       TempSrc:      Oral                              SpO2:          97%          95%       98%       Weight: 82.6 kg (182 lb)                        Height:  174 cm (68.5\")                        -------------------------------------------------  Medications - No data to display  ECG/EMG Results (last 24 hours)     Procedure Component Value Units Date/Time    ECG 12 Lead ([id number]) Collected:  02/09/20 1100     Updated:  02/09/20 1059      ECG 12 Lead               Amount and/or Complexity of Data Reviewed  Clinical lab tests: reviewed  Tests in the radiology section of CPT®: reviewed  Independent visualization of images, tracings, or specimens: yes        Final diagnoses:   Left-sided Bell's palsy   Elevated blood pressure reading without diagnosis of hypertension            Houston Mcbride MD  02/09/20 8018    "

## 2020-02-09 NOTE — PROGRESS NOTES
Chief Complaint   Patient presents with   • Dizziness     states he feels really dizzy        History of Present Illness  64 y.o.male presents for dizziness.    Very dizzy last couple hours light headed comes in waves  With nausea. felt like going to pass out. Thrown up once this morning; had eaten. Feels lightheadded back of head area. Right before this started got a wave hot flash. Back of head hurts no vision changes today; has had blurred vsion going on for awhile possible cataracts. Headache feels like pressure no radiation.  No confusion.  No face tingling no speech changes.  moving arms legs ok no numbness tingling or weakness. Just feels tense all over. No changes in gait. No hearing changes no tinitus. No recent URI.  No sinus pain pressure. No abd pain . Last bowel movement this monring no diarrhea no fever some chills. No new meds; taking meds like normal supplements.  grandkids over but not sick.     Review of Systems   Constitutional: Positive for chills.        Feel hot   HENT: Negative for hearing loss, sinus pressure and tinnitus.    Eyes: Positive for blurred vision. Negative for double vision and visual disturbance.   Respiratory: Negative for shortness of breath.    Cardiovascular: Negative for palpitations.   Gastrointestinal: Positive for nausea and vomiting. Negative for abdominal pain.   Genitourinary: Negative for difficulty urinating.   Musculoskeletal: Positive for arthralgias.   Skin: Negative for rash.   Neurological: Positive for dizziness, facial asymmetry, light-headedness and headache. Negative for speech difficulty, weakness, numbness and confusion.         [facility]  The following portions of the patient's history were reviewed and updated as appropriate: allergies, current medications, past family history, past medical history, past social history, past surgical history and problem list.     Past Medical History:   Diagnosis Date   • Age-related nuclear cataract of both eyes 4/23/2018    • Amblyopia of eye, left    • Arthralgia of elbow 7/31/2016    Description: chronic tendonitis of the elbows, ortho -   • Closed comminuted fracture of 2nd toe, intra-articular 7/24/2017    Podiatry - Dr. Velásquez (5/31/17): postop shoe, RTC 4 wks   • Finger amputation, traumatic 1991    h/o L.index finger amp seconary to bleacher injury, s/p failed surgery   • Foot pain 7/31/2016    Impression: 06/05/2015 - cont with cream per Dr. Davidson; patient currently not interested in surgery  Impression: 04/20/2015 - ongoing eval per Dr. Davidson; exercise modification as above; surgery would be last resort  Impression: 01/20/2015 - rec f/u with Dr. Davidson; Description: secondary to 2nd MTP capsulitis, related to hammertoe (4/15), s/p injection (2/16); podiatry - Dr. Davidson   • Hx of chest x-ray 02/28/2018    CXR (2/28/18): lenticular opacities at bases sugg of atelectasis   • Hx of chest x-ray 09/16/2019    CXR (9/16/19): chronic changes of atelectasia dn scarring at right hilar and infrahilar region as well as prior healed   • Hx of colonoscopy 06/04/2012    colonosc (6/4/12): polyp, diverticulosis, int hem; GI - Dr. George   • Hx of colonoscopy 07/24/2017    colonosc (7/24/17): polyp; GI - Dr. George   • Hx of exercise stress test 02/11/2015    nl GXT (2/11/15)   • Right hip pain 1/31/2017 9/11/1R. Sciatic, now with proximal hamstring tendinitis, cont hamstring stretches, yoga, and walking exercise; f/u prn flare-up; ortho - Dr. Trevino; 7/7/17 no longer with sciatic sxs, agree with yoga and aquatic exercises, consider MRI L-spine if no better, maki Trevino; 6/1/17 R. Hip pain due to mild degenerative changes by xray, rx PT at [facility] and rtc 6 wks, ortho Nick Trevino; 3/29/17 R   • Trochanteric bursitis of right hip 02/08/2017    s/p injection (2/8/17), resolved; ortho - Dr. Treivno      Past Surgical History:   Procedure Laterality Date   • AMPUTATION DIGIT Left 1991     History of Amputated Index Finger DIP  Joint(s) secondary to injury at bleachers; s/p failed surgery x2    • BUNIONECTOMY  10/2015    s/p Hudson bunionectomy, 2nd metatarsal osteotomy shortening, arthrodesis (10/15); podiatry - Dr. Davidson   • CARDIAC SURGERY     • COLONOSCOPY      You already have this information   • INGUINAL HERNIA REPAIR     • KNEE SURGERY Left    • METATARSAL OSTEOTOMY      history of buniuon correction with metatarsal osteotomy  s/p Hudson bunionectomty, 2nd metatarsal osteotomy shortening, arthrodesis (10/15);  podiatry - Dr. Davidson      Allergies   Allergen Reactions   • Cosamin Ds [Glucosamine-Chondroitin] Hives   • Erythromycin Unknown (See Comments)   • Naproxen    • Sulfa Antibiotics Hives   • Sulfamethoxazole Unknown (See Comments)   • Trimethoprim Unknown (See Comments)      Social History     Socioeconomic History   • Marital status:      Spouse name: Not on file   • Number of children: 3   • Years of education: Not on file   • Highest education level: Not on file   Occupational History   • Occupation:      Comment: travels and gives presentations   Tobacco Use   • Smoking status: Never Smoker   • Smokeless tobacco: Never Used   Substance and Sexual Activity   • Alcohol use: Yes     Comment: Maybe once a month   • Drug use: No   • Sexual activity: Never   Social History Narrative    3 sons        Teaches only civil engineering classes at [employer]     Family History   Problem Relation Age of Onset   • Heart attack Mother    • Diabetes Mother    • Heart disease Mother    • Hypertension Mother    • Heart attack Father    • Aneurysm Father         AAA   • Coronary artery disease Father         CABG   • Heart failure Father    • Arrhythmia Father         pacemaker   • Skin cancer Father    • Valvular heart disease Father    • Hyperlipidemia Sister    • Heart attack Brother    • Hypertension Brother    • Heart attack Cousin         pat cousin  age 44   • Hyperlipidemia Sister    •  "Hypertension Brother    • Hyperlipidemia Brother             Current Outpatient Medications:   •  aspirin 81 MG tablet, Take 1 tablet by mouth Daily., Disp: , Rfl:   •  budesonide-formoterol (SYMBICORT) 80-4.5 MCG/ACT inhaler, 2 puffs BID, gargle and spit after puffs, Disp: 1 inhaler, Rfl: 5  •  carboxymethylcellulose (REFRESH PLUS) 0.5 % solution, 3 (Three) Times a Day As Needed for Dry Eyes., Disp: , Rfl:   •  cetirizine (zyrTEC) 10 MG tablet, Take 10 mg by mouth Daily., Disp: , Rfl:   •  Cholecalciferol (VITAMIN D) 2000 UNITS capsule, Take 1 capsule by mouth daily., Disp: , Rfl:   •  Cinnamon 500 MG tablet, Take 1 tablet by mouth daily., Disp: , Rfl:   •  Fluticasone Furoate (FLONASE SENSIMIST NA), , Disp: , Rfl:   •  ibuprofen (ADVIL,MOTRIN) 600 MG tablet, Take 1 tablet by mouth 2 (two) times a day as needed., Disp: , Rfl:   •  Multiple Vitamins-Minerals (CENTRUM SILVER ADULT 50+ PO), Take 1 tablet by mouth daily., Disp: , Rfl:   •  Multiple Vitamins-Minerals (OCUVITE-LUTEIN PO), PreserVision AREDS-2, Disp: , Rfl:   •  Omega-3 Krill Oil 1000 MG capsule, Take 1 tablet by mouth daily., Disp: , Rfl:   •  Probiotic Product (PROBIOTIC-10 PO), Take  by mouth., Disp: , Rfl:   •  promethazine-dextromethorphan (PROMETHAZINE-DM) 6.25-15 MG/5ML syrup, Take 5 mL by mouth 4 (Four) Times a Day As Needed for Cough., Disp: 240 mL, Rfl: 0  •  Turmeric 500 MG capsule, Take 500 mg by mouth Daily., Disp: , Rfl:     VITALS:  /80   Pulse 74   Temp 97.2 °F (36.2 °C)   Ht 174.6 cm (68.74\")   Wt 83.9 kg (185 lb)   SpO2 98%   BMI 27.53 kg/m²     Physical Exam   Constitutional: He is oriented to person, place, and time. He appears well-developed and well-nourished. No distress.   Jovial; laughing   HENT:   Head: Normocephalic.   Right Ear: Tympanic membrane, external ear and ear canal normal.   Left Ear: Tympanic membrane, external ear and ear canal normal.   Nose: Nose normal.   Mouth/Throat: Oropharynx is clear and moist and " mucous membranes are normal.   Eyes: Pupils are equal, round, and reactive to light. Conjunctivae are normal. Right eye exhibits no discharge. Left eye exhibits no discharge. Right eye exhibits no nystagmus. Left eye exhibits no nystagmus.   EOM's normal other than Left eye esotropia (normal for him)     Neck: Trachea normal and normal range of motion. Neck supple. No JVD present. Carotid bruit is not present. Normal range of motion present. No thyromegaly present.   Cardiovascular: Normal rate, regular rhythm, S1 normal, S2 normal, normal heart sounds and intact distal pulses.   No edema   Pulmonary/Chest: Effort normal and breath sounds normal. No respiratory distress.   Abdominal: Soft. Bowel sounds are normal. There is no tenderness.   Musculoskeletal: Normal range of motion.   Normal ROM all major joints   Lymphadenopathy:     He has no cervical adenopathy.   Neurological: He is alert and oriented to person, place, and time. He has normal strength. He displays normal reflexes. A cranial nerve deficit and sensory deficit is present. He exhibits normal muscle tone. He displays a negative Romberg sign. Coordination normal. GCS eye subscore is 4. GCS verbal subscore is 5. GCS motor subscore is 6.   Reflex Scores:       Bicep reflexes are 2+ on the right side and 2+ on the left side.       Brachioradialis reflexes are 2+ on the right side and 2+ on the left side.       Patellar reflexes are 2+ on the right side and 2+ on the left side.       Achilles reflexes are 2+ on the right side and 2+ on the left side.  Left eye esotropia at baseline. Mild left mouth facial droop with talking. Clear speech. Cranial nerves intact except: CN V difficulty with soft sharp sensation left lower face.  CHAIDEZ equally.   Skin: Skin is warm and dry. Capillary refill takes less than 2 seconds. No rash noted.   Psychiatric: He has a normal mood and affect. His behavior is normal.   Vitals reviewed.      LABS  Results for orders placed or  performed in visit on 10/16/19   TSH   Result Value Ref Range    TSH 2.430 0.270 - 4.200 uIU/mL   Lipid panel   Result Value Ref Range    Total Cholesterol 184 0 - 200 mg/dL    Triglycerides 172 (H) 0 - 150 mg/dL    HDL Cholesterol 46 40 - 60 mg/dL    VLDL Cholesterol 34.4 mg/dL    LDL Cholesterol  104 (H) 0 - 100 mg/dL   PSA SCREENING   Result Value Ref Range    PSA 1.410 0.000 - 4.000 ng/mL   Hemoglobin A1c   Result Value Ref Range    Hemoglobin A1C 5.60 4.80 - 5.60 %   Microalbumin / Creatinine Urine Ratio -   Result Value Ref Range    Creatinine, Urine 50.5 Not Estab. mg/dL    Microalbumin, Urine 7.2 Not Estab. ug/mL    Microalbumin/Creatinine Ratio 14.3 0.0 - 30.0 mg/g creat   Comprehensive metabolic panel   Result Value Ref Range    Glucose 95 65 - 99 mg/dL    BUN 16 8 - 23 mg/dL    Creatinine 0.97 0.76 - 1.27 mg/dL    eGFR Non African Am 77 >60 mL/min/1.73    eGFR African Am 94 >60 mL/min/1.73    BUN/Creatinine Ratio 16.5 7.0 - 25.0    Sodium 142 136 - 145 mmol/L    Potassium 4.5 3.5 - 5.2 mmol/L    Chloride 102 98 - 107 mmol/L    Total CO2 27.4 22.0 - 29.0 mmol/L    Calcium 9.3 8.6 - 10.5 mg/dL    Total Protein 6.4 6.0 - 8.5 g/dL    Albumin 4.70 3.50 - 5.20 g/dL    Globulin 1.7 gm/dL    A/G Ratio 2.8 g/dL    Total Bilirubin 0.4 0.2 - 1.2 mg/dL    Alkaline Phosphatase 77 39 - 117 U/L    AST (SGOT) 18 1 - 40 U/L    ALT (SGPT) 17 1 - 41 U/L   Microscopic Examination -   Result Value Ref Range    WBC, UA 0-2 /HPF    RBC, UA 0-2 /HPF    Epithelial Cells (non renal) 0-2 /HPF    Cast Type Comment     Bacteria, UA Comment None Seen /HPF   CBC w MANUAL Differential   Result Value Ref Range    WBC 7.46 3.40 - 10.80 10*3/mm3    RBC 5.04 4.14 - 5.80 10*6/mm3    Hemoglobin 15.5 13.0 - 17.7 g/dL    Hematocrit 46.6 37.5 - 51.0 %    MCV 92.5 79.0 - 97.0 fL    MCH 30.8 26.6 - 33.0 pg    MCHC 33.3 31.5 - 35.7 g/dL    RDW 12.6 12.3 - 15.4 %    Platelets 192 140 - 450 10*3/mm3    Neutrophil Rel % 69.1 42.7 - 76.0 %     "Lymphocyte Rel % 18.8 (L) 19.6 - 45.3 %    Monocyte Rel % 9.0 5.0 - 12.0 %    Eosinophil Rel % 2.3 0.3 - 6.2 %    Basophil Rel % 0.5 0.0 - 1.5 %    Neutrophils Absolute 5.16 1.70 - 7.00 10*3/mm3    Lymphocytes Absolute 1.40 0.70 - 3.10 10*3/mm3    Monocytes Absolute 0.67 0.10 - 0.90 10*3/mm3    Eosinophils Absolute 0.17 0.00 - 0.40 10*3/mm3    Basophils Absolute 0.04 0.00 - 0.20 10*3/mm3    Immature Granulocyte Rel % 0.3 0.0 - 0.5 %    Immature Grans Absolute 0.02 0.00 - 0.05 10*3/mm3    nRBC 0.0 0.0 - 0.2 /100 WBC   Urinalysis With Microscopic -   Result Value Ref Range    Specific Gravity, UA 1.008 1.005 - 1.030    pH, UA 6.5 5.0 - 8.0    Color, UA Yellow     Appearance, UA Clear Clear    Leukocytes, UA Negative Negative    Protein Negative Negative    Glucose, UA Negative Negative    Ketones Negative Negative    Blood, UA Negative Negative    Bilirubin, UA Negative Negative    Urobilinogen, UA Comment     Nitrite, UA Negative Negative       ASSESSMENT/PLAN  Yoseph was seen today for dizziness.    Diagnoses and all orders for this visit:    Dizziness    Facial droop    Acute intractable headache, unspecified headache type    65 yo male pt of Dr. Adamson, who presents with acute complaints dizziness and headache. Has esotropia \"lazy eye\" since child. This is the first time I have seen him. He does have some lower face drooping on left side and mild mouth droop to left. With presenting sx advised needs emergency room eval today with radiology studies.     I called report to [facility] ER triage nurse; upon arrival pt needs emergent eval.  Pt declined emergency ambulance transport; signed form. Pt aware needs to go straight to ER.  He is accompanied by wife and she is driving agreeable to take him.    I discussed the patients findings and my recommendations with patient.  Patient was encouraged to keep me informed of any acute changes, lack of improvement, or any new concerning symptoms.  Patient voiced understanding of all " instructions and denied further questions.      FOLLOW-UP  Return if symptoms worsen or fail to improve, for need FU with PCP post hospital.    Electronically signed by:    ANAID Reed  02/09/2020    EMR Dragon/Transcription Disclaimer:  Much of this encounter note is an electronic transcription/translation of spoken language to printed text.  The electronic translation of spoken language may permit erroneous, or at times, nonsensical words or phrases to be inadvertently transcribed.  Although I have reviewed the note for such errors, some may still exist

## 2020-02-10 ENCOUNTER — EPISODE CHANGES (OUTPATIENT)
Dept: CASE MANAGEMENT | Facility: OTHER | Age: 65
End: 2020-02-10

## 2020-02-10 PROBLEM — G51.0 LEFT-SIDED BELL'S PALSY: Status: ACTIVE | Noted: 2020-02-10

## 2020-02-10 PROBLEM — H02.30 EYELID EXCESS SKIN: Status: RESOLVED | Noted: 2018-04-23 | Resolved: 2020-02-10

## 2020-02-11 ENCOUNTER — OFFICE VISIT (OUTPATIENT)
Dept: INTERNAL MEDICINE | Facility: CLINIC | Age: 65
End: 2020-02-11

## 2020-02-11 VITALS
HEIGHT: 69 IN | SYSTOLIC BLOOD PRESSURE: 128 MMHG | DIASTOLIC BLOOD PRESSURE: 76 MMHG | BODY MASS INDEX: 26.96 KG/M2 | HEART RATE: 75 BPM | WEIGHT: 182 LBS | OXYGEN SATURATION: 99 %

## 2020-02-11 DIAGNOSIS — G51.0 BELL'S PALSY: Primary | ICD-10-CM

## 2020-02-11 PROCEDURE — 99213 OFFICE O/P EST LOW 20 MIN: CPT | Performed by: NURSE PRACTITIONER

## 2020-02-11 NOTE — PROGRESS NOTES
Chief Complaint   Patient presents with   • Dizziness   • Bell's Palsy   • Nausea       History of Present Illness    Yoseph Granados Jr. is a 64 y.o. male who presents today for follow-up post ED visit where he was diagnosed with bell's palsy.  Patient initially presented to office and was seen by ANAID Campso with concern for nausea, headache, dizziness, facial droop, change in facial sensation, and changes in speech.  Patient was advised to seek emergency medical attention to rule out stroke at that time.  Labs in ED negative for abnormality and MRI negative for evidence of stroke.  It was determined that patient was most likely symptomatic of Bell's palsy.  He was prescribed prednisone and valacyclovir upon discharge and has continued taking medicines as prescribed daily since that time.  He is tolerating medications well, no side effects noted.  Reports was able to sleep through the night for the first time last night.  Denies changes in eyelids and reports left eye has been closing normally.  He does report a tightness sensation around left cheek area but is otherwise able to discern varying sensations.  He reports dizziness and nausea have improved and has only had minor bouts of nausea since discharge.  He is tolerating food and fluids as usual.  He denies changes in bowel or bladder habits, weakness, decreased strength, vision changes, chest pain, palpitations, shortness of breath, or difficulty breathing. He is duong to walk and exercises per his usual.    [facility]    The following portions of the patient's history were reviewed and updated as appropriate: allergies, current medications, past family history, past medical history, past social history, past surgical history and problem list.     Social History     Tobacco Use   • Smoking status: Never Smoker   • Smokeless tobacco: Never Used   Substance Use Topics   • Alcohol use: Yes     Comment: Maybe once a month       Past Medical History:   Diagnosis  Date   • Arthralgia of elbow 7/31/2016    Description: chronic tendonitis of the elbows, ortho -   • Closed comminuted fracture of 2nd toe, intra-articular 7/24/2017    Podiatry - Dr. Velásquez (5/31/17): postop shoe, RTC 4 wks   • Eyelid excess skin 4/23/2018 4/18/18 ophtho/Dr. Aragon - dermatochalasis bilaterally, return for upper lid eval for bleph   • Finger amputation, traumatic 1991    h/o L.index finger amp seconary to bleacher injury, s/p failed surgery   • Foot pain 7/31/2016    Impression: 06/05/2015 - cont with cream per Dr. Davidson; patient currently not interested in surgery  Impression: 04/20/2015 - ongoing eval per Dr. Davidson; exercise modification as above; surgery would be last resort  Impression: 01/20/2015 - rec f/u with Dr. Davidson; Description: secondary to 2nd MTP capsulitis, related to hammertoe (4/15), s/p injection (2/16); podiatry - Dr. Davidson   • Hx of brain MRI 02/09/2020    brain MRI (2/9/20): Mild chronic small vessel ischemic changes, fluid in mastoid cells c/w chronic paranasal sinusitis   • Hx of chest x-ray 02/28/2018    CXR (2/28/18): lenticular opacities at bases sugg of atelectasis   • Hx of chest x-ray 09/16/2019    CXR (9/16/19): chronic changes of atelectasia dn scarring at right hilar and infrahilar region as well as prior healed   • Hx of colonoscopy 06/04/2012    colonosc (6/4/12): polyp, diverticulosis, int hem; GI - Dr. George   • Hx of colonoscopy 07/24/2017    colonosc (7/24/17): polyp; GI - Dr. George   • Hx of exercise stress test 02/11/2015    nl GXT (2/11/15)   • Right hip pain 1/31/2017 9/11/1R. Sciatic, now with proximal hamstring tendinitis, cont hamstring stretches, yoga, and walking exercise; f/u prn flare-up; ortho - Dr. Trevino; 7/7/17 no longer with sciatic sxs, agree with yoga and aquatic exercises, consider MRI L-spine if no better, ortho - Dr. Trevino; 6/1/17 R. Hip pain due to mild degenerative changes by xray, rx PT at [facility] and rtc 6 wks, ortho  - Dr. Trevino; 3/29/17 R   • Shoulder joint pain 2016    Description: chronic tendonitis of the shoulders; ortho -   • Trochanteric bursitis of right hip 2017    s/p injection (17), resolved; ortho - Dr. Trevino       Past Surgical History:   Procedure Laterality Date   • AMPUTATION DIGIT Left      History of Amputated Index Finger DIP Joint(s) secondary to injury at bleachers; s/p failed surgery x2    • BUNIONECTOMY  10/2015    s/p Hudson bunionectomy, 2nd metatarsal osteotomy shortening, arthrodesis (10/15); podiatry - Dr. Davidson   • CARDIAC SURGERY     • COLONOSCOPY      You already have this information   • INGUINAL HERNIA REPAIR     • KNEE SURGERY Left    • METATARSAL OSTEOTOMY      history of buniuon correction with metatarsal osteotomy  s/p Hudson bunionectomty, 2nd metatarsal osteotomy shortening, arthrodesis (10/15);  podiatry - Dr. Davidson       Family History   Problem Relation Age of Onset   • Heart attack Mother    • Diabetes Mother    • Heart disease Mother    • Hypertension Mother    • Heart attack Father    • Aneurysm Father         AAA   • Coronary artery disease Father         CABG   • Heart failure Father    • Arrhythmia Father         pacemaker   • Skin cancer Father    • Valvular heart disease Father    • Hyperlipidemia Sister    • Heart attack Brother    • Hypertension Brother    • Heart attack Cousin         pat cousin  age 44   • Hyperlipidemia Sister    • Hypertension Brother    • Hyperlipidemia Brother        Allergies   Allergen Reactions   • Cosamin Ds [Glucosamine-Chondroitin] Hives   • Erythromycin Unknown (See Comments)   • Naproxen    • Sulfa Antibiotics Hives   • Sulfamethoxazole Unknown (See Comments)   • Trimethoprim Unknown (See Comments)         Current Outpatient Medications:   •  aspirin 81 MG tablet, Take 1 tablet by mouth Daily., Disp: , Rfl:   •  budesonide-formoterol (SYMBICORT) 80-4.5 MCG/ACT inhaler, 2 puffs BID, gargle and spit after puffs, Disp: 1  inhaler, Rfl: 5  •  carboxymethylcellulose (REFRESH PLUS) 0.5 % solution, 3 (Three) Times a Day As Needed for Dry Eyes., Disp: , Rfl:   •  cetirizine (zyrTEC) 10 MG tablet, Take 10 mg by mouth Daily., Disp: , Rfl:   •  Cholecalciferol (VITAMIN D) 2000 UNITS capsule, Take 1 capsule by mouth daily., Disp: , Rfl:   •  Cinnamon 500 MG tablet, Take 1 tablet by mouth daily., Disp: , Rfl:   •  fluticasone (FLONASE) 50 MCG/ACT nasal spray, 2 sprays into the nostril(s) as directed by provider Daily., Disp: 1 bottle, Rfl: 0  •  Fluticasone Furoate (FLONASE SENSIMIST NA), , Disp: , Rfl:   •  ibuprofen (ADVIL,MOTRIN) 600 MG tablet, Take 1 tablet by mouth 2 (two) times a day as needed., Disp: , Rfl:   •  Multiple Vitamins-Minerals (CENTRUM SILVER ADULT 50+ PO), Take 1 tablet by mouth daily., Disp: , Rfl:   •  Multiple Vitamins-Minerals (OCUVITE-LUTEIN PO), PreserVision AREDS-2, Disp: , Rfl:   •  Omega-3 Krill Oil 1000 MG capsule, Take 1 tablet by mouth daily., Disp: , Rfl:   •  predniSONE (DELTASONE) 10 MG tablet, 40mg PO daily for 5 days, then 20mg PO daily for 5 days, then 10mg PO daily for 5 days, Disp: 35 tablet, Rfl: 0  •  Probiotic Product (PROBIOTIC-10 PO), Take  by mouth., Disp: , Rfl:   •  promethazine-dextromethorphan (PROMETHAZINE-DM) 6.25-15 MG/5ML syrup, Take 5 mL by mouth 4 (Four) Times a Day As Needed for Cough., Disp: 240 mL, Rfl: 0  •  Turmeric 500 MG capsule, Take 500 mg by mouth Daily., Disp: , Rfl:   •  valACYclovir (VALTREX) 1000 MG tablet, Take 1 tablet by mouth 2 (Two) Times a Day., Disp: 14 tablet, Rfl: 0    Review of Systems  Review of Systems   Constitutional: Negative for appetite change, chills, diaphoresis, fatigue and fever.   Eyes: Negative for pain, discharge, redness and visual disturbance.   Respiratory: Negative for cough, chest tightness, shortness of breath and wheezing.    Cardiovascular: Negative for chest pain and palpitations.   Gastrointestinal: Positive for nausea. Negative for  "abdominal pain, diarrhea and vomiting.   Musculoskeletal: Negative for gait problem.   Skin: Negative for color change, rash and wound.   Neurological: Positive for dizziness and numbness. Negative for tremors, syncope, speech difficulty, weakness, light-headedness and headaches.   Psychiatric/Behavioral: Negative for agitation, confusion and sleep disturbance.       Vitals:  Vitals:    02/11/20 1506   BP: 128/76   Pulse: 75   SpO2: 99%   Weight: 82.6 kg (182 lb)   Height: 174 cm (68.5\")   PainSc: 0-No pain       Physical Exam  Physical Exam   Constitutional: He is oriented to person, place, and time. Vital signs are normal. He appears well-developed and well-nourished.   HENT:   Head: Normocephalic and atraumatic.   Mouth/Throat: Uvula is midline.   Eyes: Pupils are equal, round, and reactive to light. Conjunctivae, EOM and lids are normal.   Neck: Normal range of motion. Neck supple. Carotid bruit is not present.   Cardiovascular: Normal rate, regular rhythm and normal heart sounds.   Pulses:       Carotid pulses are 2+ on the right side, and 2+ on the left side.       Radial pulses are 2+ on the right side, and 2+ on the left side.        Dorsalis pedis pulses are 2+ on the right side, and 2+ on the left side.   Pulmonary/Chest: Effort normal and breath sounds normal. He has no decreased breath sounds. He has no wheezes. He has no rhonchi. He has no rales.   Musculoskeletal: Normal range of motion.   Neurological: He is alert and oriented to person, place, and time. He has normal strength. No cranial nerve deficit (No cranial nerve deficits noted upon exam) or sensory deficit (via pinprick test). He exhibits normal muscle tone. Coordination and gait normal. GCS eye subscore is 4. GCS verbal subscore is 5. GCS motor subscore is 6.   Skin: Skin is warm, dry and intact. No rash noted.   Psychiatric: He has a normal mood and affect. His behavior is normal.   Nursing note and vitals reviewed.      Labs  Results for " orders placed or performed during the hospital encounter of 02/09/20   Comprehensive Metabolic Panel   Result Value Ref Range    Glucose 177 (H) 65 - 99 mg/dL    BUN 19 8 - 23 mg/dL    Creatinine 0.90 0.76 - 1.27 mg/dL    Sodium 140 136 - 145 mmol/L    Potassium 4.0 3.5 - 5.2 mmol/L    Chloride 104 98 - 107 mmol/L    CO2 27.0 22.0 - 29.0 mmol/L    Calcium 9.0 8.6 - 10.5 mg/dL    Total Protein 6.7 6.0 - 8.5 g/dL    Albumin 4.50 3.50 - 5.20 g/dL    ALT (SGPT) 18 1 - 41 U/L    AST (SGOT) 17 1 - 40 U/L    Alkaline Phosphatase 71 39 - 117 U/L    Total Bilirubin 0.5 0.2 - 1.2 mg/dL    eGFR Non African Amer 84 >60 mL/min/1.73    Globulin 2.2 gm/dL    A/G Ratio 2.0 g/dL    BUN/Creatinine Ratio 21.1 7.0 - 25.0    Anion Gap 9.0 5.0 - 15.0 mmol/L   Troponin   Result Value Ref Range    Troponin T <0.010 0.000 - 0.030 ng/mL   Magnesium   Result Value Ref Range    Magnesium 2.1 1.6 - 2.4 mg/dL   CBC Auto Differential   Result Value Ref Range    WBC 12.14 (H) 3.40 - 10.80 10*3/mm3    RBC 4.82 4.14 - 5.80 10*6/mm3    Hemoglobin 15.2 13.0 - 17.7 g/dL    Hematocrit 45.1 37.5 - 51.0 %    MCV 93.6 79.0 - 97.0 fL    MCH 31.5 26.6 - 33.0 pg    MCHC 33.7 31.5 - 35.7 g/dL    RDW 12.2 (L) 12.3 - 15.4 %    RDW-SD 41.7 37.0 - 54.0 fl    MPV 11.0 6.0 - 12.0 fL    Platelets 165 140 - 450 10*3/mm3    Neutrophil % 91.9 (H) 42.7 - 76.0 %    Lymphocyte % 4.4 (L) 19.6 - 45.3 %    Monocyte % 3.0 (L) 5.0 - 12.0 %    Eosinophil % 0.1 (L) 0.3 - 6.2 %    Basophil % 0.2 0.0 - 1.5 %    Immature Grans % 0.4 0.0 - 0.5 %    Neutrophils, Absolute 11.14 (H) 1.70 - 7.00 10*3/mm3    Lymphocytes, Absolute 0.54 (L) 0.70 - 3.10 10*3/mm3    Monocytes, Absolute 0.37 0.10 - 0.90 10*3/mm3    Eosinophils, Absolute 0.01 0.00 - 0.40 10*3/mm3    Basophils, Absolute 0.03 0.00 - 0.20 10*3/mm3    Immature Grans, Absolute 0.05 0.00 - 0.05 10*3/mm3    nRBC 0.0 0.0 - 0.2 /100 WBC   Light Blue Top   Result Value Ref Range    Extra Tube hold for add-on    Green Top (Gel)   Result  Value Ref Range    Extra Tube Hold for add-ons.    Lavender Top   Result Value Ref Range    Extra Tube hold for add-on    Gold Top - SST   Result Value Ref Range    Extra Tube Hold for add-ons.        Assessment/Plan    Yoseph was seen today for dizziness, bell's palsy and nausea.    Diagnoses and all orders for this visit:    Bell's palsy    Patient advised to continue medication management as previously prescribed from ED.  Addressed concerns related to timing of medication as well as continuance of physical activity.  Discussed signs and symptoms of Bell's palsy as well as expected timeframe for improvement and referral to neurology for prolonged or worsening symptoms.  Patient advised to continue to monitor for neurological deficits and seek emergency medical attention should he develop worsening symptoms including change in facial symmetry, weakness, speech, headache, or altered sensation.  Follow-up with PCP per previously scheduled appointment for recheck.    Plan of care reviewed with patient at conclusion of today's visit. Patient education was provided regarding diagnosis, management, and prescribed or recommended OTC medications. Patient was informed to notify office of any new, worsening, or persistent symptoms. Patient verbalized understanding and agreement with plan of care.     Follow-Up  Return in about 3 months (around 4/30/2020), or if symptoms worsen or fail to improve, for Recheck.      Electronically Signed By:  ANAID Rodriguez      EMR Dragon/Transcription Disclaimer:  Much of this encounter note is an electronic transcription/translation of spoken language to printed text.  The electronic translation of spoken language may permit erroneous, or at times, nonsensical words or phrases to be inadvertently transcribed.  Although I have reviewed the note for such errors, some may still exist.

## 2020-02-11 NOTE — PATIENT INSTRUCTIONS
Bell Palsy, Adult    Bell palsy is a short-term inability to move muscles in part of the face. The inability to move (paralysis) results from inflammation or compression of the facial nerve, which travels along the skull and under the ear to the side of the face (7th cranial nerve). This nerve is responsible for facial movements that include blinking, closing the eyes, smiling, and frowning.  What are the causes?  The exact cause of this condition is not known. It may be caused by an infection from a virus, such as the chickenpox (herpes zoster), Dony-Barr, or mumps virus.  What increases the risk?  You are more likely to develop this condition if:  · You are pregnant.  · You have diabetes.  · You have had a recent infection in your nose, throat, or airways (upper respiratory infection).  · You have a weakened body defense system (immune system).  · You have had a facial injury, such as a fracture.  · You have a family history of Bell palsy.  What are the signs or symptoms?  Symptoms of this condition include:  · Weakness on one side of the face.  · Drooping eyelid and corner of the mouth.  · Excessive tearing in one eye.  · Difficulty closing the eyelid.  · Dry eye.  · Drooling.  · Dry mouth.  · Changes in taste.  · Change in facial appearance.  · Pain behind one ear.  · Ringing in one or both ears.  · Sensitivity to sound in one ear.  · Facial twitching.  · Headache.  · Impaired speech.  · Dizziness.  · Difficulty eating or drinking.  Most of the time, only one side of the face is affected. Rarely, Bell palsy affects the whole face.  How is this diagnosed?  This condition is diagnosed based on:  · Your symptoms.  · Your medical history.  · A physical exam.  You may also have to see health care providers who specialize in disorders of the nerves (neurologist) or diseases and conditions of the eye (ophthalmologist). You may have tests, such as:  · A test to check for nerve damage (electromyogram).  · Imaging  studies, such as CT or MRI scans.  · Blood tests.  How is this treated?  This condition affects every person differently. Sometimes symptoms go away without treatment within a couple weeks. If treatment is needed, it varies from person to person. The goal of treatment is to reduce inflammation and protect the eye from damage. Treatment for Bell palsy may include:  · Medicines, such as:  ? Steroids to reduce swelling and inflammation.  ? Antiviral drugs.  ? Pain relievers, including aspirin, acetaminophen, or ibuprofen.  · Eye drops or ointment to keep your eye moist.  · Eye protection, if you cannot close your eye.  · Exercises or massage to regain muscle strength and function (physical therapy).  Follow these instructions at home:    · Take over-the-counter and prescription medicines only as told by your health care provider.  · If your eye is affected:  ? Keep your eye moist with eye drops or ointment as told by your health care provider.  ? Follow instructions for eye care and protection as told by your health care provider.  · Do any physical therapy exercises as told by your health care provider.  · Keep all follow-up visits as told by your health care provider. This is important.  Contact a health care provider if:  · You have a fever.  · Your symptoms do not get better within 2-3 weeks, or your symptoms get worse.  · Your eye is red, irritated, or painful.  · You have new symptoms.  Get help right away if:  · You have weakness or numbness in a part of your body other than your face.  · You have trouble swallowing.  · You develop neck pain or stiffness.  · You develop dizziness or shortness of breath.  Summary  · Bell palsy is a short-term inability to move muscles in part of the face. The inability to move (paralysis) results from inflammation or compression of the facial nerve.  · This condition affects every person differently. Sometimes symptoms go away without treatment within a couple weeks.  · If  treatment is needed, it varies from person to person. The goal of treatment is to reduce inflammation and protect the eye from damage.  · Contact your health care provider if your symptoms do not get better within 2-3 weeks, or your symptoms get worse.  This information is not intended to replace advice given to you by your health care provider. Make sure you discuss any questions you have with your health care provider.  Document Released: 12/18/2006 Document Revised: 11/16/2018 Document Reviewed: 02/20/2018  Elsevier Interactive Patient Education © 2019 [employer]

## 2020-02-19 ENCOUNTER — PATIENT OUTREACH (OUTPATIENT)
Dept: CASE MANAGEMENT | Facility: OTHER | Age: 65
End: 2020-02-19

## 2020-02-19 NOTE — OUTREACH NOTE
Patient Outreach Note    Documenting patient UTRX4.  Patient had ED visit at [facility] on 02/09/2020.  Patient presented with c/o dizziness and facial droop.  Clinical impression: Left-sided Bell's Palsy.  Patient has followed with PCP post discharged.  Patient is current with [facility] and MYChart is active.  Patient is up to date with most care gaps.  No further HRCM outreach scheduled.     Cara Alvarado RN  Ambulatory     2/19/2020, 4:46 PM

## 2020-04-09 ENCOUNTER — TELEPHONE (OUTPATIENT)
Dept: INTERNAL MEDICINE | Facility: CLINIC | Age: 65
End: 2020-04-09

## 2020-04-09 NOTE — TELEPHONE ENCOUNTER
Left message for patient to reschedule appointment out for 1 month or change to a video visit. He can also come in for labs before his appointment

## 2020-04-21 PROBLEM — M65.342 TRIGGER RING FINGER OF LEFT HAND: Status: ACTIVE | Noted: 2020-04-21

## 2020-05-21 ENCOUNTER — LAB REQUISITION (OUTPATIENT)
Dept: LAB | Facility: HOSPITAL | Age: 65
End: 2020-05-21

## 2020-05-21 DIAGNOSIS — Z00.00 ROUTINE GENERAL MEDICAL EXAMINATION AT A HEALTH CARE FACILITY: ICD-10-CM

## 2020-05-21 PROCEDURE — 36415 COLL VENOUS BLD VENIPUNCTURE: CPT

## 2020-05-22 LAB
HBA1C MFR BLD: 5.5 % (ref 4.8–5.6)
PSA FREE MFR SERPL: 22.5 %
PSA FREE SERPL-MCNC: 0.18 NG/ML
PSA SERPL-MCNC: 0.8 NG/ML (ref 0–4)

## 2020-05-26 NOTE — ASSESSMENT & PLAN NOTE
BG control stable with A1C 5.5; encouraged reg phys activity to decr insulin resistance, moderation in unhealthy starches/sweets; f/u A1C in 6 mos

## 2020-05-26 NOTE — ASSESSMENT & PLAN NOTE
Reassurance that PSA is back down to 0.8, with acceptable free/total PSA ratio; repeat again with next wellness

## 2020-05-26 NOTE — ASSESSMENT & PLAN NOTE
Repeat BP remains elevated; no meds currently; rec low Na diet, increased aerobic exercise; home BP monitoring with goal BP < 130/80, f/u earlier than wellness if consistently > 140/90

## 2020-05-28 ENCOUNTER — OFFICE VISIT (OUTPATIENT)
Dept: INTERNAL MEDICINE | Facility: CLINIC | Age: 65
End: 2020-05-28

## 2020-05-28 VITALS
BODY MASS INDEX: 26.61 KG/M2 | OXYGEN SATURATION: 98 % | WEIGHT: 177.6 LBS | SYSTOLIC BLOOD PRESSURE: 140 MMHG | HEART RATE: 78 BPM | DIASTOLIC BLOOD PRESSURE: 72 MMHG

## 2020-05-28 DIAGNOSIS — J30.2 SEASONAL ALLERGIC RHINITIS, UNSPECIFIED TRIGGER: ICD-10-CM

## 2020-05-28 DIAGNOSIS — M65.342 TRIGGER RING FINGER OF LEFT HAND: ICD-10-CM

## 2020-05-28 DIAGNOSIS — Z00.00 MEDICARE ANNUAL WELLNESS VISIT, INITIAL: ICD-10-CM

## 2020-05-28 DIAGNOSIS — I10 ESSENTIAL HYPERTENSION: ICD-10-CM

## 2020-05-28 DIAGNOSIS — Z12.5 SCREENING PSA (PROSTATE SPECIFIC ANTIGEN): ICD-10-CM

## 2020-05-28 DIAGNOSIS — J45.41 MODERATE PERSISTENT ASTHMATIC BRONCHITIS WITH ACUTE EXACERBATION: ICD-10-CM

## 2020-05-28 DIAGNOSIS — R97.20 INCREASED PROSTATE SPECIFIC ANTIGEN (PSA) VELOCITY: ICD-10-CM

## 2020-05-28 DIAGNOSIS — R73.01 IMPAIRED FASTING GLUCOSE: Primary | ICD-10-CM

## 2020-05-28 PROCEDURE — 99214 OFFICE O/P EST MOD 30 MIN: CPT | Performed by: INTERNAL MEDICINE

## 2020-05-28 RX ORDER — FLUTICASONE PROPIONATE 50 MCG
2 SPRAY, SUSPENSION (ML) NASAL DAILY
Qty: 3 BOTTLE | Refills: 1 | Status: SHIPPED | OUTPATIENT
Start: 2020-05-28 | End: 2021-02-09 | Stop reason: SDUPTHER

## 2020-05-28 NOTE — ASSESSMENT & PLAN NOTE
asx on symbicort 80/4.5 2 puffs BID; plan for PFTs with next wellness unless COVID-19 restrictions

## 2020-05-28 NOTE — ASSESSMENT & PLAN NOTE
Left ring finger with some palmar pain but no contracture and no restrictions currently; recommend massage, warm compresses, stretching, and opening up finger when it is triggered; follow-up if functional deficits and would refer to hand Ortho at that time

## 2020-05-28 NOTE — PROGRESS NOTES
"Chief Complaint   Patient presents with   • Impaired fasting glucose   • Hypertension   • Elevated PSA       History of Present Illness  65 y.o.  gentleman presents for follow-up on sugars, PSA, BP, osteoarthritis of the hands. Has some pain in the palm of the left hand.  Sometimes the left fourth finger gets stuck and he has to pry it open.  Does not have any functional issues meaning that he has normal strength and normal range of motion at this time.  Does not have significant joint symptoms in the fingers currently.    Has not been checking BG's.  Has been active but not using his \"go nowhere bicycle\".  Maintaining social distancing precautions; son brings the groceries to them.    Reports Flonase is covered by insurance and therefore is requesting prescription.  Has been consistent with Symbicort especially with COVID pandemic.  Denies any respiratory symptoms or concerns.    Has been taking antibiotics for teeth/jaw infection.  Has final stages of implant pending.  Has taken concomitant probiotic and notes no GI symptoms with current antibiotics.    Review of Systems  Denies headaches, chest pain, palpitations, shortness of breath.  Has left fourth trigger finger symptoms as noted above.  No falls or injuries.  All other ROS reviewed and negative.    [facility]  The following portions of the patient's history were reviewed and updated as appropriate: allergies, current medications, past family history, past medical history, past social history, past surgical history and problem list.      Current Outpatient Medications:   •  aspirin 81 MG QD  •  budesonide-formoterol (SYMBICORT) 80-4.5 MCG/ACT 2 puffs BID  •  carboxymethylcellulose (REFRESH PLUS) 0.5 % solution, TID prn dry eyes  •  cetirizine (zyrTEC) 10 MG tQD  •  Cholecalciferol (VITAMIN D) 2000 UNITS QD  •  Cinnamon 500 MG QD  •  Fluticasone Furoate (FLONASE SENSIMIST NA) AD  •  ibuprofen (ADVIL,MOTRIN) 600 MG tablet bid prn  •  Multiple " Vitamins-Minerals (CENTRUM SILVER ADULT 50+ PO) QD  •  Omega-3 Krill Oil 1000 MG QD  •  Probiotic Product (PROBIOTIC-10 PO), TakQD  •  Turmeric 500 MG QD      VITALS:  /72   Pulse 78   Wt 80.6 kg (177 lb 9.6 oz)   SpO2 98%   BMI 26.61 kg/m²   Repeat BP left arm 146/74    Physical Exam   Constitutional: He is oriented to person, place, and time. He appears well-developed and well-nourished.   Eyes: Conjunctivae and EOM are normal.   Wears glasses   Cardiovascular: Normal rate, regular rhythm and normal heart sounds.   Pulmonary/Chest: Effort normal and breath sounds normal. No respiratory distress.   Abdominal: Soft. Bowel sounds are normal.   Musculoskeletal: He exhibits tenderness (Tenderness in the left palm at the left carpal tendon, no associated thickness; full range of motion, no contracture currently).   Neurological: He is alert and oriented to person, place, and time. No sensory deficit.   Psychiatric: He has a normal mood and affect. His behavior is normal.   Nursing note and vitals reviewed.      LABS  5/21/2020  PSA  0.0 - 4.0 ng/mL 0.8    Comment: Roche ECLIA methodology.   According to the American Urological Association, Serum PSA should   decrease and remain at undetectable levels after radical   prostatectomy. The AUA defines biochemical recurrence as an initial   PSA value 0.2 ng/mL or greater followed by a subsequent confirmatory   PSA value 0.2 ng/mL or greater.   Values obtained with different assay methods or kits cannot be used   interchangeably. Results cannot be interpreted as absolute evidence   of the presence or absence of malignant disease.    PSA, Free  N/A ng/mL 0.18    Comment: Roche ECLIA methodology.   PSA, Free %  % 22.5    Comment: The table below lists      Hemoglobin A1C  4.80 - 5.60 % 5.50        10/19 A1c 5.6, PSA 1.41    ASSESSMENT/PLAN  Problem List Items Addressed This Visit        Cardiovascular and Mediastinum    Hypertension     Repeat BP remains elevated; no  meds currently; rec low Na diet, increased aerobic exercise; home BP monitoring with goal BP < 130/80, f/u earlier than wellness if consistently > 140/90              Respiratory    Asthmatic bronchitis     asx on symbicort 80/4.5 2 puffs BID; plan for PFTs with next wellness unless COVID-19 restrictions           Relevant Medications    fluticasone (FLONASE) 50 MCG/ACT nasal spray    Allergic rhinitis     RX flonase #3, 1RF         Relevant Medications    fluticasone (FLONASE) 50 MCG/ACT nasal spray       Endocrine    Impaired fasting glucose - Primary     BG control stable with A1C 5.5; encouraged reg phys activity to decr insulin resistance, moderation in unhealthy starches/sweets; f/u A1C in 6 mos              Musculoskeletal and Integument    Trigger ring finger of left hand     Left ring finger with some palmar pain but no contracture and no restrictions currently; recommend massage, warm compresses, stretching, and opening up finger when it is triggered; follow-up if functional deficits and would refer to hand Ortho at that time            Other    Increased prostate specific antigen (PSA) velocity     Reassurance that PSA is back down to 0.8, with acceptable free/total PSA ratio; repeat again with next wellness               FOLLOW-UP  RTC for [facility]/[employer] wellness 10/29/20; fasting labs prior to appt (CBC, CMP, TSH, lipids, UA/micro, PSA, A1C, microalb) +PFTs; also f/u BP    Electronically signed by:    Gina Adamson MD, FACP  05/28/2020

## 2020-10-22 ENCOUNTER — LAB REQUISITION (OUTPATIENT)
Dept: LAB | Facility: HOSPITAL | Age: 65
End: 2020-10-22

## 2020-10-22 DIAGNOSIS — Z00.00 ROUTINE GENERAL MEDICAL EXAMINATION AT A HEALTH CARE FACILITY: ICD-10-CM

## 2020-10-22 LAB
ALBUMIN SERPL-MCNC: 4.8 G/DL (ref 3.5–5.2)
ALBUMIN/GLOB SERPL: 3.2 G/DL
ALP SERPL-CCNC: 75 U/L (ref 39–117)
ALT SERPL-CCNC: 17 U/L (ref 1–41)
AST SERPL-CCNC: 18 U/L (ref 1–40)
BASOPHILS # BLD AUTO: 0.03 10*3/MM3 (ref 0–0.2)
BASOPHILS NFR BLD AUTO: 0.5 % (ref 0–1.5)
BILIRUB SERPL-MCNC: 0.5 MG/DL (ref 0–1.2)
BUN SERPL-MCNC: 17 MG/DL (ref 8–23)
BUN/CREAT SERPL: 16.8 (ref 7–25)
CALCIUM SERPL-MCNC: 9.3 MG/DL (ref 8.6–10.5)
CHLORIDE SERPL-SCNC: 104 MMOL/L (ref 98–107)
CHOLEST SERPL-MCNC: 196 MG/DL (ref 0–200)
CO2 SERPL-SCNC: 30.2 MMOL/L (ref 22–29)
CREAT SERPL-MCNC: 1.01 MG/DL (ref 0.76–1.27)
EOSINOPHIL # BLD AUTO: 0.14 10*3/MM3 (ref 0–0.4)
EOSINOPHIL NFR BLD AUTO: 2.2 % (ref 0.3–6.2)
ERYTHROCYTE [DISTWIDTH] IN BLOOD BY AUTOMATED COUNT: 12.5 % (ref 12.3–15.4)
GLOBULIN SER CALC-MCNC: 1.5 GM/DL
GLUCOSE SERPL-MCNC: 96 MG/DL (ref 65–99)
HBA1C MFR BLD: 5.6 % (ref 4.8–5.6)
HCT VFR BLD AUTO: 49.4 % (ref 37.5–51)
HDLC SERPL-MCNC: 52 MG/DL (ref 40–60)
HGB BLD-MCNC: 16.4 G/DL (ref 13–17.7)
IMM GRANULOCYTES # BLD AUTO: 0.01 10*3/MM3 (ref 0–0.05)
IMM GRANULOCYTES NFR BLD AUTO: 0.2 % (ref 0–0.5)
LDLC SERPL CALC-MCNC: 114 MG/DL (ref 0–100)
LYMPHOCYTES # BLD AUTO: 1.34 10*3/MM3 (ref 0.7–3.1)
LYMPHOCYTES NFR BLD AUTO: 20.8 % (ref 19.6–45.3)
MCH RBC QN AUTO: 31.1 PG (ref 26.6–33)
MCHC RBC AUTO-ENTMCNC: 33.2 G/DL (ref 31.5–35.7)
MCV RBC AUTO: 93.6 FL (ref 79–97)
MONOCYTES # BLD AUTO: 0.54 10*3/MM3 (ref 0.1–0.9)
MONOCYTES NFR BLD AUTO: 8.4 % (ref 5–12)
NEUTROPHILS # BLD AUTO: 4.39 10*3/MM3 (ref 1.7–7)
NEUTROPHILS NFR BLD AUTO: 67.9 % (ref 42.7–76)
NRBC BLD AUTO-RTO: 0 /100 WBC (ref 0–0.2)
PLATELET # BLD AUTO: 170 10*3/MM3 (ref 140–450)
POTASSIUM SERPL-SCNC: 4.9 MMOL/L (ref 3.5–5.2)
PROT SERPL-MCNC: 6.3 G/DL (ref 6–8.5)
PSA SERPL-MCNC: 0.51 NG/ML (ref 0–4)
RBC # BLD AUTO: 5.28 10*6/MM3 (ref 4.14–5.8)
SODIUM SERPL-SCNC: 143 MMOL/L (ref 136–145)
TRIGL SERPL-MCNC: 172 MG/DL (ref 0–150)
TSH SERPL DL<=0.005 MIU/L-ACNC: 3.87 UIU/ML (ref 0.27–4.2)
VLDLC SERPL CALC-MCNC: 30 MG/DL (ref 5–40)
WBC # BLD AUTO: 6.45 10*3/MM3 (ref 3.4–10.8)

## 2020-10-22 PROCEDURE — 36415 COLL VENOUS BLD VENIPUNCTURE: CPT | Performed by: INTERNAL MEDICINE

## 2020-10-23 LAB
ALBUMIN/CREAT UR: <6 MG/G CREAT (ref 0–29)
APPEARANCE UR: CLEAR
BACTERIA #/AREA URNS HPF: NORMAL /HPF
BILIRUB UR QL STRIP: NEGATIVE
COLOR UR: NORMAL
CREAT UR-MCNC: 48.1 MG/DL
EPI CELLS #/AREA URNS HPF: NORMAL /HPF
GLUCOSE UR QL: NEGATIVE
HGB UR QL STRIP: NEGATIVE
KETONES UR QL STRIP: NEGATIVE
LEUKOCYTE ESTERASE UR QL STRIP: NEGATIVE
MICROALBUMIN UR-MCNC: <3 UG/ML
NITRITE UR QL STRIP: NEGATIVE
PH UR STRIP: 7 [PH] (ref 5–8)
PROT UR QL STRIP: NEGATIVE
RBC #/AREA URNS HPF: NORMAL /HPF
SP GR UR: 1.02 (ref 1–1.03)
UROBILINOGEN UR STRIP-MCNC: NORMAL MG/DL
WBC #/AREA URNS HPF: NORMAL /HPF

## 2020-10-26 NOTE — ASSESSMENT & PLAN NOTE
BG control stable/good with A1C 5.6; encouraged reg phys activity to decr insulin resistance, moderation in unhealthy starches/sweets; f/u A1C in 6 mos

## 2020-10-26 NOTE — ASSESSMENT & PLAN NOTE
Health maintenance - flu vacc done 9/20; Prevnar 8/18; PVX 10/19; Tdap 9/13 (next Td due 2023), Zostavax 12/16; HAV and Shingrix done; colonosc 7/17, repeat 2022 with Dr. George; PSA stable 0.512; eye exam with Dr. Aragon TBD (glasses); dental exam UTD (s/p implants); (+) seat belt use    Consultants:  Patient Care Team:  Gina Admason MD as PCP - General  [facility], Franny Alegria MD as Consulting Physician (Dermatology)  Erwin George MD as Consulting Physician (Gastroenterology)  Yoseph Aragon MD as Consulting Physician (Ophthalmology)  Paramjit Trevino MD as Consulting Physician (Orthopedic Surgery)  Remington Eller DPM as Consulting Physician (Podiatry)

## 2020-10-26 NOTE — ASSESSMENT & PLAN NOTE
BP elevated even on repeat but he reports normotensive readings at home; no meds; rec low Na diet, increased aerobic exercise; continue home BP monitoring with goal < 130/80; f/u if home BPs increase, o/w f/u in 6 mos at the latest

## 2020-10-29 ENCOUNTER — OFFICE VISIT (OUTPATIENT)
Dept: INTERNAL MEDICINE | Facility: CLINIC | Age: 65
End: 2020-10-29

## 2020-10-29 VITALS
SYSTOLIC BLOOD PRESSURE: 144 MMHG | WEIGHT: 176 LBS | BODY MASS INDEX: 26.07 KG/M2 | DIASTOLIC BLOOD PRESSURE: 78 MMHG | OXYGEN SATURATION: 99 % | HEIGHT: 69 IN | HEART RATE: 76 BPM

## 2020-10-29 DIAGNOSIS — Z00.00 MEDICARE ANNUAL WELLNESS VISIT, SUBSEQUENT: Primary | ICD-10-CM

## 2020-10-29 DIAGNOSIS — R73.01 IMPAIRED FASTING GLUCOSE: ICD-10-CM

## 2020-10-29 DIAGNOSIS — J45.40 MODERATE PERSISTENT ASTHMATIC BRONCHITIS WITHOUT COMPLICATION: ICD-10-CM

## 2020-10-29 DIAGNOSIS — D17.1 LIPOMA OF TORSO: ICD-10-CM

## 2020-10-29 DIAGNOSIS — E78.5 DYSLIPIDEMIA: ICD-10-CM

## 2020-10-29 DIAGNOSIS — I10 ESSENTIAL HYPERTENSION: ICD-10-CM

## 2020-10-29 DIAGNOSIS — S61.211A LACERATION OF LEFT INDEX FINGER WITHOUT FOREIGN BODY WITHOUT DAMAGE TO NAIL, INITIAL ENCOUNTER: ICD-10-CM

## 2020-10-29 PROCEDURE — 93000 ELECTROCARDIOGRAM COMPLETE: CPT | Performed by: INTERNAL MEDICINE

## 2020-10-29 PROCEDURE — 99397 PER PM REEVAL EST PAT 65+ YR: CPT | Performed by: INTERNAL MEDICINE

## 2020-10-29 PROCEDURE — 99497 ADVNCD CARE PLAN 30 MIN: CPT | Performed by: INTERNAL MEDICINE

## 2020-10-29 PROCEDURE — 96160 PT-FOCUSED HLTH RISK ASSMT: CPT | Performed by: INTERNAL MEDICINE

## 2020-10-29 PROCEDURE — G0444 DEPRESSION SCREEN ANNUAL: HCPCS | Performed by: INTERNAL MEDICINE

## 2020-10-29 PROCEDURE — G0439 PPPS, SUBSEQ VISIT: HCPCS | Performed by: INTERNAL MEDICINE

## 2020-10-29 RX ORDER — BUDESONIDE AND FORMOTEROL FUMARATE DIHYDRATE 80; 4.5 UG/1; UG/1
AEROSOL RESPIRATORY (INHALATION)
Qty: 3 INHALER | Refills: 3 | Status: SHIPPED | OUTPATIENT
Start: 2020-10-29 | End: 2021-11-06 | Stop reason: SDUPTHER

## 2020-10-29 NOTE — PROGRESS NOTES
ANNUAL WELLNESS VISIT    DRUG AND ALCOHOL USE      no tobacco use, alcohol intake:1 drink or less per week and caffeine intake: 1 cups of half caff coffee per day    DIET AND PHYSICAL ACTIVITY     Diet: general    Exercise: frequently   Exercise Details: walking - has been walking 5-6 miles 3-4x/week    MOOD DISORDER AND COGNITIVE SCREENING   Depression Screening Tool Used yes - see PHQ-9; components reviewed with patient; 15-min counseling done - denies feeling down, depressed, hopeless or not having pleasure in doing things. Trying to stay active and has been regularly walking 3-4x/wk. Denies concerns with sleep, energy, appetite, concentration, slow movement, slow speech, negative thoughts. Screening is negative for depression.   Anxiety Screening Tool Used yes     Mini-Cog Performed   Yes    1. Tell Patient 3 Words apple table dinesh    2. Administer Clock Test normal    3. Recall 3 words  apple table dinesh    4. Number Correct Items 3    FUNCTIONAL ABILITY AND LEVEL OF SAFETY   Hearing no hearing loss     Wears Hearing Aids No       Current Activities Independent      none  - see Funct/Cog Status Intake     Fall Risk Assessment       Has difficulty with walking or balance  No         Timed Up and Go (TUG) Test  8 sec.       If >12 sec, normal    ADVANCED DIRECTIVE  Advance Care Planning   ACP discussion was held with the patient during this visit. Patient does not have an advance directive, information provided.  Advance Care Planning Discussion:  16 min or more spent on counseling; patient does not have advanced directive and living will.  Reviewed desires for end of life care, which is to have comfort care.  Patient does not want extraordinary life-sustaining measures, including prolonged artificial life support.  Reviewed code status options, meanings, desires. Patient 's code status is Full Code.   Encouraged patient to ensure family is aware of desires/preferences. Will consult ACP team to assist with  paperwork.      PAIN SCREENING Do you have pain right now? yes      If so, 1-10 scale: 3     Constant     Do you have pain every day? Yes      Probable chronic pain: Yes     Recent Hospitalizations:  Recently treated at the following:  [facility].     MEDICATION REVIEW   - updated and reviewed (see Medication List).   - reviewed for potentially harmful drug-disease interactions in the elderly.   - reviewed for high risk medications in the elderly.   - aspirin use: Yes    BMI  Body mass index is 26.37 kg/m².    Patient's Body mass index is 26.37 kg/m². BMI is above normal parameters. Recommendations include: educational material and nutrition counseling.    _________________________________________________________    Chief Complaint   Patient presents with   • Medicare Wellness-subsequent   • Hypertension       History of Present Illness  65 y.o.  gentleman presents for updated phys examination and wellness visit. Feels overall well, noting he has been regularly physically active, walking 3-4x/week, 5-6 miles at a time.     Home BP readings have been 110s/60s.     Reports ongoing dental issues with implants.    Cut left index finger on  blade 3 days ago. Continues to bleed, but decreased. That finger was previously amputated. Mild assoc'd pain; patient is right-handed.    Has 2 bx sites on his chest per derm.    Review of Systems  Denies headaches, visual changes, CP, palpitations, SOB, cough, abd pain, n/v/d, difficulty with urination, numbness/tingling, falls, mood changes, lightheadedness, hearing changes, rashes.     All other ROS reviewed and negative.    [facility]  The following portions of the patient's history were reviewed and updated as appropriate: allergies, current medications, past family history, past medical history, past social history, past surgical history and problem list.      Current Outpatient Medications:   •  aspirin 81 MG QD  •  budesonide-formoterol  "(SYMBICORT) 80-4.5 MCG/ACT inhaler, 2 puffs BID  •  carboxymethylcellulose (REFRESH PLUS) 0.5 % solution TID  •  cetirizine (zyrTEC) 10 MG QD  •  CHERRY PO QD  •  Cholecalciferol (VITAMIN D) 2000 UNITS QD  •  Cinnamon 500 MG QD  •  fluticasone (FLONASE) 50 MCG/ACT nasal spray AD  •  ibuprofen (ADVIL,MOTRIN) 600 MG tablet bid prn:   •  CENTRUM SILVER ADULT 50+ PO QD  •  Omega-3 Krill Oil 1000 MG QD  •  Probiotic QD  •  Turmeric 500 MG QD      VITALS:  /78   Pulse 76   Ht 174 cm (68.5\")   Wt 79.8 kg (176 lb)   SpO2 99%   BMI 26.37 kg/m²   Repeat BP left arm 150/64    Physical Exam  Vitals signs and nursing note reviewed.   Constitutional:       General: He is not in acute distress.     Appearance: Normal appearance. He is well-developed.   HENT:      Head: Normocephalic.      Right Ear: Ear canal and external ear normal.      Left Ear: Ear canal and external ear normal.      Nose: Nose normal.   Eyes:      Extraocular Movements: Extraocular movements intact.      Conjunctiva/sclera: Conjunctivae normal.      Pupils: Pupils are equal, round, and reactive to light.      Comments: Medial deviation left eye (baseline); wears glasses   Neck:      Musculoskeletal: Normal range of motion and neck supple.      Vascular: No carotid bruit (bilaterally).   Cardiovascular:      Rate and Rhythm: Normal rate and regular rhythm.      Heart sounds: Normal heart sounds.   Pulmonary:      Effort: Pulmonary effort is normal. No respiratory distress.      Breath sounds: Normal breath sounds. No wheezing or rales.   Abdominal:      General: Bowel sounds are normal. There is no distension.      Palpations: Abdomen is soft. There is no mass.      Tenderness: There is no abdominal tenderness.      Comments: Multiple SQ well-circ smooth nodules on both sides of the abdomen, nontender   Genitourinary:     Comments: Chaperone declined by patient; normal external genitalia, noninflamed ext hemorrhoids, prostate smooth, minimally " enlarged, nontender, no nodules palpated    Musculoskeletal:      Right lower leg: No edema.      Left lower leg: No edema.   Lymphadenopathy:      Cervical: No cervical adenopathy.   Skin:     General: Skin is warm and dry.      Findings: No rash.      Comments: Ventral middle phalanx left index finger (because distal segment amputated) with 1cm well-apposed laceration without surr erythema, no active bleeding, mildly tender   Neurological:      Mental Status: He is alert and oriented to person, place, and time.      Cranial Nerves: No cranial nerve deficit.      Deep Tendon Reflexes: Reflexes normal.   Psychiatric:         Mood and Affect: Mood normal.         Behavior: Behavior normal.         LABS  Results for orders placed or performed in visit on 05/28/20   Hemoglobin A1c    Specimen: Blood    BLOOD  MANUAL DIFFEREN   Result Value Ref Range    Hemoglobin A1C 5.60 4.80 - 5.60 %   PSA Screen    Specimen: Blood    BLOOD  MANUAL DIFFEREN   Result Value Ref Range    PSA 0.512 0.000 - 4.000 ng/mL   Microalbumin / Creatinine Urine Ratio - Urine, Clean Catch    Specimen: Urine, Clean Catch    URINE   Result Value Ref Range    Creatinine, Urine 48.1 Not Estab. mg/dL    Microalbumin, Urine <3.0 Not Estab. ug/mL    Microalbumin/Creatinine Ratio <6 0 - 29 mg/g creat   Lipid Panel    Specimen: Blood    BLOOD  MANUAL DIFFEREN   Result Value Ref Range    Total Cholesterol 196 0 - 200 mg/dL    Triglycerides 172 (H) 0 - 150 mg/dL    HDL Cholesterol 52 40 - 60 mg/dL    VLDL Cholesterol Leonardo 30 5 - 40 mg/dL    LDL Chol Calc (NIH) 114 (H) 0 - 100 mg/dL   TSH    Specimen: Blood    BLOOD  MANUAL DIFFEREN   Result Value Ref Range    TSH 3.870 0.270 - 4.200 uIU/mL   Comprehensive Metabolic Panel    Specimen: Blood    BLOOD  MANUAL DIFFEREN   Result Value Ref Range    Glucose 96 65 - 99 mg/dL    BUN 17 8 - 23 mg/dL    Creatinine 1.01 0.76 - 1.27 mg/dL    eGFR Non African Am 74 >60 mL/min/1.73    eGFR African Am 89 >60 mL/min/1.73     BUN/Creatinine Ratio 16.8 7.0 - 25.0    Sodium 143 136 - 145 mmol/L    Potassium 4.9 3.5 - 5.2 mmol/L    Chloride 104 98 - 107 mmol/L    Total CO2 30.2 (H) 22.0 - 29.0 mmol/L    Calcium 9.3 8.6 - 10.5 mg/dL    Total Protein 6.3 6.0 - 8.5 g/dL    Albumin 4.80 3.50 - 5.20 g/dL    Globulin 1.5 gm/dL    A/G Ratio 3.2 g/dL    Total Bilirubin 0.5 0.0 - 1.2 mg/dL    Alkaline Phosphatase 75 39 - 117 U/L    AST (SGOT) 18 1 - 40 U/L    ALT (SGPT) 17 1 - 41 U/L   Microscopic Examination -    URINE   Result Value Ref Range    WBC, UA Comment /HPF    RBC, UA Comment /HPF    Epithelial Cells (non renal) Comment /HPF    Bacteria, UA Comment None Seen /HPF   Urinalysis With Microscopic - Urine, Clean Catch    Specimen: Urine, Clean Catch    URINE   Result Value Ref Range    Specific Gravity, UA 1.020 1.005 - 1.030    pH, UA 7.0 5.0 - 8.0    Color, UA Straw     Appearance, UA Clear Clear    Leukocytes, UA Negative Negative    Protein Negative Negative    Glucose, UA Negative Negative    Ketones Negative Negative    Blood, UA Negative Negative    Bilirubin, UA Negative Negative    Urobilinogen, UA Comment     Nitrite, UA Negative Negative   CBC & Differential    Specimen: Blood    BLOOD  MANUAL DIFFEREN   Result Value Ref Range    WBC 6.45 3.40 - 10.80 10*3/mm3    RBC 5.28 4.14 - 5.80 10*6/mm3    Hemoglobin 16.4 13.0 - 17.7 g/dL    Hematocrit 49.4 37.5 - 51.0 %    MCV 93.6 79.0 - 97.0 fL    MCH 31.1 26.6 - 33.0 pg    MCHC 33.2 31.5 - 35.7 g/dL    RDW 12.5 12.3 - 15.4 %    Platelets 170 140 - 450 10*3/mm3    Neutrophil Rel % 67.9 42.7 - 76.0 %    Lymphocyte Rel % 20.8 19.6 - 45.3 %    Monocyte Rel % 8.4 5.0 - 12.0 %    Eosinophil Rel % 2.2 0.3 - 6.2 %    Basophil Rel % 0.5 0.0 - 1.5 %    Neutrophils Absolute 4.39 1.70 - 7.00 10*3/mm3    Lymphocytes Absolute 1.34 0.70 - 3.10 10*3/mm3    Monocytes Absolute 0.54 0.10 - 0.90 10*3/mm3    Eosinophils Absolute 0.14 0.00 - 0.40 10*3/mm3    Basophils Absolute 0.03 0.00 - 0.20 10*3/mm3     Immature Granulocyte Rel % 0.2 0.0 - 0.5 %    Immature Grans Absolute 0.01 0.00 - 0.05 10*3/mm3    nRBC 0.0 0.0 - 0.2 /100 WBC       ECG 12 Lead    Date/Time: 10/29/2020 11:33 AM  Performed by: Gina Adamson MD  Authorized by: Gina Adamson MD   Comparison: compared with previous ECG from 2/9/2020  Similar to previous ECG  Rhythm: sinus rhythm  Rate: normal  BPM: 75  Conduction: right bundle branch block  ST Segments: ST segments normal  T Waves: T waves normal  QRS axis: left  Clinical impression comment: stable EKG                ASSESSMENT/PLAN    Diagnoses and all orders for this visit:    1. Medicare annual wellness visit, subsequent (Primary)  Assessment & Plan:  Health maintenance - flu vacc done 9/20; Prevnar 8/18; PVX 10/19; Tdap 9/13 (next Td due 2023), Zostavax 12/16; HAV and Shingrix done; colonosc 7/17, repeat 2022 with Dr. George; PSA stable 0.512; eye exam with Dr. Aragon TBD (glasses); dental exam UTD (s/p implants); (+) seat belt use    Consultants:  Patient Care Team:  Gina Adamson MD as PCP - Franny Treviño MD as Consulting Physician (Dermatology)  Erwin George MD as Consulting Physician (Gastroenterology)  Yoseph Aragon MD as Consulting Physician (Ophthalmology)  Paramjit Trevino MD as Consulting Physician (Orthopedic Surgery)  Remington Eller DPM as Consulting Physician (Podiatry)        Orders:  -     Ambulatory Referral to Advance Care Planning    2. Dyslipidemia  Assessment & Plan:  Lipids stable with ; goal < 130, ideally < 100; no meds    Orders:  -     ECG 12 Lead    3. Hypertension  Assessment & Plan:  BP elevated even on repeat but he reports normotensive readings at home; no meds; rec low Na diet, increased aerobic exercise; continue home BP monitoring with goal < 130/80; f/u if home BPs increase, o/w f/u in 6 mos at the latest      4. Impaired fasting glucose  Assessment & Plan:  BG control stable/good with A1C 5.6;  encouraged reg phys activity to decr insulin resistance, moderation in unhealthy starches/sweets; f/u A1C in 6 mos        5. Moderate persistent asthmatic bronchitis without complication  Assessment & Plan:  Will be resuming Symbicort 160/4.5 2 puffs BID #3, 3RF, gargle/spit after puffs, due to cold weather season      Orders:  -     budesonide-formoterol (Symbicort) 80-4.5 MCG/ACT inhaler; 2 puffs BID, gargle and spit after puffs  Dispense: 3 inhaler; Refill: 3    6. Lipoma of torso  Assessment & Plan:  Multiple lipomas on the abdomen bilaterally; recent excisions x2 at the upper abd(lower chest) area per Dr. Amador      7. Laceration of left index finger without foreign body without damage to nail, initial encounter  Comments:  closed 1cm laceration pad of ventral middle phalanx; no infxn; steri-strips applied and then bandaid applied; keep dry and clean; Td UTD; f/u prn      FOLLOW-UP  RTC 6 mos with A1C and BP check    Electronically signed by:    Gina Adamson MD, FACP  10/29/2020

## 2020-10-30 NOTE — ASSESSMENT & PLAN NOTE
Multiple lipomas on the abdomen bilaterally; recent excisions x2 at the upper abd(lower chest) area per Dr. Amador

## 2020-10-30 NOTE — ASSESSMENT & PLAN NOTE
Will be resuming Symbicort 160/4.5 2 puffs BID #3, 3RF, gargle/spit after puffs, due to cold weather season

## 2020-12-28 ENCOUNTER — OFFICE VISIT (OUTPATIENT)
Dept: INTERNAL MEDICINE | Facility: CLINIC | Age: 65
End: 2020-12-28

## 2020-12-28 VITALS
HEART RATE: 94 BPM | BODY MASS INDEX: 26.36 KG/M2 | WEIGHT: 178 LBS | SYSTOLIC BLOOD PRESSURE: 146 MMHG | OXYGEN SATURATION: 98 % | HEIGHT: 69 IN | DIASTOLIC BLOOD PRESSURE: 80 MMHG | TEMPERATURE: 97.3 F

## 2020-12-28 DIAGNOSIS — N30.01 ACUTE CYSTITIS WITH HEMATURIA: Primary | ICD-10-CM

## 2020-12-28 LAB
BILIRUB BLD-MCNC: NEGATIVE MG/DL
CLARITY, POC: ABNORMAL
COLOR UR: YELLOW
GLUCOSE UR STRIP-MCNC: NEGATIVE MG/DL
KETONES UR QL: NEGATIVE
LEUKOCYTE EST, POC: ABNORMAL
NITRITE UR-MCNC: NEGATIVE MG/ML
PH UR: 6 [PH] (ref 5–8)
PROT UR STRIP-MCNC: ABNORMAL MG/DL
RBC # UR STRIP: ABNORMAL /UL
SP GR UR: 1.03 (ref 1–1.03)
UROBILINOGEN UR QL: NORMAL

## 2020-12-28 PROCEDURE — 81003 URINALYSIS AUTO W/O SCOPE: CPT | Performed by: NURSE PRACTITIONER

## 2020-12-28 PROCEDURE — 99213 OFFICE O/P EST LOW 20 MIN: CPT | Performed by: NURSE PRACTITIONER

## 2020-12-28 RX ORDER — CIPROFLOXACIN 500 MG/1
500 TABLET, FILM COATED ORAL 2 TIMES DAILY
Qty: 20 TABLET | Refills: 0 | Status: SHIPPED | OUTPATIENT
Start: 2020-12-28 | End: 2021-01-07

## 2020-12-28 NOTE — PROGRESS NOTES
Chief Complaint   Patient presents with   • Urinary Tract Infection   • Back Pain       History of Present Illness  65 y.o.male presents for uti back pain.    Urinary Tract Infection   This is a new problem. The current episode started in the past 7 days. The problem occurs intermittently. The problem has been gradually worsening. The quality of the pain is described as burning. There has been no fever. Associated symptoms include flank pain, frequency and urgency. Pertinent negatives include no chills, discharge, hematuria, nausea or vomiting. He has tried nothing for the symptoms.   has low back pain nonradiating.  Has to urinate about every 45 minutes during day.      Review of Systems   Constitutional: Negative for chills, fatigue and fever.   Respiratory: Negative for shortness of breath.    Cardiovascular: Negative for chest pain.   Gastrointestinal: Negative for abdominal pain, constipation, diarrhea, nausea and vomiting.   Genitourinary: Positive for dysuria, flank pain, frequency, nocturia and urgency. Negative for decreased urine volume, difficulty urinating, hematuria and testicular pain.   Musculoskeletal: Positive for back pain. Negative for myalgias.         [facility]  The following portions of the patient's history were reviewed and updated as appropriate: allergies, current medications, past family history, past medical history, past social history, past surgical history and problem list.     Past Medical History:   Diagnosis Date   • Arthralgia of elbow 7/31/2016    Description: chronic tendonitis of the elbows, ortho -   • Closed comminuted fracture of 2nd toe, intra-articular 7/24/2017    Podiatry - Dr. Velásquez (5/31/17): postop shoe, RTC 4 wks   • Eyelid excess skin 4/23/2018 4/18/18 ophtho/Dr. Aragon - dermatochalasis bilaterally, return for upper lid eval for bleph   • Finger amputation, traumatic 1991    h/o L.index finger amp seconary to bleacher injury, s/p failed surgery   • Foot pain  7/31/2016    Impression: 06/05/2015 - cont with cream per Dr. Davidson; patient currently not interested in surgery  Impression: 04/20/2015 - ongoing eval per Dr. Davidson; exercise modification as above; surgery would be last resort  Impression: 01/20/2015 - rec f/u with Dr. Davidson; Description: secondary to 2nd MTP capsulitis, related to hammertoe (4/15), s/p injection (2/16); podiatry - Dr. Davidson   • Hx of brain MRI 02/09/2020    brain MRI (2/9/20): Mild chronic small vessel ischemic changes, fluid in mastoid cells c/w chronic paranasal sinusitis   • Hx of chest x-ray 02/28/2018    CXR (2/28/18): lenticular opacities at bases sugg of atelectasis   • Hx of chest x-ray 09/16/2019    CXR (9/16/19): chronic changes of atelectasia dn scarring at right hilar and infrahilar region as well as prior healed   • Hx of colonoscopy 06/04/2012    colonosc (6/4/12): polyp, diverticulosis, int hem; GI - Dr. George   • Hx of colonoscopy 07/24/2017    colonosc (7/24/17): polyp; GI - Dr. George   • Hx of exercise stress test 02/11/2015    nl GXT (2/11/15)   • Right hip pain 1/31/2017 9/11/1R. Sciatic, now with proximal hamstring tendinitis, cont hamstring stretches, yoga, and walking exercise; f/u prn flare-up; ortho Nick Trevino; 7/7/17 no longer with sciatic sxs, agree with yoga and aquatic exercises, consider MRI L-spine if no better, maki Trevino; 6/1/17 R. Hip pain due to mild degenerative changes by xray, rx PT at [facility] and rtc 6 wks, ortho Nick Trevino; 3/29/17 R   • Shoulder joint pain 7/31/2016    Description: chronic tendonitis of the shoulders; ortho -   • Trochanteric bursitis of right hip 02/08/2017    s/p injection (2/8/17), resolved; ortho Nick Trevino      Past Surgical History:   Procedure Laterality Date   • AMPUTATION DIGIT Left 1991     [facility]p failed surgery x2    • BUNIONECTOMY  10/2015    s/p [place] bunionectomy, 2nd metatarsal  osteotomy shortening, arthrodesis (10/15); podiatry - Dr. Davidson   • CARDIAC SURGERY     • COLONOSCOPY      You already have this information   • INGUINAL HERNIA REPAIR     • KNEE SURGERY Left    • METATARSAL OSTEOTOMY      history of buniuon correction with metatarsal osteotomy  s/p [place] bunionectomty, 2nd metatarsal osteotomy shortening, arthrodesis (10/15);  podiatry - Dr. Davidson      Allergies   Allergen Reactions   • Cosamin Ds [Glucosamine-Chondroitin] Hives   • Erythromycin Unknown (See Comments)   • Naproxen    • Sulfa Antibiotics Hives   • Sulfamethoxazole Unknown (See Comments)   • Trimethoprim Unknown (See Comments)      Social History     Socioeconomic History   • Marital status:    Occupational History   • Occupation:      Comment: travels and gives presentations   Tobacco Use   • Smoking status: Never Smoker   • Smokeless tobacco: Never Used   Substance and Sexual Activity   • Alcohol use: Yes     Comment: Maybe once a month   • Drug use: No   • Sexual activity: Never   Social History Narrative    3 sons        Teaches only civil engineering classes at [employer]     Family History   Problem Relation Age of Onset   • Heart attack Mother    • Diabetes Mother    • Heart disease Mother    • Hypertension Mother    • Heart attack Father    • Aneurysm Father         AAA   • Coronary artery disease Father         CABG   • Heart failure Father    • Arrhythmia Father         pacemaker   • Skin cancer Father    • Valvular heart disease Father    • Hyperlipidemia Sister    • Heart attack Brother    • Hypertension Brother    • Heart attack Cousin         pat cousin  age 44   • Hyperlipidemia Sister    • Hypertension Brother    • Hyperlipidemia Brother             Current Outpatient Medications:   •  aspirin 81 MG tablet, Take 1 tablet by mouth Daily., Disp: , Rfl:   •  budesonide-formoterol (Symbicort) 80-4.5 MCG/ACT inhaler, 2 puffs BID, gargle and spit after puffs, Disp: 3  "inhaler, Rfl: 3  •  carboxymethylcellulose (REFRESH PLUS) 0.5 % solution, 3 (Three) Times a Day As Needed for Dry Eyes., Disp: , Rfl:   •  cetirizine (zyrTEC) 10 MG tablet, Take 10 mg by mouth Daily., Disp: , Rfl:   •  HE PO, Take  by mouth., Disp: , Rfl:   •  Cholecalciferol (VITAMIN D) 2000 UNITS capsule, Take 1 capsule by mouth daily., Disp: , Rfl:   •  Cinnamon 500 MG tablet, Take 1 tablet by mouth daily., Disp: , Rfl:   •  fluticasone (FLONASE) 50 MCG/ACT nasal spray, 2 sprays into the nostril(s) as directed by provider Daily., Disp: 3 bottle, Rfl: 1  •  ibuprofen (ADVIL,MOTRIN) 600 MG tablet, Take 1 tablet by mouth 2 (two) times a day as needed., Disp: , Rfl:   •  Multiple Vitamins-Minerals (CENTRUM SILVER ADULT 50+ PO), Take 1 tablet by mouth daily., Disp: , Rfl:   •  Omega-3 Krill Oil 1000 MG capsule, Take 1 tablet by mouth daily., Disp: , Rfl:   •  Probiotic Product (PROBIOTIC-10 PO), Take  by mouth., Disp: , Rfl:   •  Turmeric 500 MG capsule, Take 500 mg by mouth Daily., Disp: , Rfl:     VITALS:  /80   Pulse 94   Temp 97.3 °F (36.3 °C)   Ht 174 cm (68.5\")   Wt 80.7 kg (178 lb)   SpO2 98%   BMI 26.67 kg/m²     Physical Exam  Constitutional:       General: He is not in acute distress.     Appearance: He is well-developed.   Cardiovascular:      Rate and Rhythm: Normal rate.   Pulmonary:      Effort: Pulmonary effort is normal.   Abdominal:      Tenderness: There is no abdominal tenderness. There is no right CVA tenderness or left CVA tenderness.   Skin:     General: Skin is warm and dry.      Findings: No rash.   Neurological:      Mental Status: He is alert and oriented to person, place, and time.   Psychiatric:         Mood and Affect: Mood normal.         LABS  Results for orders placed or performed in visit on 12/28/20   POCT urinalysis dipstick, automated    Specimen: Urine   Result Value Ref Range    Color Yellow Yellow, Straw, Dark Yellow, Jory    Clarity, UA Slightly Cloudy (A) Clear "    Specific Gravity  1.030 1.005 - 1.030    pH, Urine 6.0 5.0 - 8.0    Leukocytes Trace (A) Negative    Nitrite, UA Negative Negative    Protein, POC 30 mg/dL (A) Negative mg/dL    Glucose, UA Negative Negative, 1000 mg/dL (3+) mg/dL    Ketones, UA Negative Negative    Urobilinogen, UA Normal Normal    Bilirubin Negative Negative    Blood, UA 2+ (A) Negative         ASSESSMENT/PLAN  Diagnoses and all orders for this visit:    1. Acute cystitis with hematuria (Primary)  -     POCT urinalysis dipstick, automated  -     Urine Culture - Urine, Urine, Clean Catch; Future  -     ciprofloxacin (Cipro) 500 MG tablet; Take 1 tablet by mouth 2 (Two) Times a Day for 10 days.  Dispense: 20 tablet; Refill: 0  -     Urine Culture - Urine, Urine, Clean Catch    in 66 yo male treat as complicated uti with cipro 10 day; multi drug allergy noted. Reviewed flouroquinolone risks benefits with pt.  Normal psa noted in Oct 2020.  Drink plenty water. Complete all medication as instructed.  Will follow up on urine culture and notify patient if antibiotic prescribed does not cover identified bacteria. If patient has worsening of symptoms or no improvement of fever >101.5 or brianda flank pain, pt should notify our office for reeval or seek emergency care.    I discussed the patients findings and my recommendations with patient.  Patient was encouraged to keep me informed of any acute changes, lack of improvement, or any new concerning symptoms.  Patient voiced understanding of all instructions and denied further questions.      FOLLOW-UP  Return if symptoms worsen or fail to improve.    Electronically signed by:    ANAID Reed  12/28/2020    EMR Dragon/Transcription Disclaimer:  Much of this encounter note is an electronic transcription/translation of spoken language to printed text.  The electronic translation of spoken language may permit erroneous, or at times, nonsensical words or phrases to be inadvertently transcribed.   Although I have reviewed the note for such errors, some may still exist

## 2020-12-30 LAB
BACTERIA UR CULT: ABNORMAL
BACTERIA UR CULT: ABNORMAL
OTHER ANTIBIOTIC SUSC ISLT: ABNORMAL

## 2021-02-06 DIAGNOSIS — J30.2 SEASONAL ALLERGIC RHINITIS, UNSPECIFIED TRIGGER: ICD-10-CM

## 2021-02-06 RX ORDER — FLUTICASONE PROPIONATE 50 MCG
2 SPRAY, SUSPENSION (ML) NASAL DAILY
Status: CANCELLED | OUTPATIENT
Start: 2021-02-06

## 2021-02-08 RX ORDER — FLUTICASONE PROPIONATE 50 MCG
SPRAY, SUSPENSION (ML) NASAL
Refills: 0 | OUTPATIENT
Start: 2021-02-08

## 2021-02-08 NOTE — TELEPHONE ENCOUNTER
Last Office Visit: 12/28/20  Next Office Visit:4/29/21    Labs completed in past 6 months? yes  Labs completed in past year? yes    Last Refill Date:5/28/20  Quantity:3 bottles  Refills:1    Pharmacy:

## 2021-02-09 RX ORDER — FLUTICASONE PROPIONATE 50 MCG
2 SPRAY, SUSPENSION (ML) NASAL DAILY
Qty: 16 G | Refills: 5 | Status: SHIPPED | OUTPATIENT
Start: 2021-02-09

## 2021-04-01 ENCOUNTER — OFFICE VISIT (OUTPATIENT)
Dept: INTERNAL MEDICINE | Facility: CLINIC | Age: 66
End: 2021-04-01

## 2021-04-01 VITALS
OXYGEN SATURATION: 99 % | DIASTOLIC BLOOD PRESSURE: 84 MMHG | BODY MASS INDEX: 25.62 KG/M2 | HEART RATE: 82 BPM | TEMPERATURE: 97.1 F | HEIGHT: 69 IN | WEIGHT: 173 LBS | SYSTOLIC BLOOD PRESSURE: 148 MMHG

## 2021-04-01 DIAGNOSIS — E86.0 MILD DEHYDRATION: ICD-10-CM

## 2021-04-01 DIAGNOSIS — R55 SYNCOPE AND COLLAPSE: Primary | ICD-10-CM

## 2021-04-01 PROCEDURE — 99213 OFFICE O/P EST LOW 20 MIN: CPT | Performed by: NURSE PRACTITIONER

## 2021-04-01 PROCEDURE — 93000 ELECTROCARDIOGRAM COMPLETE: CPT | Performed by: NURSE PRACTITIONER

## 2021-04-01 NOTE — PROGRESS NOTES
Yoseph Granados Jr. is a 66 y.o. male who presents for GI problem.    Chief Complaint   Patient presents with   • Nausea     Pt states passed out yesterday morning, unsure of how long due to being alone at home   • Dizziness   • Headache       Patient report has nausea, dizziness, headaches in the posterior aspect of the head since yesterday. Patient reports that he possibly had a syncopal episode. He has an abrasion on the left shoulder. No other injuries report.     GI Problem  The primary symptoms include nausea. Primary symptoms do not include fever, fatigue, abdominal pain, vomiting, diarrhea, hematemesis, dysuria, arthralgias or rash. Primary symptoms comment: headaches and dizziness. The illness began yesterday. The onset was sudden.   The illness does not include chills, constipation or back pain. Associated medical issues do not include inflammatory bowel disease, GERD, liver disease or alcohol abuse.         Past Medical History:   Diagnosis Date   • Arthralgia of elbow 7/31/2016    Description: chronic tendonitis of the elbows, ortho -   • Closed comminuted fracture of 2nd toe, intra-articular 7/24/2017    Podiatry - Dr. Velásquez (5/31/17): postop shoe, RTC 4 wks   • Eyelid excess skin 4/23/2018 4/18/18 ophtho/Dr. Aragon - dermatochalasis bilaterally, return for upper lid eval for bleph   • Finger amputation, traumatic 1991    h/o L.index finger amp seconary to bleacher injury, s/p failed surgery   • Foot pain 7/31/2016    Impression: 06/05/2015 - cont with cream per Dr. Davidson; patient currently not interested in surgery  Impression: 04/20/2015 - ongoing eval per Dr. Davidson; exercise modification as above; surgery would be last resort  Impression: 01/20/2015 - rec f/u with Dr. Davidson; Description: secondary to 2nd MTP capsulitis, related to hammertoe (4/15), s/p injection (2/16); podiatry - Dr. Davidson   • Hx of brain MRI 02/09/2020    brain MRI (2/9/20): Mild chronic small vessel ischemic changes, fluid  in mastoid cells c/w chronic paranasal sinusitis   • Hx of chest x-ray 02/28/2018    CXR (2/28/18): lenticular opacities at bases sugg of atelectasis   • Hx of chest x-ray 09/16/2019    CXR (9/16/19): chronic changes of atelectasia dn scarring at right hilar and infrahilar region as well as prior healed   • Hx of colonoscopy 06/04/2012    colonosc (6/4/12): polyp, diverticulosis, int hem; GI - Dr. George   • Hx of colonoscopy 07/24/2017    colonosc (7/24/17): polyp; GI - Dr. George   • Hx of exercise stress test 02/11/2015    nl GXT (2/11/15)   • Right hip pain 1/31/2017 9/11/1R. Sciatic, now with proximal hamstring tendinitis, cont hamstring stretches, yoga, and walking exercise; f/u prn flare-up; ortho Nick Trevino; 7/7/17 no longer with sciatic sxs, agree with yoga and aquatic exercises, consider MRI L-spine if no better, maki Trevino; 6/1/17 R. Hip pain due to mild degenerative changes by xray, rx PT at [facility] and rtc 6 wks, ortho Nick Trevino; 3/29/17 R   • Shoulder joint pain 7/31/2016    Description: chronic tendonitis of the shoulders; ortho -   • Trochanteric bursitis of right hip 02/08/2017    s/p injection (2/8/17), resolved; ortho Nick Trevino       Past Surgical History:   Procedure Laterality Date   • AMPUTATION DIGIT Left 1991     History of Amputated Index Finger DIP Joint(s) secondary to injury at bleachers; s/p failed surgery x2    • BUNIONECTOMY  10/2015    s/p [place] bunionectomy, 2nd metatarsal osteotomy shortening, arthrodesis (10/15); podiatry - Dr. Davidson   • CARDIAC SURGERY     • COLONOSCOPY  2017    You already have this information   • INGUINAL HERNIA REPAIR     • KNEE SURGERY Left 1985   • METATARSAL OSTEOTOMY      history of buniuon correction with metatarsal osteotomy  s/p [place] bunionectomty, 2nd metatarsal osteotomy shortening, arthrodesis (10/15);  podiatry - Dr. Davidson       Family History   Problem Relation Age of Onset   • Heart attack Mother    • Diabetes Mother    •  Heart disease Mother    • Hypertension Mother    • Heart attack Father    • Aneurysm Father         AAA   • Coronary artery disease Father         CABG   • Heart failure Father    • Arrhythmia Father         pacemaker   • Skin cancer Father    • Valvular heart disease Father    • Hyperlipidemia Sister    • Heart attack Brother    • Hypertension Brother    • Heart attack Cousin         pat cousin  age 44   • Hyperlipidemia Sister    • Hypertension Brother    • Hyperlipidemia Brother        Social History     Socioeconomic History   • Marital status:      Spouse name: Not on file   • Number of children: 3   • Years of education: Not on file   • Highest education level: Not on file   Tobacco Use   • Smoking status: Never Smoker   • Smokeless tobacco: Never Used   Substance and Sexual Activity   • Alcohol use: Yes     Comment: Maybe once a month   • Drug use: No   • Sexual activity: Never       Allergies   Allergen Reactions   • Cosamin Ds [Glucosamine-Chondroitin] Hives   • Erythromycin Hives   • Naproxen Hives   • Sulfa Antibiotics Hives   • Sulfamethoxazole Hives   • Trimethoprim Hives       ROS    Review of Systems   Constitutional: Negative for chills, fatigue and fever.   HENT: Positive for rhinorrhea (clear drainage ). Negative for congestion, ear pain and sore throat.    Eyes: Negative for blurred vision, double vision, pain and visual disturbance.   Respiratory: Negative for cough, chest tightness and shortness of breath.    Cardiovascular: Negative for chest pain, palpitations and leg swelling.   Gastrointestinal: Positive for nausea. Negative for abdominal pain, constipation, diarrhea, hematemesis and vomiting.   Endocrine: Negative for cold intolerance and heat intolerance.   Genitourinary: Negative for dysuria and hematuria.   Musculoskeletal: Negative for arthralgias and back pain.   Skin: Positive for color change. Negative for rash.   Allergic/Immunologic: Negative for environmental allergies,  "food allergies and immunocompromised state.   Neurological: Positive for dizziness, syncope (one episode), light-headedness and headache. Negative for weakness.   Hematological: Negative for adenopathy.   Psychiatric/Behavioral: Positive for stress (recently retired earlier in March). Negative for suicidal ideas and depressed mood.       Vitals:    04/01/21 0952 04/01/21 1025 04/01/21 1103   BP: 170/86 160/82 148/84   BP Location:   Right arm   Pulse: 82     Temp: 97.1 °F (36.2 °C)     SpO2: 99%     Weight: 78.5 kg (173 lb)     Height: 174 cm (68.5\")     PainSc:   4           Current Outpatient Medications:   •  aspirin 81 MG tablet, Take 1 tablet by mouth Daily., Disp: , Rfl:   •  budesonide-formoterol (Symbicort) 80-4.5 MCG/ACT inhaler, 2 puffs BID, gargle and spit after puffs, Disp: 3 inhaler, Rfl: 3  •  carboxymethylcellulose (REFRESH PLUS) 0.5 % solution, 3 (Three) Times a Day As Needed for Dry Eyes., Disp: , Rfl:   •  cetirizine (zyrTEC) 10 MG tablet, Take 10 mg by mouth Daily., Disp: , Rfl:   •  HE PO, Take  by mouth., Disp: , Rfl:   •  Cholecalciferol (VITAMIN D) 2000 UNITS capsule, Take 1 capsule by mouth daily., Disp: , Rfl:   •  Cinnamon 500 MG tablet, Take 1 tablet by mouth daily., Disp: , Rfl:   •  fluticasone (FLONASE) 50 MCG/ACT nasal spray, 2 sprays into the nostril(s) as directed by provider Daily., Disp: 16 g, Rfl: 5  •  ibuprofen (ADVIL,MOTRIN) 600 MG tablet, Take 1 tablet by mouth 2 (two) times a day as needed., Disp: , Rfl:   •  Multiple Vitamins-Minerals (CENTRUM SILVER ADULT 50+ PO), Take 1 tablet by mouth daily., Disp: , Rfl:   •  Omega-3 Krill Oil 1000 MG capsule, Take 1 tablet by mouth daily., Disp: , Rfl:   •  Probiotic Product (PROBIOTIC-10 PO), Take  by mouth., Disp: , Rfl:   •  Turmeric 500 MG capsule, Take 500 mg by mouth Daily., Disp: , Rfl:     PE    Physical Exam  Vitals and nursing note reviewed.   Constitutional:       General: He is not in acute distress.     Appearance: " Normal appearance. He is well-developed and normal weight. He is not ill-appearing, toxic-appearing or diaphoretic.   HENT:      Head: Normocephalic and atraumatic.      Right Ear: Tympanic membrane normal.      Left Ear: Tympanic membrane normal.      Nose: Nose normal.      Mouth/Throat:      Mouth: Mucous membranes are moist.      Pharynx: Oropharynx is clear.   Eyes:      Pupils: Pupils are equal, round, and reactive to light.   Cardiovascular:      Rate and Rhythm: Normal rate and regular rhythm.      Pulses: Normal pulses.      Heart sounds: Normal heart sounds. No murmur heard.   No friction rub. No gallop.    Pulmonary:      Effort: Pulmonary effort is normal.      Breath sounds: Normal breath sounds.   Abdominal:      General: Bowel sounds are normal.   Musculoskeletal:         General: Normal range of motion.      Cervical back: Normal range of motion and neck supple.   Lymphadenopathy:      Cervical: No cervical adenopathy.   Skin:     General: Skin is warm.      Capillary Refill: Capillary refill takes less than 2 seconds.      Findings: Abrasion and erythema present. No rash.             Comments: Small abrasion occurring from syncopal episode yesterday    Neurological:      General: No focal deficit present.      Mental Status: He is alert and oriented to person, place, and time. Mental status is at baseline.      GCS: GCS eye subscore is 4. GCS verbal subscore is 5. GCS motor subscore is 6.      Cranial Nerves: Cranial nerves are intact.      Sensory: Sensation is intact.      Motor: Motor function is intact.      Coordination: Coordination is intact.      Gait: Gait is intact. Gait normal.   Psychiatric:         Attention and Perception: Attention and perception normal.         Mood and Affect: Mood normal.         Speech: Speech normal.         Behavior: Behavior normal. Behavior is cooperative.         Thought Content: Thought content normal.         Judgment: Judgment normal.           A/P    Problem List Items Addressed This Visit     None      Visit Diagnoses     Syncope and collapse    -  Primary    educate patient on being sure to keep hydrated, especially during exertion. Do 12-lead ECG.  Order labs to r/o other causes.     Relevant Orders    Comprehensive Metabolic Panel (Completed)    ECG 12 Lead    Mild dehydration        educate patient on being sure to keep hydrated, especially during exertion. Do 12-lead ECG.  Order labs to r/o other causes.     Relevant Orders    Comprehensive Metabolic Panel (Completed)          ECG 12 Lead    Date/Time: 4/1/2021 3:19 PM  Performed by: Armani Riddle APRN  Authorized by: Armani Riddle APRN   Comparison: compared with previous ECG from 10/29/2020  Similar to previous ECG  Rhythm: sinus rhythm  Rate: normal  BPM: 66  Conduction: right bundle branch block    Clinical impression: abnormal EKG            Assessment      ICD-10-CM ICD-9-CM   1. Syncope and collapse  R55 780.2   2. Mild dehydration  E86.0 276.51            Problems Addressed this Visit     None      Visit Diagnoses     Syncope and collapse    -  Primary    educate patient on being sure to keep hydrated, especially during exertion. Do 12-lead ECG.  Order labs to r/o other causes.     Relevant Orders    Comprehensive Metabolic Panel (Completed)    ECG 12 Lead    Mild dehydration        educate patient on being sure to keep hydrated, especially during exertion. Do 12-lead ECG.  Order labs to r/o other causes.     Relevant Orders    Comprehensive Metabolic Panel (Completed)      Diagnoses       Codes Comments    Syncope and collapse    -  Primary ICD-10-CM: R55  ICD-9-CM: 780.2 educate patient on being sure to keep hydrated, especially during exertion. Do 12-lead ECG.  Order labs to r/o other causes.     Mild dehydration     ICD-10-CM: E86.0  ICD-9-CM: 276.51 educate patient on being sure to keep hydrated, especially during exertion. Do 12-lead ECG.  Order labs to r/o other  causes.            Plan    Orders Placed This Encounter   Procedures   • Comprehensive Metabolic Panel   • ECG 12 Lead     No orders of the defined types were placed in this encounter.                Plan of care reviewed with patient at the conclusion of today's visit. Education was provided regarding diagnosis, management and any prescribed or recommended OTC medications.  Patient verbalizes understanding of and agreement with management plan.    Return for Next scheduled follow up.     ANAID Boswell

## 2021-04-01 NOTE — PATIENT INSTRUCTIONS
Dehydration, Adult  Dehydration is condition in which there is not enough water or other fluids in the body. This happens when a person loses more fluids than he or she takes in. Important body parts cannot work right without the right amount of fluids. Any loss of fluids from the body can cause dehydration.  Dehydration can be mild, worse, or very bad. It should be treated right away to keep it from getting very bad.  What are the causes?  This condition may be caused by:  · Conditions that cause loss of water or other fluids, such as:  ? Watery poop (diarrhea).  ? Vomiting.  ? Sweating a lot.  ? Peeing (urinating) a lot.  · Not drinking enough fluids, especially when you:  ? Are ill.  ? Are doing things that take a lot of energy to do.  · Other illnesses and conditions, such as fever or infection.  · Certain medicines, such as medicines that take extra fluid out of the body (diuretics).  · Lack of safe drinking water.  · Not being able to get enough water and food.  What increases the risk?  The following factors may make you more likely to develop this condition:  · Having a long-term (chronic) illness that has not been treated the right way, such as:  ? Diabetes.  ? Heart disease.  ? Kidney disease.  · Being 65 years of age or older.  · Having a disability.  · Living in a place that is high above the ground or sea (high in altitude). The thinner, dried air causes more fluid loss.  · Doing exercises that put stress on your body for a long time.  What are the signs or symptoms?  Symptoms of dehydration depend on how bad it is.  Mild or worse dehydration  · Thirst.  · Dry lips or dry mouth.  · Feeling dizzy or light-headed, especially when you stand up from sitting.  · Muscle cramps.  · Your body making:  ? Dark pee (urine). Pee may be the color of tea.  ? Less pee than normal.  ? Less tears than normal.  · Headache.  Very bad dehydration  · Changes in skin. Skin may:  ? Be cold to the touch (clammy).  ? Be blotchy  or pale.  ? Not go back to normal right after you lightly pinch it and let it go.  · Little or no tears, pee, or sweat.  · Changes in vital signs, such as:  ? Fast breathing.  ? Low blood pressure.  ? Weak pulse.  ? Pulse that is more than 100 beats a minute when you are sitting still.  · Other changes, such as:  ? Feeling very thirsty.  ? Eyes that look hollow (sunken).  ? Cold hands and feet.  ? Being mixed up (confused).  ? Being very tired (lethargic) or having trouble waking from sleep.  ? Short-term weight loss.  ? Loss of consciousness.  How is this treated?  Treatment for this condition depends on how bad it is. Treatment should start right away. Do not wait until your condition gets very bad. Very bad dehydration is an emergency. You will need to go to a hospital.  · Mild or worse dehydration can be treated at home. You may be asked to:  ? Drink more fluids.  ? Drink an oral rehydration solution (ORS). This drink helps get the right amounts of fluids and salts and minerals in the blood (electrolytes).  · Very bad dehydration can be treated:  ? With fluids through an IV tube.  ? By getting normal levels of salts and minerals in your blood. This is often done by giving salts and minerals through a tube. The tube is passed through your nose and into your stomach.  ? By treating the root cause.  Follow these instructions at home:  Oral rehydration solution  If told by your doctor, drink an ORS:  · Make an ORS. Use instructions on the package.  · Start by drinking small amounts, about ½ cup (120 mL) every 5-10 minutes.  · Slowly drink more until you have had the amount that your doctor said to have.  Eating and drinking              · Drink enough clear fluid to keep your pee pale yellow. If you were told to drink an ORS, finish the ORS first. Then, start slowly drinking other clear fluids. Drink fluids such as:  ? Water. Do not drink only water. Doing that can make the salt (sodium) level in your body get too  low.  ? Water from ice chips you suck on.  ? Fruit juice that you have added water to (diluted).  ? Low-calorie sports drinks.  · Eat foods that have the right amounts of salts and minerals, such as:  ? Bananas.  ? Oranges.  ? Potatoes.  ? Tomatoes.  ? Spinach.  · Do not drink alcohol.  · Avoid:  ? Drinks that have a lot of sugar. These include:  § High-calorie sports drinks.  § Fruit juice that you did not add water to.  § Soda.  § Caffeine.  ? Foods that are greasy or have a lot of fat or sugar.  General instructions  · Take over-the-counter and prescription medicines only as told by your doctor.  · Do not take salt tablets. Doing that can make the salt level in your body get too high.  · Return to your normal activities as told by your doctor. Ask your doctor what activities are safe for you.  · Keep all follow-up visits as told by your doctor. This is important.  Contact a doctor if:  · You have pain in your belly (abdomen) and the pain:  ? Gets worse.  ? Stays in one place.  · You have a rash.  · You have a stiff neck.  · You get angry or annoyed (irritable) more easily than normal.  · You are more tired or have a harder time waking than normal.  · You feel:  ? Weak or dizzy.  ? Very thirsty.  Get help right away if you have:  · Any symptoms of very bad dehydration.  · Symptoms of vomiting, such as:  ? You cannot eat or drink without vomiting.  ? Your vomiting gets worse or does not go away.  ? Your vomit has blood or green stuff in it.  · Symptoms that get worse with treatment.  · A fever.  · A very bad headache.  · Problems with peeing or pooping (having a bowel movement), such as:  ? Watery poop that gets worse or does not go away.  ? Blood in your poop (stool). This may cause poop to look black and tarry.  ? Not peeing in 6-8 hours.  ? Peeing only a small amount of very dark pee in 6-8 hours.  · Trouble breathing.  These symptoms may be an emergency. Do not wait to see if the symptoms will go away. Get  medical help right away. Call your local emergency services (911 in the [place] Do not drive yourself to the hospital.  Summary  · Dehydration is a condition in which there is not enough water or other fluids in the body. This happens when a person loses more fluids than he or she takes in.  · Treatment for this condition depends on how bad it is. Treatment should be started right away. Do not wait until your condition gets very bad.  · Drink enough clear fluid to keep your pee pale yellow. If you were told to drink an oral rehydration solution (ORS), finish the ORS first. Then, start slowly drinking other clear fluids.  · Take over-the-counter and prescription medicines only as told by your doctor.  · Get help right away if you have any symptoms of very bad dehydration.  This information is not intended to replace advice given to you by your health care provider. Make sure you discuss any questions you have with your health care provider.  Document Revised: 07/30/2020 Document Reviewed: 07/30/2020  [employer] Patient Education © 2021 [employer]    Syncope  Syncope is when you pass out (faint) for a short time. It is caused by a sudden decrease in blood flow to the brain. Signs that you may be about to pass out include:  · Feeling dizzy or light-headed.  · Feeling sick to your stomach (nauseous).  · Seeing all white or all black.  · Having cold, clammy skin.  If you pass out, get help right away. Call your local emergency services (911 in the [place] Do not drive yourself to the hospital.  Follow these instructions at home:  Watch for any changes in your symptoms. Take these actions to stay safe and help with your symptoms:  Lifestyle  · Do not drive, use machinery, or play sports until your doctor says it is okay.  · Do not drink alcohol.  · Do not use any products that contain nicotine or tobacco, such as cigarettes and e-cigarettes. If you need help quitting, ask your doctor.  · Drink enough fluid to keep your pee  (urine) pale yellow.  General instructions  · Take over-the-counter and prescription medicines only as told by your doctor.  · If you are taking blood pressure or heart medicine, sit up and stand up slowly. Spend a few minutes getting ready to sit and then stand. This can help you feel less dizzy.  · Have someone stay with you until you feel stable.  · If you start to feel like you might pass out, lie down right away and raise (elevate) your feet above the level of your heart. Breathe deeply and steadily. Wait until all of the symptoms are gone.  · Keep all follow-up visits as told by your doctor. This is important.  Get help right away if:  · You have a very bad headache.  · You pass out once or more than once.  · You have pain in your chest, belly, or back.  · You have a very fast or uneven heartbeat (palpitations).  · It hurts to breathe.  · You are bleeding from your mouth or your bottom (rectum).  · You have black or tarry poop (stool).  · You have jerky movements that you cannot control (seizure).  · You are confused.  · You have trouble walking.  · You are very weak.  · You have vision problems.  These symptoms may be an emergency. Do not wait to see if the symptoms will go away. Get medical help right away. Call your local emergency services (911 in the [place] Do not drive yourself to the hospital.  Summary  · Syncope is when you pass out (faint) for a short time. It is caused by a sudden decrease in blood flow to the brain.  · Signs that you may be about to faint include feeling dizzy, light-headed, or sick to your stomach, seeing all white or all black, or having cold, clammy skin.  · If you start to feel like you might pass out, lie down right away and raise (elevate) your feet above the level of your heart. Breathe deeply and steadily. Wait until all of the symptoms are gone.  This information is not intended to replace advice given to you by your health care provider. Make sure you discuss any questions  you have with your health care provider.  Document Revised: 01/27/2021 Document Reviewed: 01/30/2019  [employer] Patient Education © 2021 [employer]

## 2021-04-02 LAB
ALBUMIN SERPL-MCNC: 4.8 G/DL (ref 3.5–5.2)
ALBUMIN/GLOB SERPL: 2.8 G/DL
ALP SERPL-CCNC: 68 U/L (ref 39–117)
ALT SERPL-CCNC: 20 U/L (ref 1–41)
AST SERPL-CCNC: 18 U/L (ref 1–40)
BILIRUB SERPL-MCNC: 0.4 MG/DL (ref 0–1.2)
BUN SERPL-MCNC: 14 MG/DL (ref 8–23)
BUN/CREAT SERPL: 15.6 (ref 7–25)
CALCIUM SERPL-MCNC: 9.5 MG/DL (ref 8.6–10.5)
CHLORIDE SERPL-SCNC: 105 MMOL/L (ref 98–107)
CO2 SERPL-SCNC: 26.3 MMOL/L (ref 22–29)
CREAT SERPL-MCNC: 0.9 MG/DL (ref 0.76–1.27)
GLOBULIN SER CALC-MCNC: 1.7 GM/DL
GLUCOSE SERPL-MCNC: 90 MG/DL (ref 65–99)
POTASSIUM SERPL-SCNC: 4.5 MMOL/L (ref 3.5–5.2)
PROT SERPL-MCNC: 6.5 G/DL (ref 6–8.5)
SODIUM SERPL-SCNC: 143 MMOL/L (ref 136–145)

## 2021-04-08 ENCOUNTER — TELEPHONE (OUTPATIENT)
Dept: INTERNAL MEDICINE | Facility: CLINIC | Age: 66
End: 2021-04-08

## 2021-04-08 NOTE — TELEPHONE ENCOUNTER
----- Message from Gina Adamson MD sent at 4/7/2021  9:24 AM EDT -----  Regarding: BP follow-up  Patient just saw Armani for passing out. I am concerned his BP was quite elevated at his visit.    Pls ask him to check his BP and pulse at home - every other day is fine or whenever he does not feel well. Looks like he has appt at end of this month and we can f/u on BP at that time.    ----- Message -----  From: Armani Riddle APRN  Sent: 4/6/2021   9:34 PM EDT  To: Gina Adamson MD

## 2021-04-14 NOTE — PROGRESS NOTES
Please let patient know that all labs are within normal limits. If he experiences dizziness or fainting spells, go to ER and follow up with PCP.

## 2021-04-27 PROBLEM — G51.0 LEFT-SIDED BELL'S PALSY: Status: RESOLVED | Noted: 2020-02-10 | Resolved: 2021-04-27

## 2021-04-28 NOTE — ASSESSMENT & PLAN NOTE
BP borderline but improved on repeat; goal < 130/80; no meds; cont home monitoring but ok to decrease to 2x/week (versus 3x/day)

## 2021-04-28 NOTE — PROGRESS NOTES
"Chief Complaint   Patient presents with   • Impaired Fasting Glucose   • Hypertension       History of Present Illness  66 y.o.  gentleman presents for sugar and blood pressure follow-up. (+) Checking home BPs which have been mostly 110-130s/70s. Pulse 60-70s. Has been working in the yard landscaping and plans to increase exercise.    Has consultation upcoming with ophtho Dr. Leeanne Elizabeth. Thinking about getting eyelids fixed.    Review of Systems  Denies visual changes, CP, palpitations, SOB, falls. Remains on Symbicort off /on but denies issues with breathing or cough currently.  All other ROS reviewed and negative.      Current Outpatient Medications:   •  aspirin 81 MG tablet, Take 1 tablet by mouth Daily., Disp: , Rfl:   •  budesonide-formoterol (Symbicort) 80-4.5 MCG/ACT inhaler, 2 puffs BID  •  carboxymethylcellulose (REFRESH PLUS) 0.5 % solution TID prn dry eyes  •  cetirizine (zyrTEC) 10 MG QD  •  CHERRY PO QD  •  Cholecalciferol (VITAMIN D) 2000 UNITS QD  •  Cinnamon 500 MG QD  •  fluticasone (FLONASE) 50 MCG/ACT nasal spray AD  •  ibuprofen (ADVIL,MOTRIN) 600 MG BID prn  •  CENTRUM SILVER ADULT 50+ PO QD  •  Omega-3 Krill Oil 1000 MG QD  •  Probiotic Product QD  •  Turmeric 500 MG QD    VITALS:  /64 (BP Location: Left arm, Patient Position: Sitting)   Pulse 75   Ht 174 cm (68.5\")   Wt 83.9 kg (185 lb)   SpO2 98%   BMI 27.72 kg/m²     Physical Exam  Vitals and nursing note reviewed.   Constitutional:       General: He is not in acute distress.     Appearance: Normal appearance.   Eyes:      Conjunctiva/sclera: Conjunctivae normal.      Comments: Wears glasses   Cardiovascular:      Rate and Rhythm: Normal rate and regular rhythm.      Heart sounds: Normal heart sounds.   Pulmonary:      Effort: Pulmonary effort is normal. No respiratory distress.      Breath sounds: Normal breath sounds.   Neurological:      Mental Status: He is alert and oriented to person, place, and time. Mental " status is at baseline.      Gait: Gait normal.   Psychiatric:         Mood and Affect: Mood normal.         Behavior: Behavior normal.         LABS  Results for orders placed or performed in visit on 04/29/21   POC Glycosylated Hemoglobin (Hb A1C)    Specimen: Blood   Result Value Ref Range    Hemoglobin A1C 5.8 %     Results for orders placed or performed in visit on 04/01/21   Comprehensive Metabolic Panel    Specimen: Blood    BLOOD   Result Value Ref Range    Glucose 90 65 - 99 mg/dL    BUN 14 8 - 23 mg/dL    Creatinine 0.90 0.76 - 1.27 mg/dL    eGFR Non African Am 84 >60 mL/min/1.73    eGFR African Am 102 >60 mL/min/1.73    BUN/Creatinine Ratio 15.6 7.0 - 25.0    Sodium 143 136 - 145 mmol/L    Potassium 4.5 3.5 - 5.2 mmol/L    Chloride 105 98 - 107 mmol/L    Total CO2 26.3 22.0 - 29.0 mmol/L    Calcium 9.5 8.6 - 10.5 mg/dL    Total Protein 6.5 6.0 - 8.5 g/dL    Albumin 4.80 3.50 - 5.20 g/dL    Globulin 1.7 gm/dL    A/G Ratio 2.8 g/dL    Total Bilirubin 0.4 0.0 - 1.2 mg/dL    Alkaline Phosphatase 68 39 - 117 U/L    AST (SGOT) 18 1 - 40 U/L    ALT (SGPT) 20 1 - 41 U/L     10/20 A1C 5.6    ASSESSMENT/PLAN    Diagnoses and all orders for this visit:    1. Impaired fasting glucose (Primary)  Assessment & Plan:  BG control stable with A1C 5.8; encouraged reg phys activity to decr insulin resistance, moderation in unhealthy starches/sweets; f/u A1C in 6 mos      Orders:  -     POC Glycosylated Hemoglobin (Hb A1C)    2. Hypertension  Assessment & Plan:  BP borderline but improved on repeat; goal < 130/80; no meds; cont home monitoring but ok to decrease to 2x/week (versus 3x/day)        FOLLOW-UP  1. Health maintenance - COVID19 vacc done  2. RTC for AWV 11/29/21; fasting labs prior to appt (CBC, CMP, TSH, lipids, UA/micro, PSA, A1C, microalb) +PFTs    Electronically signed by:    Gina Adamson MD, FACP  04/29/2021

## 2021-04-29 ENCOUNTER — OFFICE VISIT (OUTPATIENT)
Dept: INTERNAL MEDICINE | Facility: CLINIC | Age: 66
End: 2021-04-29

## 2021-04-29 VITALS
HEIGHT: 69 IN | BODY MASS INDEX: 27.4 KG/M2 | OXYGEN SATURATION: 98 % | SYSTOLIC BLOOD PRESSURE: 132 MMHG | WEIGHT: 185 LBS | DIASTOLIC BLOOD PRESSURE: 64 MMHG | HEART RATE: 75 BPM

## 2021-04-29 DIAGNOSIS — R73.01 IMPAIRED FASTING GLUCOSE: Primary | ICD-10-CM

## 2021-04-29 DIAGNOSIS — I10 ESSENTIAL HYPERTENSION: ICD-10-CM

## 2021-04-29 LAB — HBA1C MFR BLD: 5.8 %

## 2021-04-29 PROCEDURE — 83036 HEMOGLOBIN GLYCOSYLATED A1C: CPT | Performed by: INTERNAL MEDICINE

## 2021-04-29 PROCEDURE — 99214 OFFICE O/P EST MOD 30 MIN: CPT | Performed by: INTERNAL MEDICINE

## 2021-05-13 PROBLEM — H02.836 DERMATOCHALASIS OF BOTH EYELIDS: Status: ACTIVE | Noted: 2021-05-13

## 2021-05-13 PROBLEM — H02.833 DERMATOCHALASIS OF BOTH EYELIDS: Status: ACTIVE | Noted: 2021-05-13

## 2021-08-22 ENCOUNTER — TELEPHONE (OUTPATIENT)
Dept: INTERNAL MEDICINE | Facility: CLINIC | Age: 66
End: 2021-08-22

## 2021-08-22 DIAGNOSIS — R05.9 COUGH: Primary | ICD-10-CM

## 2021-08-22 NOTE — TELEPHONE ENCOUNTER
Patient called after hours number due to having mild cough, congestion, lightheadedness since Tuesday of this past week after returning from trip to [place]. He travelled by airplane. He additionally notes exposure to his grandchildren who had similar symptoms but tested negative for COVID. He is vaccinated with last dose in March. Has been using mucinex DM. Advised him to continue use of mucinex and to call office in AM for appointment versus COVID test order.

## 2021-08-23 ENCOUNTER — CLINICAL SUPPORT (OUTPATIENT)
Dept: INTERNAL MEDICINE | Facility: CLINIC | Age: 66
End: 2021-08-23

## 2021-08-23 DIAGNOSIS — R05.9 COUGH: ICD-10-CM

## 2021-08-23 LAB — SARS-COV-2 RNA NOSE QL NAA+PROBE: NOT DETECTED

## 2021-08-23 PROCEDURE — U0004 COV-19 TEST NON-CDC HGH THRU: HCPCS | Performed by: INTERNAL MEDICINE

## 2021-10-12 ENCOUNTER — TELEPHONE (OUTPATIENT)
Dept: INTERNAL MEDICINE | Facility: CLINIC | Age: 66
End: 2021-10-12

## 2021-10-12 NOTE — TELEPHONE ENCOUNTER
Pt's granddaughter tested positive and they were around her Thursday. They thought she just had a stomach bug. Advised that since they don't have symptoms they don't have to test, but if they would like to they are 5 days out from exposure so could get tested now. They will talk it over and decide tomorrow.

## 2021-10-12 NOTE — TELEPHONE ENCOUNTER
Caller: Yoseph Granados Jr.    Relationship: Self    Best call back number:     What is the best time to reach you: ANYTIME    Who are you requesting to speak with (clinical staff, provider,  specific staff member): CLINICAL STAFF    What was the call regarding: PATIENT STATES THAT HIS GRANDDAUGHTER TESTED POSITIVE FOR COVID AND HIM AND HIS WIFE WAS AROUND HER BRIEFLY. PATIENT WOULD LIKE TO KNOW IF HE NEEDS TO BE TESTED. PATIENT HAS NO SYMPTOMS      Do you require a callback: YES

## 2021-10-22 ENCOUNTER — TELEPHONE (OUTPATIENT)
Dept: INTERNAL MEDICINE | Facility: CLINIC | Age: 66
End: 2021-10-22

## 2021-10-22 DIAGNOSIS — Z00.00 MEDICARE ANNUAL WELLNESS VISIT, SUBSEQUENT: Primary | ICD-10-CM

## 2021-10-22 DIAGNOSIS — I10 ESSENTIAL HYPERTENSION: ICD-10-CM

## 2021-10-22 DIAGNOSIS — R73.01 IMPAIRED FASTING GLUCOSE: ICD-10-CM

## 2021-10-22 DIAGNOSIS — E78.5 DYSLIPIDEMIA: ICD-10-CM

## 2021-10-22 DIAGNOSIS — Z12.5 SCREENING FOR PROSTATE CANCER: ICD-10-CM

## 2021-11-03 ENCOUNTER — LAB (OUTPATIENT)
Dept: LAB | Facility: HOSPITAL | Age: 66
End: 2021-11-03

## 2021-11-03 DIAGNOSIS — E78.5 DYSLIPIDEMIA: ICD-10-CM

## 2021-11-03 DIAGNOSIS — R73.01 IMPAIRED FASTING GLUCOSE: ICD-10-CM

## 2021-11-03 DIAGNOSIS — I10 ESSENTIAL HYPERTENSION: ICD-10-CM

## 2021-11-03 DIAGNOSIS — Z00.00 MEDICARE ANNUAL WELLNESS VISIT, SUBSEQUENT: ICD-10-CM

## 2021-11-03 DIAGNOSIS — Z12.5 SCREENING FOR PROSTATE CANCER: ICD-10-CM

## 2021-11-04 LAB
ALBUMIN SERPL-MCNC: 4.7 G/DL (ref 3.8–4.8)
ALBUMIN/CREAT UR: 3 MG/G CREAT (ref 0–29)
ALBUMIN/GLOB SERPL: 2.5 {RATIO} (ref 1.2–2.2)
ALP SERPL-CCNC: 72 IU/L (ref 44–121)
ALT SERPL-CCNC: 19 IU/L (ref 0–44)
APPEARANCE UR: CLEAR
AST SERPL-CCNC: 17 IU/L (ref 0–40)
BACTERIA #/AREA URNS HPF: NORMAL /[HPF]
BASOPHILS # BLD AUTO: 0 X10E3/UL (ref 0–0.2)
BASOPHILS NFR BLD AUTO: 1 %
BILIRUB SERPL-MCNC: 0.4 MG/DL (ref 0–1.2)
BILIRUB UR QL STRIP: NEGATIVE
BUN SERPL-MCNC: 18 MG/DL (ref 8–27)
BUN/CREAT SERPL: 19 (ref 10–24)
CALCIUM SERPL-MCNC: 9.2 MG/DL (ref 8.6–10.2)
CASTS URNS QL MICRO: NORMAL /LPF
CHLORIDE SERPL-SCNC: 104 MMOL/L (ref 96–106)
CHOLEST SERPL-MCNC: 184 MG/DL (ref 100–199)
CO2 SERPL-SCNC: 24 MMOL/L (ref 20–29)
COLOR UR: YELLOW
CREAT SERPL-MCNC: 0.93 MG/DL (ref 0.76–1.27)
CREAT UR-MCNC: 89.8 MG/DL
EOSINOPHIL # BLD AUTO: 0.1 X10E3/UL (ref 0–0.4)
EOSINOPHIL NFR BLD AUTO: 2 %
EPI CELLS #/AREA URNS HPF: NORMAL /HPF (ref 0–10)
ERYTHROCYTE [DISTWIDTH] IN BLOOD BY AUTOMATED COUNT: 12.9 % (ref 11.6–15.4)
GLOBULIN SER CALC-MCNC: 1.9 G/DL (ref 1.5–4.5)
GLUCOSE SERPL-MCNC: 100 MG/DL (ref 65–99)
GLUCOSE UR QL: NEGATIVE
HBA1C MFR BLD: 5.8 % (ref 4.8–5.6)
HCT VFR BLD AUTO: 45.2 % (ref 37.5–51)
HDLC SERPL-MCNC: 41 MG/DL
HGB BLD-MCNC: 15.8 G/DL (ref 13–17.7)
HGB UR QL STRIP: NEGATIVE
IMM GRANULOCYTES # BLD AUTO: 0 X10E3/UL (ref 0–0.1)
IMM GRANULOCYTES NFR BLD AUTO: 0 %
KETONES UR QL STRIP: NEGATIVE
LDLC SERPL CALC-MCNC: 109 MG/DL (ref 0–99)
LEUKOCYTE ESTERASE UR QL STRIP: NEGATIVE
LYMPHOCYTES # BLD AUTO: 1 X10E3/UL (ref 0.7–3.1)
LYMPHOCYTES NFR BLD AUTO: 14 %
MCH RBC QN AUTO: 31.2 PG (ref 26.6–33)
MCHC RBC AUTO-ENTMCNC: 35 G/DL (ref 31.5–35.7)
MCV RBC AUTO: 89 FL (ref 79–97)
MICRO URNS: NORMAL
MICRO URNS: NORMAL
MICROALBUMIN UR-MCNC: 3 UG/ML
MONOCYTES # BLD AUTO: 0.6 X10E3/UL (ref 0.1–0.9)
MONOCYTES NFR BLD AUTO: 8 %
NEUTROPHILS # BLD AUTO: 5.6 X10E3/UL (ref 1.4–7)
NEUTROPHILS NFR BLD AUTO: 75 %
NITRITE UR QL STRIP: NEGATIVE
PH UR STRIP: 5.5 [PH] (ref 5–7.5)
PLATELET # BLD AUTO: 180 X10E3/UL (ref 150–450)
POTASSIUM SERPL-SCNC: 4.5 MMOL/L (ref 3.5–5.2)
PROT SERPL-MCNC: 6.6 G/DL (ref 6–8.5)
PROT UR QL STRIP: NEGATIVE
PSA SERPL-MCNC: 1.1 NG/ML (ref 0–4)
RBC # BLD AUTO: 5.06 X10E6/UL (ref 4.14–5.8)
RBC #/AREA URNS HPF: NORMAL /HPF (ref 0–2)
SODIUM SERPL-SCNC: 142 MMOL/L (ref 134–144)
SP GR UR: 1.01 (ref 1–1.03)
TRIGL SERPL-MCNC: 192 MG/DL (ref 0–149)
TSH SERPL DL<=0.005 MIU/L-ACNC: 2.77 UIU/ML (ref 0.45–4.5)
UROBILINOGEN UR STRIP-MCNC: 0.2 MG/DL (ref 0.2–1)
VLDLC SERPL CALC-MCNC: 34 MG/DL (ref 5–40)
WBC # BLD AUTO: 7.4 X10E3/UL (ref 3.4–10.8)
WBC #/AREA URNS HPF: NORMAL /HPF (ref 0–5)

## 2021-11-06 PROBLEM — H57.813 PTOSIS OF BOTH EYEBROWS: Chronic | Status: ACTIVE | Noted: 2019-07-26

## 2021-11-06 PROBLEM — R97.20 INCREASED PROSTATE SPECIFIC ANTIGEN (PSA) VELOCITY: Chronic | Status: ACTIVE | Noted: 2019-10-17

## 2021-11-06 PROBLEM — H04.123 DRY EYES, BILATERAL: Chronic | Status: ACTIVE | Noted: 2019-07-26

## 2021-11-06 PROBLEM — I80.02 SUPERFICIAL THROMBOPHLEBITIS OF LEFT LEG: Chronic | Status: ACTIVE | Noted: 2017-12-28

## 2021-11-06 PROBLEM — M19.041 PRIMARY OSTEOARTHRITIS OF BOTH HANDS: Chronic | Status: ACTIVE | Noted: 2019-04-09

## 2021-11-06 PROBLEM — M19.042 PRIMARY OSTEOARTHRITIS OF BOTH HANDS: Chronic | Status: ACTIVE | Noted: 2019-04-09

## 2021-11-06 PROBLEM — I10 ESSENTIAL HYPERTENSION: Chronic | Status: ACTIVE | Noted: 2018-03-22

## 2021-11-06 PROBLEM — H02.833 DERMATOCHALASIS OF BOTH EYELIDS: Chronic | Status: ACTIVE | Noted: 2021-05-13

## 2021-11-06 PROBLEM — M16.11 PRIMARY OSTEOARTHRITIS OF RIGHT HIP: Chronic | Status: ACTIVE | Noted: 2017-04-09

## 2021-11-06 PROBLEM — H25.13 AGE-RELATED NUCLEAR CATARACT OF BOTH EYES: Chronic | Status: ACTIVE | Noted: 2018-04-23

## 2021-11-06 PROBLEM — M65.342 TRIGGER RING FINGER OF LEFT HAND: Chronic | Status: ACTIVE | Noted: 2020-04-21

## 2021-11-06 PROBLEM — H02.836 DERMATOCHALASIS OF BOTH EYELIDS: Chronic | Status: ACTIVE | Noted: 2021-05-13

## 2021-11-06 PROBLEM — Z00.00 MEDICARE ANNUAL WELLNESS VISIT, SUBSEQUENT: Chronic | Status: ACTIVE | Noted: 2018-10-02

## 2021-11-06 PROBLEM — J45.901 ASTHMATIC BRONCHITIS WITH ACUTE EXACERBATION: Chronic | Status: ACTIVE | Noted: 2018-02-27

## 2021-11-06 NOTE — ASSESSMENT & PLAN NOTE
BP stable 130/74; rec low Na diet, increased aerobic exercise; home BP monitoring with goal BP < 130/80

## 2021-11-06 NOTE — ASSESSMENT & PLAN NOTE
Health maintenance - COVID19 vacc done, incl 3rd dose Pfizer; flu vacc 9/21; Prevnar 8/18; PVX 10/19; Tdap 9/13 (Next Td due 2023), Zostavax 12/16; HAV and Shingrix done; colonosc 7/17, repeat 2022 with Dr. George; PSA stable 1.1; eye exam with Dr. Elizabeth 4/21 and has f/u re: eyelids and lazy eye; dental exam UTD with Dr. Kavon Manzo q6 mos; (+) seat belt use    Consultants:  Patient Care Team:  Gina Adamson MD as PCP - General  [facility], Franny Alegria MD as Consulting Physician (Dermatology)  Erwin George MD as Consulting Physician (Gastroenterology)  Paramjit Trevino MD as Consulting Physician (Orthopedic Surgery)  Remington Eller DPM as Consulting Physician (Podiatry)  Leeanne Elizabeth MD as Consulting Physician (Ophthalmology)

## 2021-11-06 NOTE — PROGRESS NOTES
ANNUAL WELLNESS VISIT    DRUG AND ALCOHOL USE      no tobacco use, alcohol intake:1 glasses of wine per week and caffeine intake: 3 cups of caffeinated coffee per day    DIET AND PHYSICAL ACTIVITY     Diet: general    Exercise: frequently   Exercise Details: walking; using stationary bike    MOOD DISORDER AND COGNITIVE SCREENING   Depression Screening Tool Used yes - see PHQ-9; components reviewed with patient; 15-min counseling done - Denies feeling blue, hopeless, depressed or not having pleasure doing things. Is looking forward to a few vacations upcoming ([place], and also reports good visit this summer to see family in [place]). Reports no concerns with sleep and no concerns with energy. Reports no concerns with appetite. Denies slow thoughts, slow movements, issues with concentration, or negative thoughts. Screening is NEG for active depression.      Anxiety Screening Tool Used yes     Mini-Cog Performed   Yes    1. Tell Patient 3 Words car tie dinesh    2. Administer Clock Test normal    3. Recall 3 words  car tie dinesh    4. Number Correct Items 3    FUNCTIONAL ABILITY AND LEVEL OF SAFETY   Hearing no hearing loss     Wears Hearing Aids No       Current Activities Independent      none  - see Funct/Cog Status Intake     Fall Risk Assessment       Has difficulty with walking or balance  No         Timed Up and Go (TUG) Test  8 sec.       If >12 sec, normal    ADVANCED DIRECTIVE    Advance Care Planning   ACP discussion was held with the patient during this visit. Patient does not have an advance directive, information provided.    Advance Care Planning Discussion:  16 min or more spent on counseling; patient does not have advanced directive and living will.  Reviewed desires for end of life care, which is to have comfort care.  Patient does not want extraordinary life-sustaining measures, including no prolonged artificial life support.  Reviewed code status options, meanings, desires. Patient  's code status is Full Code.   Encouraged patient to ensure family is aware of desires/preferences.    PAIN SCREENING Do you have pain right now? yes      If so, 1-10 scale: 4     Intermittent     Do you have pain every day? yes     Probable chronic pain: No     Recent Hospitalizations:  No recent hospitalization(s)..     MEDICATION REVIEW   - updated and reviewed (see Medication List).   - reviewed for potentially harmful drug-disease interactions in the elderly.   - reviewed for high risk medications in the elderly.   - aspirin use: Yes, 81mg QD    BMI  Body mass index is 27.87 kg/m².    Patient's Body mass index is 27.87 kg/m². indicating that he is overweight (BMI 25-29.9). Obesity-related health conditions include the following: hypertension, dyslipidemias, osteoarthritis and glc intolerance. Obesity is unchanged. BMI is is above average; BMI management plan is completed. We discussed portion control and increasing exercise..    _________________________________________________________    Chief Complaint   Patient presents with   • Medicare Wellness-subsequent   • Hypertension       History of Present Illness  66 y.o.  gentleman presents for updated phys examination and wellness visit.    Has started exercise plan in the last few weeks with regular walking, yesterday 2-1/2 miles. Also has stationary bike to use.     Reports persistent on/off hip pains, attributed to spasms, come and go without radiation, numbness/tingling. No falls or injuries. Does not impact/limit exercise.    Complains of inner right knee pain; had fall at the end of 9/21 and resulting inner knee pain and just has not resolved. Not having pain all the time and has no assoc'd swelling but comes and goes with certain movements.     Reports derm annual skin check upcoming with Dr. Amador and upcoming podiatry f/u with Dr. Velásquez in 2/22.    Review of Systems  Denies headaches, CP, palpitations, SOB, cough, abd pain, n/v/d, difficulty  "with urination, numbness/tingling, falls, mood changes, lightheadedness, hearing changes, rashes.    ROS (+) hip pains attributed to spasms without numbness/tingling or falls.    Reports new eye exam with Dr. Elizabeth at , who is interested in possibly pursuing surgery for his lazy eye.     All other ROS reviewed and negative.    Current Outpatient Medications:   •  aspirin 81 MG QD  •  budesonide-formoterol (Symbicort) 80-4.5 MCG/ACT inhaler, 2 puffs BID  •  carboxymethylcellulose (REFRESH PLUS) 0.5 % solution TID prn  •  cetirizine (zyrTEC) 10 MG QD  •  CHERRY PO QD  •  Cholecalciferol (VITAMIN D) 2000 UNITS QD  •  Cinnamon 500 MG QD  •  fluticasone (FLONASE) 50 MCG/ACT nasal spray AD  •  ibuprofen (ADVIL,MOTRIN) 600 MG BID prn  •  Multiple Vitamins-Minerals (CENTRUM SILVER ADULT 50+ PO) QD  •  Omega-3 Krill Oil 1000 MG QD  •  Probiotic Product QD  •  Turmeric 500 MG QD      VITALS:  /74   Pulse 79   Ht 174 cm (68.5\")   Wt 84.4 kg (186 lb)   SpO2 98%   BMI 27.87 kg/m²     Physical Exam  Vitals and nursing note reviewed.   Constitutional:       General: He is not in acute distress.     Appearance: Normal appearance. He is well-developed. He is not ill-appearing.   HENT:      Head: Normocephalic.      Right Ear: Ear canal and external ear normal.      Left Ear: Ear canal and external ear normal.      Nose: Nose normal.   Eyes:      Extraocular Movements: Extraocular movements intact.      Conjunctiva/sclera: Conjunctivae normal.      Pupils: Pupils are equal, round, and reactive to light.      Comments: Chronic eye deviation; wears glasses   Neck:      Vascular: No carotid bruit (bilaterally).   Cardiovascular:      Rate and Rhythm: Normal rate and regular rhythm.      Pulses: Normal pulses.      Heart sounds: Normal heart sounds.   Pulmonary:      Effort: Pulmonary effort is normal. No respiratory distress.      Breath sounds: Normal breath sounds. No wheezing or rales.   Abdominal:      General: Bowel " sounds are normal. There is no distension.      Palpations: Abdomen is soft. There is no mass.      Tenderness: There is no abdominal tenderness.   Genitourinary:     Comments: /FARNAZ exams deferred  Musculoskeletal:      Cervical back: Normal range of motion and neck supple.      Right lower leg: No edema.      Left lower leg: No edema.   Lymphadenopathy:      Cervical: No cervical adenopathy.   Skin:     General: Skin is warm and dry.      Findings: No rash.   Neurological:      Mental Status: He is alert and oriented to person, place, and time.      Cranial Nerves: No cranial nerve deficit.      Deep Tendon Reflexes: Reflexes normal.   Psychiatric:         Mood and Affect: Mood normal.         Behavior: Behavior normal.           LABS  Results for orders placed or performed in visit on 11/03/21   Hemoglobin A1c    Specimen: Blood    Blood  Release to [facility]   Result Value Ref Range    Hemoglobin A1C 5.8 (H) 4.8 - 5.6 %   PSA Screen    Specimen: Blood    Blood  Release to [facility]   Result Value Ref Range    PSA 1.1 0.0 - 4.0 ng/mL   Microalbumin / Creatinine Urine Ratio -    Specimen: Blood    Blood  Release to [facility]   Result Value Ref Range    Creatinine, Urine 89.8 Not Estab. mg/dL    Microalbumin, Urine 3.0 Not Estab. ug/mL    Microalbumin/Creatinine Ratio 3 0 - 29 mg/g creat   TSH    Specimen: Blood    Blood  Release to [facility]   Result Value Ref Range    TSH 2.770 0.450 - 4.500 uIU/mL   Lipid Panel    Specimen: Blood    Blood  Release to [facility]   Result Value Ref Range    Total Cholesterol 184 100 - 199 mg/dL    Triglycerides 192 (H) 0 - 149 mg/dL    HDL Cholesterol 41 >39 mg/dL    VLDL Cholesterol Leonardo 34 5 - 40 mg/dL    LDL Chol Calc (NIH) 109 (H) 0 - 99 mg/dL   Comprehensive Metabolic Panel    Specimen: Blood    Blood  Release to [facility]   Result Value Ref Range    Glucose 100 (H) 65 - 99 mg/dL    BUN 18 8 - 27 mg/dL    Creatinine 0.93 0.76 - 1.27 mg/dL    eGFR Non African Am 84 >59 mL/min/1.73    eGFR African Am 97 >59  mL/min/1.73    BUN/Creatinine Ratio 19 10 - 24    Sodium 142 134 - 144 mmol/L    Potassium 4.5 3.5 - 5.2 mmol/L    Chloride 104 96 - 106 mmol/L    Total CO2 24 20 - 29 mmol/L    Calcium 9.2 8.6 - 10.2 mg/dL    Total Protein 6.6 6.0 - 8.5 g/dL    Albumin 4.7 3.8 - 4.8 g/dL    Globulin 1.9 1.5 - 4.5 g/dL    A/G Ratio 2.5 (H) 1.2 - 2.2    Total Bilirubin 0.4 0.0 - 1.2 mg/dL    Alkaline Phosphatase 72 44 - 121 IU/L    AST (SGOT) 17 0 - 40 IU/L    ALT (SGPT) 19 0 - 44 IU/L   Microscopic Examination -    Blood  Release to [facility]   Result Value Ref Range    WBC, UA 0-5 0 - 5 /hpf    RBC, UA None seen 0 - 2 /hpf    Epithelial Cells (non renal) None seen 0 - 10 /hpf    Casts None seen None seen /lpf    Bacteria, UA None seen None seen/Few   Urinalysis With Microscopic -    Specimen: Blood    Blood  Release to [facility]   Result Value Ref Range    Specific Gravity, UA 1.013 1.005 - 1.030    pH, UA 5.5 5.0 - 7.5    Color, UA Yellow Yellow    Appearance, UA Clear Clear    Leukocytes, UA Negative Negative    Protein Negative Negative/Trace    Glucose, UA Negative Negative    Ketones Negative Negative    Blood, UA Negative Negative    Bilirubin, UA Negative Negative    Urobilinogen, UA 0.2 0.2 - 1.0 mg/dL    Nitrite, UA Negative Negative    Microscopic Examination Comment     Microscopic Examination See below:    CBC & Differential    Specimen: Blood    Blood  Release to [facility]   Result Value Ref Range    WBC 7.4 3.4 - 10.8 x10E3/uL    RBC 5.06 4.14 - 5.80 x10E6/uL    Hemoglobin 15.8 13.0 - 17.7 g/dL    Hematocrit 45.2 37.5 - 51.0 %    MCV 89 79 - 97 fL    MCH 31.2 26.6 - 33.0 pg    MCHC 35.0 31.5 - 35.7 g/dL    RDW 12.9 11.6 - 15.4 %    Platelets 180 150 - 450 x10E3/uL    Neutrophil Rel % 75 Not Estab. %    Lymphocyte Rel % 14 Not Estab. %    Monocyte Rel % 8 Not Estab. %    Eosinophil Rel % 2 Not Estab. %    Basophil Rel % 1 Not Estab. %    Neutrophils Absolute 5.6 1.4 - 7.0 x10E3/uL    Lymphocytes Absolute 1.0 0.7 - 3.1 x10E3/uL     Monocytes Absolute 0.6 0.1 - 0.9 x10E3/uL    Eosinophils Absolute 0.1 0.0 - 0.4 x10E3/uL    Basophils Absolute 0.0 0.0 - 0.2 x10E3/uL    Immature Granulocyte Rel % 0 Not Estab. %    Immature Grans Absolute 0.0 0.0 - 0.1 x10E3/uL     4/21 A1C 5.8      ECG 12 Lead    Date/Time: 11/9/2021 8:30 AM  Performed by: Gina Adamson MD  Authorized by: Gina Adamson MD   Comparison: compared with previous ECG from 10/29/2020  Similar to previous ECG  Rhythm: sinus rhythm  Rate: normal  BPM: 75  Conduction: right bundle branch block  ST Segments: ST segments normal  T Waves: T waves normal  QRS axis: left  Other findings: left atrial abnormality  Clinical impression comment: stable EKG              ASSESSMENT/PLAN    Diagnoses and all orders for this visit:    1. Medicare annual wellness visit, subsequent (Primary)  Assessment & Plan:  Health maintenance - COVID19 vacc done, incl 3rd dose Pfizer; flu vacc 9/21; Prevnar 8/18; PVX 10/19; Tdap 9/13 (Next Td due 2023), Zostavax 12/16; HAV and Shingrix done; colonosc 7/17, repeat 2022 with Dr. George; PSA stable 1.1; eye exam with Dr. Elizabeth 4/21 and has f/u re: eyelids and lazy eye; dental exam UTD with Dr. Kavon Manzo q6 mos; (+) seat belt use    Consultants:  Patient Care Team:  Gina Adamson MD as PCP - Franny Treviño MD as Consulting Physician (Dermatology)  Erwin George MD as Consulting Physician (Gastroenterology)  Paramjit Trevino MD as Consulting Physician (Orthopedic Surgery)  Remington Eller DPM as Consulting Physician (Podiatry)  Leeanne Elizabeth MD as Consulting Physician (Ophthalmology)          2. Dyslipidemia  Assessment & Plan:  Lipids bord with  but elevated  (goal < 150); decrease saturated fats and cholesterol in the diet; repeat lipids in 6 mos        3. Hypertension  Assessment & Plan:  BP stable 130/74; rec low Na diet, increased aerobic exercise; home BP monitoring with goal BP <  130/80      Orders:  -     ECG 12 Lead    4. Impaired fasting glucose  Assessment & Plan:  BG control stable with A1C 5.8; encouraged reg phys activity to decr insulin resistance, moderation in unhealthy starches/sweets; f/u A1C in 6 mos      Orders:  -     Hemoglobin A1c; Future    5. Asthmatic bronchitis  Assessment & Plan:  asx but winter season upcoming, therefore rec resume symbicort 80/4.5 2 puffs BID, gargle/spit after puffs #3, 3RF    Orders:  -     budesonide-formoterol (Symbicort) 80-4.5 MCG/ACT inhaler; 2 puffs BID, gargle and spit after puffs  Dispense: 3 each; Refill: 3    6. Osteoarthritis of right hip  Assessment & Plan:  Reporting bilat hip pains and stiffness, attributed to muscle spasms; discussed importance of adequate warm up and cool down with exercise; big picture - rec consideration for yoga or pilates for strengthening, flexibility, core; fall precautions      7. Chronic pain of right knee  Comments:  likely increased laxity due to previous strain; rec brace for times with prolonged walking or phys exertion; fall precautions; ok for prn ibuprofen      FOLLOW-UP  RTC 6 mos with A1C, lipids (escribed)    Electronically signed by:    Gina Adamson MD, FACP  11/09/2021

## 2021-11-06 NOTE — ASSESSMENT & PLAN NOTE
Lipids bord with  but elevated  (goal < 150); decrease saturated fats and cholesterol in the diet; repeat lipids in 6 mos

## 2021-11-09 ENCOUNTER — OFFICE VISIT (OUTPATIENT)
Dept: INTERNAL MEDICINE | Facility: CLINIC | Age: 66
End: 2021-11-09

## 2021-11-09 VITALS
WEIGHT: 186 LBS | BODY MASS INDEX: 27.55 KG/M2 | HEART RATE: 79 BPM | DIASTOLIC BLOOD PRESSURE: 74 MMHG | SYSTOLIC BLOOD PRESSURE: 130 MMHG | HEIGHT: 69 IN | OXYGEN SATURATION: 98 %

## 2021-11-09 DIAGNOSIS — J45.41 MODERATE PERSISTENT ASTHMATIC BRONCHITIS WITH ACUTE EXACERBATION: Chronic | ICD-10-CM

## 2021-11-09 DIAGNOSIS — G89.29 CHRONIC PAIN OF RIGHT KNEE: ICD-10-CM

## 2021-11-09 DIAGNOSIS — R73.01 IMPAIRED FASTING GLUCOSE: Chronic | ICD-10-CM

## 2021-11-09 DIAGNOSIS — E78.5 DYSLIPIDEMIA: Chronic | ICD-10-CM

## 2021-11-09 DIAGNOSIS — I10 ESSENTIAL HYPERTENSION: Chronic | ICD-10-CM

## 2021-11-09 DIAGNOSIS — Z00.00 MEDICARE ANNUAL WELLNESS VISIT, SUBSEQUENT: Primary | Chronic | ICD-10-CM

## 2021-11-09 DIAGNOSIS — M25.561 CHRONIC PAIN OF RIGHT KNEE: ICD-10-CM

## 2021-11-09 DIAGNOSIS — M16.11 PRIMARY OSTEOARTHRITIS OF RIGHT HIP: Chronic | ICD-10-CM

## 2021-11-09 PROCEDURE — G0439 PPPS, SUBSEQ VISIT: HCPCS | Performed by: INTERNAL MEDICINE

## 2021-11-09 PROCEDURE — 99397 PER PM REEVAL EST PAT 65+ YR: CPT | Performed by: INTERNAL MEDICINE

## 2021-11-09 PROCEDURE — 96160 PT-FOCUSED HLTH RISK ASSMT: CPT | Performed by: INTERNAL MEDICINE

## 2021-11-09 PROCEDURE — 1125F AMNT PAIN NOTED PAIN PRSNT: CPT | Performed by: INTERNAL MEDICINE

## 2021-11-09 PROCEDURE — 99497 ADVNCD CARE PLAN 30 MIN: CPT | Performed by: INTERNAL MEDICINE

## 2021-11-09 PROCEDURE — 1160F RVW MEDS BY RX/DR IN RCRD: CPT | Performed by: INTERNAL MEDICINE

## 2021-11-09 PROCEDURE — 93000 ELECTROCARDIOGRAM COMPLETE: CPT | Performed by: INTERNAL MEDICINE

## 2021-11-09 PROCEDURE — 1170F FXNL STATUS ASSESSED: CPT | Performed by: INTERNAL MEDICINE

## 2021-11-09 RX ORDER — BUDESONIDE AND FORMOTEROL FUMARATE DIHYDRATE 80; 4.5 UG/1; UG/1
AEROSOL RESPIRATORY (INHALATION)
Qty: 3 EACH | Refills: 3 | Status: SHIPPED | OUTPATIENT
Start: 2021-11-09 | End: 2022-11-12 | Stop reason: SDUPTHER

## 2021-11-10 NOTE — ASSESSMENT & PLAN NOTE
Reporting bilat hip pains and stiffness, attributed to muscle spasms; discussed importance of adequate warm up and cool down with exercise; big picture - rec consideration for yoga or pilates for strengthening, flexibility, core; fall precautions

## 2021-11-10 NOTE — ASSESSMENT & PLAN NOTE
asx but winter season upcoming, therefore rec resume symbicort 80/4.5 2 puffs BID, gargle/spit after puffs #3, 3RF

## 2022-05-04 DIAGNOSIS — E78.5 DYSLIPIDEMIA: Primary | ICD-10-CM

## 2022-05-04 DIAGNOSIS — R73.01 IMPAIRED FASTING GLUCOSE: ICD-10-CM

## 2022-05-05 NOTE — PROGRESS NOTES
"Chief Complaint   Patient presents with   • Impaired Fasting Glucose   • Hypertension       History of Present Illness  67 y.o.  gentleman presents for sugar and cholesterol follow-up. Had coffee this morning and 3 ibuprofen. Not checking BPs at home.    Patient shares photographs he has taken in nature.    Review of Systems  Denies CP, palpitations, SOB. All other ROS reviewed and negative.    Current Outpatient Medications:   •  aspirin 81 MG QD  •  budesonide-formoterol (Symbicort) 80-4.5 MCG/ACT inhaler, 2 puffs BID  •  carboxymethylcellulose (REFRESH PLUS) 0.5 % solution, 3 (Three) TID prn  •  cetirizine (zyrTEC) 10 MG QD  •  CHERRY PO QD  •  Cholecalciferol (VITAMIN D) 2000 UNITS QD  •  Cinnamon 500 MG QD  •  ELDERBERRY QD  •  fluticasone (FLONASE) 50 MCG/ACT nasal spray AD  •  ibuprofen (ADVIL,MOTRIN) 600 MG prn  •  CENTRUM SILVER ADULT 50+ QD  •  Omega-3 Krill Oil 1000 MG QD  •  Probiotic Product QD  •  Turmeric 500 MG QD      VITALS:  /64 (BP Location: Left arm, Patient Position: Sitting)   Pulse 79   Ht 174 cm (68.5\")   Wt 84.8 kg (187 lb)   SpO2 98%   BMI 28.02 kg/m²     Physical Exam  Vitals and nursing note reviewed.   Constitutional:       General: He is not in acute distress.     Appearance: Normal appearance. He is not ill-appearing.   Eyes:      Extraocular Movements: Extraocular movements intact.      Conjunctiva/sclera: Conjunctivae normal.   Pulmonary:      Effort: Pulmonary effort is normal. No respiratory distress.   Neurological:      Mental Status: He is alert and oriented to person, place, and time. Mental status is at baseline.   Psychiatric:         Mood and Affect: Mood normal.         Behavior: Behavior normal.         LABS  Results for orders placed or performed in visit on 05/06/22   Hemoglobin A1c    Specimen: Blood    Blood  Release to [facility]   Result Value Ref Range    Hemoglobin A1C 5.70 (H) 4.80 - 5.60 %   Lipid Panel    Specimen: Blood    Blood  Release to [facility] "   Result Value Ref Range    Total Cholesterol 192 0 - 200 mg/dL    Triglycerides 124 0 - 150 mg/dL    HDL Cholesterol 45 40 - 60 mg/dL    VLDL Cholesterol Leonardo 22 5 - 40 mg/dL    LDL Chol Calc ([facility]) 125 (H) 0 - 100 mg/dL     11/21 A1C 5.8, ,     ASSESSMENT/PLAN    Diagnoses and all orders for this visit:    1. Impaired fasting glucose (Primary)  Assessment & Plan:  BG control stable with A1C 5.7; encouraged reg phys activity to decr insulin resistance, moderation in unhealthy starches/sweets; f/u A1C in 6 mos        2. Dyslipidemia  Assessment & Plan:  Lipids acceptable but worsened with , but TG back to normal 124 with goal TG < 150, goal LDL < 130, ideally < 100; no meds currently; decrease saturated fats and cholesterol in the diet; repeat lipids in 6 mos        3. Hypertension  Assessment & Plan:  BP elevated, improved on repeat but still elevated; on no meds; rec low Na diet, increased aerobic exercise; rec home BP monitoring with goal BP < 130/80 and f/u earlier if consistently > 140/90; f/u in 6 mos          FOLLOW-UP  1. Health maintenance - COVID19 vacc done, incl 4th dose COVID19 booster  2. RTC for AWV 11/14/22; fasting labs prior to appt (CBC, CMP, TSH, lipids, UA/micro, PSA, A1C, microalb) and BP check    Electronically signed by:    Gina Adamson MD, FACP  05/09/2022

## 2022-05-05 NOTE — ASSESSMENT & PLAN NOTE
BP elevated, improved on repeat but still elevated; on no meds; rec low Na diet, increased aerobic exercise; rec home BP monitoring with goal BP < 130/80 and f/u earlier if consistently > 140/90; f/u in 6 mos

## 2022-05-05 NOTE — ASSESSMENT & PLAN NOTE
Lipids acceptable but worsened with , but TG back to normal 124 with goal TG < 150, goal LDL < 130, ideally < 100; no meds currently; decrease saturated fats and cholesterol in the diet; repeat lipids in 6 mos

## 2022-05-06 ENCOUNTER — LAB (OUTPATIENT)
Dept: LAB | Facility: HOSPITAL | Age: 67
End: 2022-05-06

## 2022-05-06 DIAGNOSIS — R73.01 IMPAIRED FASTING GLUCOSE: ICD-10-CM

## 2022-05-06 DIAGNOSIS — E78.5 DYSLIPIDEMIA: ICD-10-CM

## 2022-05-06 LAB
CHOLEST SERPL-MCNC: 192 MG/DL (ref 0–200)
HBA1C MFR BLD: 5.7 % (ref 4.8–5.6)
HDLC SERPL-MCNC: 45 MG/DL (ref 40–60)
LDLC SERPL CALC-MCNC: 125 MG/DL (ref 0–100)
TRIGL SERPL-MCNC: 124 MG/DL (ref 0–150)
VLDLC SERPL CALC-MCNC: 22 MG/DL (ref 5–40)

## 2022-05-09 ENCOUNTER — OFFICE VISIT (OUTPATIENT)
Dept: INTERNAL MEDICINE | Facility: CLINIC | Age: 67
End: 2022-05-09

## 2022-05-09 VITALS
HEART RATE: 79 BPM | HEIGHT: 69 IN | DIASTOLIC BLOOD PRESSURE: 64 MMHG | BODY MASS INDEX: 27.7 KG/M2 | OXYGEN SATURATION: 98 % | WEIGHT: 187 LBS | SYSTOLIC BLOOD PRESSURE: 144 MMHG

## 2022-05-09 DIAGNOSIS — R73.01 IMPAIRED FASTING GLUCOSE: Primary | Chronic | ICD-10-CM

## 2022-05-09 DIAGNOSIS — I10 ESSENTIAL HYPERTENSION: Chronic | ICD-10-CM

## 2022-05-09 DIAGNOSIS — E78.5 DYSLIPIDEMIA: Chronic | ICD-10-CM

## 2022-05-09 PROCEDURE — 99214 OFFICE O/P EST MOD 30 MIN: CPT | Performed by: INTERNAL MEDICINE

## 2022-06-12 NOTE — PROGRESS NOTES
"Chief Complaint   Patient presents with   • Hip Pain   • Leg Pain       History of Present Illness  67 y.o.  gentleman presents for further eval of bilat hip pain L>R with occ radiation and numbness down the left leg towards the outer left knee, noting limited relief with ibuprofen 600mg and ice. Notes pain when sleeping on either side but also low back discomfort with lying on his back.    Reports sometimes hip pain just with sitting; with standing, achy pain is in the low back just above the buttocks. Reports increased pain with slow walking but no limitations with brisk walking. Has been able to cont 4-mi walking exercise without pain or limitations.    Pain has progressively worsened and now is interfering with sleep because of inability to find comfortable position.    Has ortho appt in 2 wks with Dr. Conrad.    Review of Systems  ROS (+) for bilat hip pain and occ left leg pain as noted. All other ROS reviewed and negative.      Current Outpatient Medications:   •  aspirin 81 MG QD  •  budesonide-formoterol (Symbicort) 80-4.5 MCG/ACT inhaler, 2 puffs BID  •  carboxymethylcellulose (REFRESH PLUS) 0.5 % solution TID  •  cetirizine (zyrTEC) 10 MG qD  •  CHERRY QD  •  Cholecalciferol (VITAMIN D) 2000 UNITS QD  •  Cinnamon 500 MG QD  •  ELDERBERRY QD  •  fluticasone (FLONASE) 50 MCG/ACT nasal spray AD  •  ibuprofen (ADVIL,MOTRIN) 600 MG prn  •  CENTRUM SILVER ADULT 50+ QD  •  Omega-3 Krill Oil 1000 MG QD  •  Probiotic Product QD   •  Turmeric 500 MG QD      VITALS:  /66 (BP Location: Left arm, Patient Position: Sitting)   Pulse 78   Temp 98 °F (36.7 °C)   Ht 174 cm (68.5\")   Wt 83.9 kg (185 lb)   SpO2 98%   BMI 27.72 kg/m²     Physical Exam  Vitals and nursing note reviewed.   Constitutional:       General: He is not in acute distress.     Appearance: Normal appearance. He is not ill-appearing.   Eyes:      Extraocular Movements: Extraocular movements intact.      Conjunctiva/sclera: Conjunctivae " normal.   Pulmonary:      Effort: Pulmonary effort is normal. No respiratory distress.   Musculoskeletal:         General: Tenderness (mild tend with palpation of the SI joints bilaterally; tend at the bilat buttock areas with KAYCEE test) present.      Comments: bilat hips FROM, no tend with palpation of the greater trochanters bilaterally   Neurological:      Mental Status: He is alert and oriented to person, place, and time. Mental status is at baseline.   Psychiatric:         Mood and Affect: Mood normal.         Behavior: Behavior normal.         LABS  Results for orders placed or performed in visit on 05/06/22   Hemoglobin A1c    Specimen: Blood    Blood  Release to [facility]   Result Value Ref Range    Hemoglobin A1C 5.70 (H) 4.80 - 5.60 %   Lipid Panel    Specimen: Blood    Blood  Release to [facility]   Result Value Ref Range    Total Cholesterol 192 0 - 200 mg/dL    Triglycerides 124 0 - 150 mg/dL    HDL Cholesterol 45 40 - 60 mg/dL    VLDL Cholesterol Leonardo 22 5 - 40 mg/dL    LDL Chol Calc (NIH) 125 (H) 0 - 100 mg/dL         ASSESSMENT/PLAN    Diagnoses and all orders for this visit:    1. Bilateral hip pain (Primary)  Comments:  more likely back than hip joint pathology at this time; RX pred taper #30, 0RF; referral to PT ([facility] per pt); rec prn heat; has ortho appt in 2 wks  Orders:  -     predniSONE (DELTASONE) 10 MG tablet; 4 PO QD for 3 days, then 3 PO QD for 3 days, then 2 PO QD for 3 days, then 1 PO QD for 3 days, then stop  Dispense: 30 tablet; Refill: 0    2. Radiculopathy of leg  Comments:  mild radiation of left hip pain down to left knee; PT referral; has ortho appt in 2 wks    3. SI (sacroiliac) joint dysfunction  Comments:  discussed not sitting in same position for prolonged periods of time    4. Hypertension  Assessment & Plan:  BP stable 132/66        FOLLOW-UP  1. Health maintenance - COVID19 vacc done, incl 4th dose booster; knows colonosc is due with Dr. George  2. RTC prn; next wellness/PE  scheduled 11/14/22; fasting labs prior to appt (CBC, CMP, TSH, lipids, UA/micro, PSA, A1C, microalb)    Electronically signed by:    Gina Adamson MD, FACP  06/13/2022

## 2022-06-13 ENCOUNTER — OFFICE VISIT (OUTPATIENT)
Dept: INTERNAL MEDICINE | Facility: CLINIC | Age: 67
End: 2022-06-13

## 2022-06-13 VITALS
HEIGHT: 69 IN | WEIGHT: 185 LBS | SYSTOLIC BLOOD PRESSURE: 132 MMHG | DIASTOLIC BLOOD PRESSURE: 66 MMHG | OXYGEN SATURATION: 98 % | TEMPERATURE: 98 F | HEART RATE: 78 BPM | BODY MASS INDEX: 27.4 KG/M2

## 2022-06-13 DIAGNOSIS — M25.552 BILATERAL HIP PAIN: Primary | ICD-10-CM

## 2022-06-13 DIAGNOSIS — M54.10 RADICULOPATHY OF LEG: ICD-10-CM

## 2022-06-13 DIAGNOSIS — M25.551 BILATERAL HIP PAIN: Primary | ICD-10-CM

## 2022-06-13 DIAGNOSIS — I10 ESSENTIAL HYPERTENSION: Chronic | ICD-10-CM

## 2022-06-13 DIAGNOSIS — M53.3 SI (SACROILIAC) JOINT DYSFUNCTION: ICD-10-CM

## 2022-06-13 PROCEDURE — 99214 OFFICE O/P EST MOD 30 MIN: CPT | Performed by: INTERNAL MEDICINE

## 2022-06-13 RX ORDER — PREDNISONE 10 MG/1
TABLET ORAL
Qty: 30 TABLET | Refills: 0 | Status: SHIPPED | OUTPATIENT
Start: 2022-06-13 | End: 2022-06-25

## 2022-08-04 ENCOUNTER — OFFICE VISIT (OUTPATIENT)
Dept: INTERNAL MEDICINE | Facility: CLINIC | Age: 67
End: 2022-08-04

## 2022-08-04 VITALS
BODY MASS INDEX: 27.42 KG/M2 | WEIGHT: 183 LBS | OXYGEN SATURATION: 97 % | HEART RATE: 90 BPM | DIASTOLIC BLOOD PRESSURE: 90 MMHG | SYSTOLIC BLOOD PRESSURE: 164 MMHG

## 2022-08-04 DIAGNOSIS — L24.7 IRRITANT CONTACT DERMATITIS DUE TO PLANTS, EXCEPT FOOD: Primary | ICD-10-CM

## 2022-08-04 PROCEDURE — 96372 THER/PROPH/DIAG INJ SC/IM: CPT | Performed by: PHYSICIAN ASSISTANT

## 2022-08-04 PROCEDURE — 99213 OFFICE O/P EST LOW 20 MIN: CPT | Performed by: PHYSICIAN ASSISTANT

## 2022-08-04 RX ORDER — METHYLPREDNISOLONE 4 MG/1
TABLET ORAL
Qty: 21 TABLET | Refills: 0 | Status: SHIPPED | OUTPATIENT
Start: 2022-08-04 | End: 2022-08-10

## 2022-08-04 RX ORDER — METHYLPREDNISOLONE ACETATE 40 MG/ML
40 INJECTION, SUSPENSION INTRA-ARTICULAR; INTRALESIONAL; INTRAMUSCULAR; SOFT TISSUE ONCE
Status: COMPLETED | OUTPATIENT
Start: 2022-08-04 | End: 2022-08-04

## 2022-08-04 RX ADMIN — METHYLPREDNISOLONE ACETATE 40 MG: 40 INJECTION, SUSPENSION INTRA-ARTICULAR; INTRALESIONAL; INTRAMUSCULAR; SOFT TISSUE at 14:59

## 2022-08-04 NOTE — PROGRESS NOTES
Chief Complaint   Patient presents with   • Rash     Acute        Subjective     Yoseph Granados Jr. is a 67 y.o. male.        History of Present Illness     Pt has a rash that started 5 days ago on his right forearm. Has now spread to scattered areas on his left arm and upper leg. Tried some poison ivy wash, calamine, benadryl gel and oral        Current Outpatient Medications:   •  aspirin 81 MG tablet, Take 1 tablet by mouth Daily., Disp: , Rfl:   •  budesonide-formoterol (Symbicort) 80-4.5 MCG/ACT inhaler, 2 puffs BID, gargle and spit after puffs, Disp: 3 each, Rfl: 3  •  carboxymethylcellulose (REFRESH PLUS) 0.5 % solution, 3 (Three) Times a Day As Needed for Dry Eyes., Disp: , Rfl:   •  cetirizine (zyrTEC) 10 MG tablet, Take 10 mg by mouth Daily., Disp: , Rfl:   •  HE PO, Take  by mouth., Disp: , Rfl:   •  Cholecalciferol (VITAMIN D) 2000 UNITS capsule, Take 1 capsule by mouth daily., Disp: , Rfl:   •  Cinnamon 500 MG tablet, Take 1 tablet by mouth daily., Disp: , Rfl:   •  ELDERBERRY PO, Take  by mouth., Disp: , Rfl:   •  fluticasone (FLONASE) 50 MCG/ACT nasal spray, 2 sprays into the nostril(s) as directed by provider Daily., Disp: 16 g, Rfl: 5  •  ibuprofen (ADVIL,MOTRIN) 600 MG tablet, Take 1 tablet by mouth 2 (two) times a day as needed., Disp: , Rfl:   •  Multiple Vitamins-Minerals (CENTRUM SILVER ADULT 50+ PO), Take 1 tablet by mouth daily., Disp: , Rfl:   •  Omega-3 Krill Oil 1000 MG capsule, Take 1 tablet by mouth daily., Disp: , Rfl:   •  Probiotic Product (PROBIOTIC-10 PO), Take  by mouth., Disp: , Rfl:   •  Turmeric 500 MG capsule, Take 500 mg by mouth Daily., Disp: , Rfl:   •  methylPREDNISolone (Medrol) 4 MG dose pack, follow package directions, Disp: 21 tablet, Rfl: 0     PMFSH  The following portions of the patient's history were reviewed and updated as appropriate: allergies, current medications, past family history, past medical history, past social history, past surgical history and  problem list.    Review of Systems   Constitutional: Negative for activity change, appetite change, fatigue, fever and unexpected weight change.   HENT: Negative for facial swelling, mouth sores and trouble swallowing.    Eyes: Negative for pain, discharge, itching and visual disturbance.   Respiratory: Negative for chest tightness, shortness of breath and wheezing.    Cardiovascular: Negative for chest pain and palpitations.   Gastrointestinal: Negative for abdominal pain, diarrhea, nausea and vomiting.   Endocrine: Negative.    Genitourinary: Negative.    Musculoskeletal: Negative for joint swelling.   Skin: Positive for rash.   Allergic/Immunologic: Negative.    Neurological: Negative for seizures and syncope.   Hematological: Does not bruise/bleed easily.   Psychiatric/Behavioral: Negative.        Objective   /90   Pulse 90   Wt 83 kg (183 lb)   SpO2 97%   BMI 27.42 kg/m²     Physical Exam  Vitals and nursing note reviewed.   Constitutional:       General: He is not in acute distress.     Appearance: He is well-developed. He is not diaphoretic.   HENT:      Head: Normocephalic and atraumatic.      Right Ear: External ear normal.      Left Ear: External ear normal.   Eyes:      General: No scleral icterus.        Right eye: No discharge.         Left eye: No discharge.      Conjunctiva/sclera: Conjunctivae normal.      Pupils: Pupils are equal, round, and reactive to light.   Pulmonary:      Effort: Pulmonary effort is normal.   Musculoskeletal:         General: Normal range of motion.      Cervical back: Normal range of motion and neck supple.   Skin:     General: Skin is warm and dry.      Findings: Rash present.      Comments: Scattered erythematous papular rash; primarily on right forearm; other lesions on left arm, right upper thigh   Neurological:      Mental Status: He is alert and oriented to person, place, and time.      Motor: No abnormal muscle tone.   Psychiatric:         Behavior: Behavior  normal.         Thought Content: Thought content normal.         Judgment: Judgment normal.         ASSESSMENT/PLAN    Diagnoses and all orders for this visit:    1. Irritant contact dermatitis due to plants, except food (Primary)  Comments:  Due to significant discomfort from itching, administered depomedrol 40 mg IM in office. Start medrol dose pack tomorrow.  Orders:  -     methylPREDNISolone acetate (DEPO-medrol) injection 40 mg  -     methylPREDNISolone (Medrol) 4 MG dose pack; follow package directions  Dispense: 21 tablet; Refill: 0             Return if symptoms worsen or fail to improve, for Next scheduled follow up.

## 2022-08-04 NOTE — PROGRESS NOTES
Immunization  Immunization performed in left upper outer quadrant by Rosa Bolaños MA. Patient tolerated the procedure well without complications.  08/04/22   Rosa Bolaños MA

## 2022-08-22 ENCOUNTER — PATIENT MESSAGE (OUTPATIENT)
Dept: INTERNAL MEDICINE | Facility: CLINIC | Age: 67
End: 2022-08-22

## 2022-08-22 DIAGNOSIS — M48.061 DEGENERATIVE LUMBAR SPINAL STENOSIS: Primary | ICD-10-CM

## 2022-08-22 PROBLEM — M16.0 PRIMARY OSTEOARTHRITIS OF BOTH HIPS: Status: ACTIVE | Noted: 2017-04-09

## 2022-09-27 RX ORDER — GABAPENTIN 300 MG/1
300 CAPSULE ORAL 2 TIMES DAILY
COMMUNITY
Start: 2022-09-20 | End: 2023-03-03

## 2022-09-27 NOTE — TELEPHONE ENCOUNTER
"From: Yoseph Granados Jr.  To: Gina Adamson MD  Sent: 8/22/2022 10:46 AM EDT  Subject: MRI    Hi Dr. Adamson,   I had my MRI done late on Friday afternoon and I requested that they send you a copy of the report. Please let me know if you do not receive it. I have a copy of the scans that cover my lower back and each of my hips to take with me for my follow-up visit (to be scheduled after he reviews the scan report) with Dr. Conrad or if instructed by his office to take to another specialist if the results indicate an issue with my back (there was an \"!\" within a triangle on one of the back images). We will have to see what the report says about that. I'll keep you posted on how things progress. Have a great week!    Yoseph Granados  "

## 2022-11-08 DIAGNOSIS — E78.5 DYSLIPIDEMIA: Chronic | ICD-10-CM

## 2022-11-08 DIAGNOSIS — I10 ESSENTIAL HYPERTENSION: Chronic | ICD-10-CM

## 2022-11-08 DIAGNOSIS — R73.01 IMPAIRED FASTING GLUCOSE: Chronic | ICD-10-CM

## 2022-11-08 DIAGNOSIS — Z00.00 MEDICARE ANNUAL WELLNESS VISIT, SUBSEQUENT: Primary | Chronic | ICD-10-CM

## 2022-11-08 DIAGNOSIS — Z12.5 SCREENING FOR PROSTATE CANCER: ICD-10-CM

## 2022-11-10 ENCOUNTER — LAB (OUTPATIENT)
Dept: LAB | Facility: HOSPITAL | Age: 67
End: 2022-11-10

## 2022-11-10 DIAGNOSIS — I10 ESSENTIAL HYPERTENSION: Chronic | ICD-10-CM

## 2022-11-10 DIAGNOSIS — R73.01 IMPAIRED FASTING GLUCOSE: Chronic | ICD-10-CM

## 2022-11-10 DIAGNOSIS — Z00.00 MEDICARE ANNUAL WELLNESS VISIT, SUBSEQUENT: Chronic | ICD-10-CM

## 2022-11-10 DIAGNOSIS — E78.5 DYSLIPIDEMIA: Chronic | ICD-10-CM

## 2022-11-10 DIAGNOSIS — Z12.5 SCREENING FOR PROSTATE CANCER: ICD-10-CM

## 2022-11-11 LAB
ALBUMIN SERPL-MCNC: 4.6 G/DL (ref 3.5–5.2)
ALBUMIN/CREAT UR: 4 MG/G CREAT (ref 0–29)
ALBUMIN/GLOB SERPL: 2.4 G/DL
ALP SERPL-CCNC: 82 U/L (ref 39–117)
ALT SERPL-CCNC: 16 U/L (ref 1–41)
APPEARANCE UR: CLEAR
AST SERPL-CCNC: 19 U/L (ref 1–40)
BACTERIA #/AREA URNS HPF: NORMAL /HPF
BASOPHILS # BLD AUTO: 0.04 10*3/MM3 (ref 0–0.2)
BASOPHILS NFR BLD AUTO: 0.6 % (ref 0–1.5)
BILIRUB SERPL-MCNC: 0.4 MG/DL (ref 0–1.2)
BILIRUB UR QL STRIP: NEGATIVE
BUN SERPL-MCNC: 13 MG/DL (ref 8–23)
BUN/CREAT SERPL: 16.3 (ref 7–25)
CALCIUM SERPL-MCNC: 9.1 MG/DL (ref 8.6–10.5)
CASTS URNS MICRO: NORMAL
CHLORIDE SERPL-SCNC: 101 MMOL/L (ref 98–107)
CHOLEST SERPL-MCNC: 179 MG/DL (ref 0–200)
CO2 SERPL-SCNC: 27.7 MMOL/L (ref 22–29)
COLOR UR: YELLOW
CREAT SERPL-MCNC: 0.8 MG/DL (ref 0.76–1.27)
CREAT UR-MCNC: 88.7 MG/DL
EGFRCR SERPLBLD CKD-EPI 2021: 95.8 ML/MIN/1.73
EOSINOPHIL # BLD AUTO: 0.15 10*3/MM3 (ref 0–0.4)
EOSINOPHIL NFR BLD AUTO: 2.3 % (ref 0.3–6.2)
EPI CELLS #/AREA URNS HPF: NORMAL /HPF
ERYTHROCYTE [DISTWIDTH] IN BLOOD BY AUTOMATED COUNT: 12.8 % (ref 12.3–15.4)
GLOBULIN SER CALC-MCNC: 1.9 GM/DL
GLUCOSE SERPL-MCNC: 89 MG/DL (ref 65–99)
GLUCOSE UR QL STRIP: NEGATIVE
HBA1C MFR BLD: 5.9 % (ref 4.8–5.6)
HCT VFR BLD AUTO: 48.5 % (ref 37.5–51)
HDLC SERPL-MCNC: 43 MG/DL (ref 40–60)
HGB BLD-MCNC: 16 G/DL (ref 13–17.7)
HGB UR QL STRIP: NEGATIVE
IMM GRANULOCYTES # BLD AUTO: 0.02 10*3/MM3 (ref 0–0.05)
IMM GRANULOCYTES NFR BLD AUTO: 0.3 % (ref 0–0.5)
KETONES UR QL STRIP: NEGATIVE
LDLC SERPL CALC-MCNC: 107 MG/DL (ref 0–100)
LEUKOCYTE ESTERASE UR QL STRIP: NEGATIVE
LYMPHOCYTES # BLD AUTO: 1.25 10*3/MM3 (ref 0.7–3.1)
LYMPHOCYTES NFR BLD AUTO: 18.9 % (ref 19.6–45.3)
MCH RBC QN AUTO: 31.1 PG (ref 26.6–33)
MCHC RBC AUTO-ENTMCNC: 33 G/DL (ref 31.5–35.7)
MCV RBC AUTO: 94.4 FL (ref 79–97)
MICROALBUMIN UR-MCNC: 3.8 UG/ML
MONOCYTES # BLD AUTO: 0.58 10*3/MM3 (ref 0.1–0.9)
MONOCYTES NFR BLD AUTO: 8.7 % (ref 5–12)
NEUTROPHILS # BLD AUTO: 4.59 10*3/MM3 (ref 1.7–7)
NEUTROPHILS NFR BLD AUTO: 69.2 % (ref 42.7–76)
NITRITE UR QL STRIP: NEGATIVE
NRBC BLD AUTO-RTO: 0 /100 WBC (ref 0–0.2)
PH UR STRIP: 6 [PH] (ref 5–8)
PLATELET # BLD AUTO: 196 10*3/MM3 (ref 140–450)
POTASSIUM SERPL-SCNC: 4.2 MMOL/L (ref 3.5–5.2)
PROT SERPL-MCNC: 6.5 G/DL (ref 6–8.5)
PROT UR QL STRIP: NEGATIVE
PSA SERPL-MCNC: 0.8 NG/ML (ref 0–4)
RBC # BLD AUTO: 5.14 10*6/MM3 (ref 4.14–5.8)
RBC #/AREA URNS HPF: NORMAL /HPF
SODIUM SERPL-SCNC: 141 MMOL/L (ref 136–145)
SP GR UR STRIP: 1.01 (ref 1–1.03)
TRIGL SERPL-MCNC: 165 MG/DL (ref 0–150)
TSH SERPL DL<=0.005 MIU/L-ACNC: 4.01 UIU/ML (ref 0.27–4.2)
UROBILINOGEN UR STRIP-MCNC: NORMAL MG/DL
VLDLC SERPL CALC-MCNC: 29 MG/DL (ref 5–40)
WBC # BLD AUTO: 6.63 10*3/MM3 (ref 3.4–10.8)
WBC #/AREA URNS HPF: NORMAL /HPF

## 2022-11-12 PROBLEM — M16.0 PRIMARY OSTEOARTHRITIS OF BOTH HIPS: Chronic | Status: ACTIVE | Noted: 2017-04-09

## 2022-11-12 PROBLEM — M48.061 DEGENERATIVE LUMBAR SPINAL STENOSIS: Chronic | Status: ACTIVE | Noted: 2022-08-22

## 2022-11-13 NOTE — PROGRESS NOTES
ANNUAL WELLNESS VISIT    DRUG AND ALCOHOL USE      no alcohol use, no tobacco use and caffeine intake: 3 cups of caffeinated coffee per day    DIET AND PHYSICAL ACTIVITY     Diet: general    Exercise: frequently   Exercise Details: walking    MOOD DISORDER AND COGNITIVE SCREENING     PHQ-2 Depression Screening - components reviewed with patient; screening is negative for active depression.    Little interest or pleasure in doing things? 0   Feeling down, depressed, or hopeless? 0-->not at all   PHQ-2 Total Score 0      Anxiety Screening Tool Used YES     AUDIT screening 1     Mini-Cog Performed   Yes    1. Tell Patient 3 Words car apple pen    2. Administer Clock Test normal    3. Recall 3 words  car apple pen    4. Number Correct Items 3    FUNCTIONAL ABILITY AND LEVEL OF SAFETY   Hearing no hearing loss     Wears Hearing Aids No       Current Activities Independent      none  - see Funct/Cog Status Intake     Fall Risk Assessment       Has difficulty with walking or balance  No         Timed Up and Go (TUG) Test  8 sec.       If >12 sec, normal    ADVANCED DIRECTIVE  Advance Care Planning   ACP discussion was held with the patient during this visit. Patient does not have an advance directive, information provided.  Advance Care Planning Discussion:  16 min or more spent on counseling; patient does not have advanced directive and living will.  Reviewed desires for end of life care, which is to have comfort care.  Patient does not want extraordinary life-sustaining measures, including no prolonged artificial life support. Encouraged patient to ensure family is aware of desires/preferences. Confirmed wife is his healthcare surrogate. ACP pamphlet provided to patient - sections that need to be completed were reviewed with patient in detail. All questions answered. Request that he being us back copy once notarized.    PAIN SCREENING Do you have pain right now? yes      If so, 1-10 scale: 3     Intermittent     Do you  have pain every day? yes     Probable chronic pain: No     Recent Hospitalizations:  No recent hospitalization(s)..     MEDICATION REVIEW   - updated and reviewed (see Medication List).   - reviewed for potentially harmful drug-disease interactions in the elderly.   - reviewed for high risk medications in the elderly.   - aspirin use: Yes, 81mg QD    BMI  Body mass index is 28.17 kg/m².  BMI is >= 25 and <30. (Overweight) The following options were offered after discussion;: exercise counseling/recommendations and nutrition counseling/recommendations       _________________________________________________________    Chief Complaint   Patient presents with   • Medicare Wellness-subsequent   • Hypertension       History of Present Illness  67 y.o.  gentleman presents for updated phys examination and wellness visit as well as f/u on BP, cholesterol, and sugars.    Ongoing back pain issues with radiation into legs and numbness in both feet, about the same on both sides. Notes shot in back with limited improvement in shooting leg pains but still with feet numbness. Notes some improvement in feet pain with addition of gabapentin per Dr. Bernal but it is not helping his back pain. Currently in process of seeking 2nd opinion for consideration of surgery. Had found a neurosurgeon in [place] bu the is retiring. Looking at a neurosurgeon at .     Complains of right ear fullness comes and goes. No pain and no change in hearing. Reports left ear asx.    Review of Systems  Denies headaches, visual changes, CP, palpitations, SOB, cough, abd pain, n/v/d, difficulty with urination, numbness/tingling, falls, mood changes, lightheadedness, hearing changes, rashes.    ROS (+) for right ear fullness without hearing change, drainage, pain.    ROS (+) for ongoing back pain with numbness/tingling in legs as noted.     All other ROS reviewed and negative.      Current Outpatient Medications:   •  aspirin 81 MG QD  •   "budesonide-formoterol (Symbicort) 80-4.5 MCG/ACT inhaler, 2 puffs BID (inconsistent, only uses seasonally)  •  carboxymethylcellulose (REFRESH PLUS) 0.5 % soln prn   •  cetirizine (zyrTEC) 10 MG QD  •  CHERRY QD  •  Cholecalciferol (VITAMIN D) 2000 UNITS QD  •  Cinnamon 500 MG QD  •  ELDERBERRY QD  •  fluticasone (FLONASE) 50 MCG/ACT nasal spray AD  •  gabapentin (NEURONTIN) 300 MG BID  •  ibuprofen (ADVIL,MOTRIN) 600 MG prn  •  CENTRUM SILVER ADULT 50+ QD  •  Omega-3 Krill Oil 1000 MG QD  •  Probiotic Product QD  •  Turmeric 500 MG QD      VITALS:  /68 (BP Location: Left arm, Patient Position: Sitting)   Pulse 84   Ht 174 cm (68.5\")   Wt 85.3 kg (188 lb)   SpO2 98%   BMI 28.17 kg/m²     Physical Exam  Vitals and nursing note reviewed.   Constitutional:       General: He is not in acute distress.     Appearance: Normal appearance. He is well-developed.   HENT:      Head: Normocephalic.      Right Ear: Tympanic membrane, ear canal and external ear normal. There is no impacted cerumen.      Left Ear: Tympanic membrane, ear canal and external ear normal. There is no impacted cerumen.      Nose: Nose normal.   Eyes:      Extraocular Movements: Extraocular movements intact.      Conjunctiva/sclera: Conjunctivae normal.      Pupils: Pupils are equal, round, and reactive to light.      Comments: Wearing glasses; baseline amblyopia   Neck:      Vascular: No carotid bruit (bilaterally).   Cardiovascular:      Rate and Rhythm: Normal rate and regular rhythm.      Heart sounds: Normal heart sounds.   Pulmonary:      Effort: Pulmonary effort is normal. No respiratory distress.      Breath sounds: Normal breath sounds. No wheezing or rales.   Abdominal:      General: Bowel sounds are normal. There is no distension.      Palpations: Abdomen is soft. There is no mass.      Tenderness: There is no abdominal tenderness.   Genitourinary:     Comments: /FARNAZ deferred  Musculoskeletal:      Cervical back: Normal range of " motion and neck supple.   Lymphadenopathy:      Cervical: No cervical adenopathy.   Skin:     General: Skin is warm and dry.      Findings: Lesion (multiple pea-sized and larger SQ nodules throughout abdomen, nontender, no rashes) present. No rash.   Neurological:      Mental Status: He is alert and oriented to person, place, and time.      Cranial Nerves: No cranial nerve deficit.      Deep Tendon Reflexes: Reflexes normal.   Psychiatric:         Mood and Affect: Mood normal.         Behavior: Behavior normal.         LABS  Results for orders placed or performed in visit on 11/10/22   Hemoglobin A1c    Specimen: Blood    Blood  Release to ghulam   Result Value Ref Range    Hemoglobin A1C 5.90 (H) 4.80 - 5.60 %   PSA Screen    Specimen: Blood    Blood  Release to ghulam   Result Value Ref Range    PSA 0.796 0.000 - 4.000 ng/mL   Microalbumin / Creatinine Urine Ratio -    Specimen: Blood    Blood  Release to ghulam   Result Value Ref Range    Creatinine, Urine 88.7 Not Estab. mg/dL    Microalbumin, Urine 3.8 Not Estab. ug/mL    Microalbumin/Creatinine Ratio 4 0 - 29 mg/g creat   TSH    Specimen: Blood    Blood  Release to ghulam   Result Value Ref Range    TSH 4.010 0.270 - 4.200 uIU/mL   Lipid Panel    Specimen: Blood    Blood  Release to guhlam   Result Value Ref Range    Total Cholesterol 179 0 - 200 mg/dL    Triglycerides 165 (H) 0 - 150 mg/dL    HDL Cholesterol 43 40 - 60 mg/dL    VLDL Cholesterol Leonardo 29 5 - 40 mg/dL    LDL Chol Calc (NIH) 107 (H) 0 - 100 mg/dL   Comprehensive Metabolic Panel    Specimen: Blood    Blood  Release to ghulam   Result Value Ref Range    Glucose 89 65 - 99 mg/dL    BUN 13 8 - 23 mg/dL    Creatinine 0.80 0.76 - 1.27 mg/dL    EGFR Result 95.8 >60.0 mL/min/1.73    BUN/Creatinine Ratio 16.3 7.0 - 25.0    Sodium 141 136 - 145 mmol/L    Potassium 4.2 3.5 - 5.2 mmol/L    Chloride 101 98 - 107 mmol/L    Total CO2 27.7 22.0 - 29.0 mmol/L    Calcium 9.1 8.6 - 10.5 mg/dL    Total Protein 6.5 6.0 - 8.5 g/dL     Albumin 4.60 3.50 - 5.20 g/dL    Globulin 1.9 gm/dL    A/G Ratio 2.4 g/dL    Total Bilirubin 0.4 0.0 - 1.2 mg/dL    Alkaline Phosphatase 82 39 - 117 U/L    AST (SGOT) 19 1 - 40 U/L    ALT (SGPT) 16 1 - 41 U/L   Microscopic Examination -    Blood  Release to ghulam   Result Value Ref Range    WBC, UA 0-2 /HPF    RBC, UA 0-2 /HPF    Epithelial Cells (non renal) 0-2 /HPF    Cast Type Comment     Bacteria, UA Comment None Seen /HPF   Urinalysis With Microscopic -    Specimen: Blood    Blood  Release to ghulam   Result Value Ref Range    Specific Gravity, UA 1.014 1.005 - 1.030    pH, UA 6.0 5.0 - 8.0    Color, UA Yellow     Appearance, UA Clear Clear    Leukocytes, UA Negative Negative    Protein Negative Negative    Glucose, UA Negative Negative    Ketones Negative Negative    Blood, UA Negative Negative    Bilirubin, UA Negative Negative    Urobilinogen, UA Comment     Nitrite, UA Negative Negative   CBC & Differential    Specimen: Blood    Blood  Release to ghulam   Result Value Ref Range    WBC 6.63 3.40 - 10.80 10*3/mm3    RBC 5.14 4.14 - 5.80 10*6/mm3    Hemoglobin 16.0 13.0 - 17.7 g/dL    Hematocrit 48.5 37.5 - 51.0 %    MCV 94.4 79.0 - 97.0 fL    MCH 31.1 26.6 - 33.0 pg    MCHC 33.0 31.5 - 35.7 g/dL    RDW 12.8 12.3 - 15.4 %    Platelets 196 140 - 450 10*3/mm3    Neutrophil Rel % 69.2 42.7 - 76.0 %    Lymphocyte Rel % 18.9 (L) 19.6 - 45.3 %    Monocyte Rel % 8.7 5.0 - 12.0 %    Eosinophil Rel % 2.3 0.3 - 6.2 %    Basophil Rel % 0.6 0.0 - 1.5 %    Neutrophils Absolute 4.59 1.70 - 7.00 10*3/mm3    Lymphocytes Absolute 1.25 0.70 - 3.10 10*3/mm3    Monocytes Absolute 0.58 0.10 - 0.90 10*3/mm3    Eosinophils Absolute 0.15 0.00 - 0.40 10*3/mm3    Basophils Absolute 0.04 0.00 - 0.20 10*3/mm3    Immature Granulocyte Rel % 0.3 0.0 - 0.5 %    Immature Grans Absolute 0.02 0.00 - 0.05 10*3/mm3    nRBC 0.0 0.0 - 0.2 /100 WBC     5/6/22 , A1C 5.7      ECG 12 Lead    Date/Time: 11/14/2022 8:30 AM  Performed by: Juan Diego  MD Gina  Authorized by: Gina Adamson MD   Comparison: compared with previous ECG from 11/9/2021  Similar to previous ECG  Rhythm: sinus rhythm  Rate: normal  BPM: 75  Conduction: right bundle branch block  ST Segments: ST segments normal  T Waves: T waves normal  QRS axis: left  Other findings: left atrial abnormality  Clinical impression comment: stable EKG            8/19/22 MRI bilat hips - mod degenerative changes, bilateral labral tears and paralabral cysts L>R    ASSESSMENT/PLAN    Diagnoses and all orders for this visit:    1. Medicare annual wellness visit, subsequent (Primary)  Assessment & Plan:  Health maintenance - COVID19 vacc done, incl 4th dose booster and new COVID19 vacc; flu vacc 9/22; Prevnar 8/18; PVX 10/19; Tdap 9/13 (next Td due 2023), HAV and Shingrix done; colonosc due (last 7/17, repeat 2022 with Dr. George); PSA stable 0.796; eye exam with  Dr. Elizabeth 5/22; dental exam q6 mos, pending 12/22 per pt; (+) seat belt use      Consultants:  Patient Care Team:  Gina Adamson MD as PCP - General  MassaFranny MD as Consulting Physician (Dermatology)  Erwin George MD as Consulting Physician (Gastroenterology)  Paramjit Trevino MD as Consulting Physician (Orthopedic Surgery)  Remington Eller DPM as Consulting Physician (Podiatry)  Leeanne Elizabeth MD as Consulting Physician (Ophthalmology)  Kavon Manzo DMD (Dental General Practice)  Yoseph Bernal MD as Consulting Physician (Orthopedic Surgery)          2. Hypertension  Assessment & Plan:  BP elevated, improved but still elevated on repeat but he reports normotensive BPs at home; rec low Na diet, increased aerobic exercise; rec home BP monitoring with goal BP < 130/80; f/u in 6 mos    Orders:  -     ECG 12 Lead    3. Dyslipidemia  Assessment & Plan:  Stable bord lipids with improved  but bord TGs 165; goal TG < 150; decrease saturated fats and cholesterol in the diet; repeat  lipids in 12 mos        4. Impaired fasting glucose  Assessment & Plan:  BG control stable with A1C 5.9; encouraged reg phys activity to decr insulin resistance, moderation in unhealthy starches/sweets; f/u A1C in 6 mos        5. Asthmatic bronchitis  Assessment & Plan:  Unable to update PFTs due to COVID19 pandemic restrictions; asx but winter season upcoming, therefore rec resume symbicort 80/4.5 2 puffs BID, gargle/spit after puffs #3, 3RF    Orders:  -     budesonide-formoterol (Symbicort) 80-4.5 MCG/ACT inhaler; 2 puffs BID, gargle and spit after puffs  Dispense: 3 each; Refill: 3    6. Sensation of fullness in right ear  Comments:  likely d/t eustachian tube dysfunction; cont zyrtec; reviewed correct use of flonase; rec auto-insufflation 20x TID; no signs of infection    7. Lipoma of torso  Assessment & Plan:  Multiple lipomas on abdomen; stable      8. Degenerative lumbar spinal stenosis  Assessment & Plan:  Limited relief with epidural injections; limited relief of radicular pain with gabapentin 300mg BID per Dr. Bernal; fall precautions; patient is considering surgery and wants 2nd opinion- he is looking at [facility], but I also recommended considering consultation at [facility]        FOLLOW-UP  RTC 6 mos with a1C and BP check    Electronically signed by:    Gina Adamson MD, FACP  11/14/2022

## 2022-11-13 NOTE — ASSESSMENT & PLAN NOTE
Stable bord lipids with improved  but bord TGs 165; goal TG < 150; decrease saturated fats and cholesterol in the diet; repeat lipids in 12 mos

## 2022-11-13 NOTE — ASSESSMENT & PLAN NOTE
Health maintenance - COVID19 vacc done, incl 4th dose booster and new COVID19 vacc; flu vacc 9/22; Prevnar 8/18; PVX 10/19; Tdap 9/13 (next Td due 2023), HAV and Shingrix done; colonosc due (last 7/17, repeat 2022 with Dr. George); PSA stable 0.796; eye exam with  Dr. Elizabeth 5/22; dental exam q6 mos, pending 12/22 per pt; (+) seat belt use      Consultants:  Patient Care Team:  Gina Adamson MD as PCP - General  [facility], Franny Alegria MD as Consulting Physician (Dermatology)  Erwin George MD as Consulting Physician (Gastroenterology)  Paramjit Trevino MD as Consulting Physician (Orthopedic Surgery)  Remington Eller DPM as Consulting Physician (Podiatry)  Leeanne Elizabeth MD as Consulting Physician (Ophthalmology)  Kavon Manzo, ROSSANA (Dental General Practice)  Yoseph Bernal MD as Consulting Physician (Orthopedic Surgery)

## 2022-11-13 NOTE — ASSESSMENT & PLAN NOTE
BP elevated, improved but still elevated on repeat but he reports normotensive BPs at home; rec low Na diet, increased aerobic exercise; rec home BP monitoring with goal BP < 130/80; f/u in 6 mos

## 2022-11-13 NOTE — ASSESSMENT & PLAN NOTE
Unable to update PFTs due to COVID19 pandemic restrictions; asx but winter season upcoming, therefore rec resume symbicort 80/4.5 2 puffs BID, gargle/spit after puffs #3, 3RF

## 2022-11-14 ENCOUNTER — OFFICE VISIT (OUTPATIENT)
Dept: INTERNAL MEDICINE | Facility: CLINIC | Age: 67
End: 2022-11-14

## 2022-11-14 VITALS
HEIGHT: 69 IN | WEIGHT: 188 LBS | BODY MASS INDEX: 27.85 KG/M2 | DIASTOLIC BLOOD PRESSURE: 68 MMHG | SYSTOLIC BLOOD PRESSURE: 146 MMHG | HEART RATE: 84 BPM | OXYGEN SATURATION: 98 %

## 2022-11-14 DIAGNOSIS — I10 ESSENTIAL HYPERTENSION: Chronic | ICD-10-CM

## 2022-11-14 DIAGNOSIS — D17.1 LIPOMA OF TORSO: Chronic | ICD-10-CM

## 2022-11-14 DIAGNOSIS — Z00.00 MEDICARE ANNUAL WELLNESS VISIT, SUBSEQUENT: Primary | Chronic | ICD-10-CM

## 2022-11-14 DIAGNOSIS — R73.01 IMPAIRED FASTING GLUCOSE: Chronic | ICD-10-CM

## 2022-11-14 DIAGNOSIS — E78.5 DYSLIPIDEMIA: Chronic | ICD-10-CM

## 2022-11-14 DIAGNOSIS — H93.8X1 SENSATION OF FULLNESS IN RIGHT EAR: ICD-10-CM

## 2022-11-14 DIAGNOSIS — M48.061 DEGENERATIVE LUMBAR SPINAL STENOSIS: Chronic | ICD-10-CM

## 2022-11-14 DIAGNOSIS — J45.41 MODERATE PERSISTENT ASTHMATIC BRONCHITIS WITH ACUTE EXACERBATION: Chronic | ICD-10-CM

## 2022-11-14 PROCEDURE — 2014F MENTAL STATUS ASSESS: CPT | Performed by: INTERNAL MEDICINE

## 2022-11-14 PROCEDURE — G0439 PPPS, SUBSEQ VISIT: HCPCS | Performed by: INTERNAL MEDICINE

## 2022-11-14 PROCEDURE — 99497 ADVNCD CARE PLAN 30 MIN: CPT | Performed by: INTERNAL MEDICINE

## 2022-11-14 PROCEDURE — 1158F ADVNC CARE PLAN TLK DOCD: CPT | Performed by: INTERNAL MEDICINE

## 2022-11-14 PROCEDURE — 3008F BODY MASS INDEX DOCD: CPT | Performed by: INTERNAL MEDICINE

## 2022-11-14 PROCEDURE — 93000 ELECTROCARDIOGRAM COMPLETE: CPT | Performed by: INTERNAL MEDICINE

## 2022-11-14 PROCEDURE — 1125F AMNT PAIN NOTED PAIN PRSNT: CPT | Performed by: INTERNAL MEDICINE

## 2022-11-14 PROCEDURE — 1160F RVW MEDS BY RX/DR IN RCRD: CPT | Performed by: INTERNAL MEDICINE

## 2022-11-14 PROCEDURE — 1170F FXNL STATUS ASSESSED: CPT | Performed by: INTERNAL MEDICINE

## 2022-11-14 PROCEDURE — 99397 PER PM REEVAL EST PAT 65+ YR: CPT | Performed by: INTERNAL MEDICINE

## 2022-11-14 PROCEDURE — 96160 PT-FOCUSED HLTH RISK ASSMT: CPT | Performed by: INTERNAL MEDICINE

## 2022-11-14 RX ORDER — BUDESONIDE AND FORMOTEROL FUMARATE DIHYDRATE 80; 4.5 UG/1; UG/1
AEROSOL RESPIRATORY (INHALATION)
Qty: 3 EACH | Refills: 3 | Status: SHIPPED | OUTPATIENT
Start: 2022-11-14

## 2022-11-15 NOTE — ASSESSMENT & PLAN NOTE
Limited relief with epidural injections; limited relief of radicular pain with gabapentin 300mg BID per Dr. Bernal; fall precautions; patient is considering surgery and wants 2nd opinion- he is looking at [facility], but I also recommended considering consultation at [facility]

## 2023-03-03 ENCOUNTER — PATIENT MESSAGE (OUTPATIENT)
Dept: INTERNAL MEDICINE | Facility: CLINIC | Age: 68
End: 2023-03-03
Payer: MEDICARE

## 2023-03-06 NOTE — TELEPHONE ENCOUNTER
From: Yoseph Granados Jr.  To: Gina Adamosn  Sent: 3/3/2023 8:18 AM EST  Subject: Spinal Stenosis Update    Hi Dr. Adamson,  Hope that you are doing well! I just wanted to give you with an up-date on my spinal stenosis treatment. During our trip to [facility] in early February, I took myself off of the gabapentin. My symptoms actually improved some, must have been the beach! However, the symptoms remained, particularly when sitting, walking and sleeping. While walking, I was getting numb from the waist down in both legs. Sometimes it made it difficult to walk.    This past Tuesday, I received my second injection from Dr. Bernal. I think he hit the right nerve (place) as both legs twitched as he administered the shot. So far, the shot seems to be working better this time. At least I am more comfortable while sitting, plus my legs do not go numb while walking.    However, I am still experiencing pain (dull to sharp) in the left hip/gluteus medius area while walking, sitting and sleeping. Plus, tightness/soreness in right side of lower back. We will see how my symptoms are in a couple more days. Then I will let Dr. Bernal how things are going.    [employer] [place], my recovery nurse was a former neighbor from [place].     Stay safe today!    Yoseph Granados

## 2023-05-14 NOTE — ASSESSMENT & PLAN NOTE
BP elevated today, borderline on repeat; rec low Na diet, increased aerobic exercise; home BP monitoring with goal BP < 130/80

## 2023-05-14 NOTE — ASSESSMENT & PLAN NOTE
S/p injections per Dr. Bernal but reports no relief after the last one in 2/23; off of gabapentin; with persistent sxs of LLE radicular pain and numbness both legs, referral to pain clinic Dr. Decker for further eval/recs; will update MRI L-spine for that visit; if no options from pain clinic, then patient is interested in consultation with neurosurg Dr. Valentin

## 2023-05-14 NOTE — PROGRESS NOTES
"Chief Complaint   Patient presents with   • Impaired Fasting Glucose   • Hypertension       History of Present Illness  68 y.o.  gentleman presents for BP and sugar follow-up as well as f/u back pain related to spinal stenosis.    Has not been checking BPs regularly but previously in the 120s systolic.    Notes LBP radiating down the left leg and numbness in both legs with mowing. Notes ineffectiveness of ibuprofen. Remains on turmeric. Reports spasm in the left hip area that cause insomnia. Cannot find comfortable position. Notes no sxs change on or off gabapentin; therefore, he weaned himself off of the medication.    Reports 2 injections per Dr. Bernal, the last of which did not help at all. Has another injection schedule in 6/23.    Review of Systems  ROS (+) for persistent LBP with radiation into legs L>R and numbness both legs. Denies falls. All other ROS reviewed and negative.    Current Outpatient Medications:   •  aspirin 81 MG QD  •  budesonide-formoterol (Symbicort) 80-4.5 MCG/ACT inhaler, 2 puffs BID  •  carboxymethylcellulose (REFRESH PLUS) 0.5 % solution prn  •  cetirizine (zyrTEC) 10 MG QD  •  CHERRY QD  •  Cholecalciferol (VITAMIN D) 2000 UNITS QD  •  Cinnamon 500 MG QD  •  ELDERBERRY QD  •  fluticasone (FLONASE) 50 MCG/ACT nasal spray AD  •  ibuprofen (ADVIL,MOTRIN) 600 MG prn  •  CENTRUM SILVER ADULT 50+ QD  •  Omega-3 Krill Oil 1000 MG QD  •  Probiotic Product QD  •  Turmeric 500 MG QD    VITALS:  /68   Pulse 82   Ht 174 cm (68.5\")   Wt 83.5 kg (184 lb)   SpO2 98%   BMI 27.57 kg/m²     Physical Exam  Vitals and nursing note reviewed.   Constitutional:       General: He is not in acute distress.     Appearance: Normal appearance. He is not ill-appearing.   Eyes:      Extraocular Movements: Extraocular movements intact.      Conjunctiva/sclera: Conjunctivae normal.   Pulmonary:      Effort: Pulmonary effort is normal. No respiratory distress.   Neurological:      Mental Status: " He is alert and oriented to person, place, and time. Mental status is at baseline.   Psychiatric:         Mood and Affect: Mood normal.         Behavior: Behavior normal.         LABS  Results for orders placed or performed in visit on 05/15/23   POC Glycosylated Hemoglobin (Hb A1C)    Specimen: Blood   Result Value Ref Range    Hemoglobin A1C 5.8 %    Lot Number [id number]     Expiration Date 01/31/25 11/22 A1C 5.9    ASSESSMENT/PLAN    Diagnoses and all orders for this visit:    1. Impaired fasting glucose (Primary)  Assessment & Plan:  BG control stable with A1C 5.8; encouraged reg phys activity to decr insulin resistance, moderation in unhealthy starches/sweets; f/u A1C in 6 mos      Orders:  -     POC Glycosylated Hemoglobin (Hb A1C)    2. Hypertension  Assessment & Plan:  BP elevated today, borderline on repeat; rec low Na diet, increased aerobic exercise; home BP monitoring with goal BP < 130/80        3. Degenerative lumbar spinal stenosis  Assessment & Plan:  S/p injections per Dr. Bernal but reports no relief after the last one in 2/23; off of gabapentin; with persistent sxs of LLE radicular pain and numbness both legs, referral to pain clinic Dr. Decker for further eval/recs; will update MRI L-spine for that visit; if no options from pain clinic, then patient is interested in consultation with neurosurg Dr. Valentin    Orders:  -     Ambulatory Referral to Pain Management Clinic  -     MRI Lumbar Spine Without Contrast; Future    4. Vaccine counseling  Comments:  rec 2nd dose bivalent COVID19 vacc with h/o asthmatic bronchitis and age > 65; he will get with his wife; he is also amenable to RSV vacc in the fall      FOLLOW-UP  1. Health maintenance - COVID19 vacc done, including bivalent vaccine; rec consideration for 2nd dose of bivalent vaccine in light of h/o asthmatic bronchitis  2. RTC for AWV 11/16/23; fasting labs prior to appt (CBC, CMP, TSH, lipids, UA/micro, PSA, A1C,  microalb)    Electronically signed by:    Gina Adamson MD, FACP  05/15/2023

## 2023-05-15 ENCOUNTER — OFFICE VISIT (OUTPATIENT)
Dept: INTERNAL MEDICINE | Facility: CLINIC | Age: 68
End: 2023-05-15
Payer: MEDICARE

## 2023-05-15 VITALS
OXYGEN SATURATION: 98 % | WEIGHT: 184 LBS | HEIGHT: 69 IN | DIASTOLIC BLOOD PRESSURE: 68 MMHG | BODY MASS INDEX: 27.25 KG/M2 | HEART RATE: 82 BPM | SYSTOLIC BLOOD PRESSURE: 134 MMHG

## 2023-05-15 DIAGNOSIS — I10 ESSENTIAL HYPERTENSION: Chronic | ICD-10-CM

## 2023-05-15 DIAGNOSIS — R73.01 IMPAIRED FASTING GLUCOSE: Primary | Chronic | ICD-10-CM

## 2023-05-15 DIAGNOSIS — M48.061 DEGENERATIVE LUMBAR SPINAL STENOSIS: Chronic | ICD-10-CM

## 2023-05-15 DIAGNOSIS — Z71.85 VACCINE COUNSELING: ICD-10-CM

## 2023-05-15 LAB
EXPIRATION DATE: NORMAL
HBA1C MFR BLD: 5.8 %
Lab: NORMAL

## 2023-05-31 ENCOUNTER — OFFICE VISIT (OUTPATIENT)
Dept: PAIN MEDICINE | Facility: CLINIC | Age: 68
End: 2023-05-31

## 2023-05-31 VITALS
TEMPERATURE: 97 F | OXYGEN SATURATION: 95 % | DIASTOLIC BLOOD PRESSURE: 92 MMHG | HEIGHT: 69 IN | SYSTOLIC BLOOD PRESSURE: 152 MMHG | RESPIRATION RATE: 16 BRPM | BODY MASS INDEX: 27.78 KG/M2 | HEART RATE: 73 BPM | WEIGHT: 187.6 LBS

## 2023-05-31 DIAGNOSIS — M48.062 SPINAL STENOSIS OF LUMBAR REGION WITH NEUROGENIC CLAUDICATION: Primary | ICD-10-CM

## 2023-05-31 PROCEDURE — 3080F DIAST BP >= 90 MM HG: CPT | Performed by: STUDENT IN AN ORGANIZED HEALTH CARE EDUCATION/TRAINING PROGRAM

## 2023-05-31 PROCEDURE — 99204 OFFICE O/P NEW MOD 45 MIN: CPT | Performed by: STUDENT IN AN ORGANIZED HEALTH CARE EDUCATION/TRAINING PROGRAM

## 2023-05-31 PROCEDURE — 3077F SYST BP >= 140 MM HG: CPT | Performed by: STUDENT IN AN ORGANIZED HEALTH CARE EDUCATION/TRAINING PROGRAM

## 2023-05-31 RX ORDER — FEXOFENADINE HYDROCHLORIDE 60 MG/1
TABLET, FILM COATED ORAL EVERY 12 HOURS
COMMUNITY

## 2023-05-31 RX ORDER — GABAPENTIN 300 MG/1
CAPSULE ORAL
COMMUNITY
Start: 2023-01-13

## 2023-05-31 RX ORDER — TRIAMCINOLONE ACETONIDE 0.25 MG/G
CREAM TOPICAL
COMMUNITY
Start: 2023-01-12
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
## 2023-06-02 ENCOUNTER — PATIENT ROUNDING (BHMG ONLY) (OUTPATIENT)
Dept: PAIN MEDICINE | Facility: CLINIC | Age: 68
End: 2023-06-02

## 2023-06-02 NOTE — PROGRESS NOTES
A MY-CHART MESSAGE HAS BEEN SENT TO THE PATIENT FOR PATIENT ROUNDING WITH [facility].

## 2023-06-14 ENCOUNTER — HOSPITAL ENCOUNTER (OUTPATIENT)
Dept: MRI IMAGING | Facility: HOSPITAL | Age: 68
Discharge: HOME | End: 2023-06-14
Admitting: INTERNAL MEDICINE
Payer: MEDICARE

## 2023-06-14 DIAGNOSIS — M48.061 DEGENERATIVE LUMBAR SPINAL STENOSIS: Chronic | ICD-10-CM

## 2023-06-14 PROBLEM — M48.062 SPINAL STENOSIS OF LUMBAR REGION WITH NEUROGENIC CLAUDICATION: Status: ACTIVE | Noted: 2022-08-22

## 2023-06-14 PROCEDURE — 72148 MRI LUMBAR SPINE W/O DYE: CPT

## 2023-07-07 PROBLEM — H52.13 MYOPIA OF BOTH EYES: Status: ACTIVE | Noted: 2021-05-11

## 2023-07-07 PROBLEM — M53.86 SCIATICA ASSOCIATED WITH DISORDER OF LUMBAR SPINE: Status: ACTIVE | Noted: 2023-07-07

## 2023-07-07 PROBLEM — H02.831 DERMATOCHALASIS OF BOTH UPPER EYELIDS: Status: ACTIVE | Noted: 2022-05-16

## 2023-07-07 PROBLEM — H02.834 DERMATOCHALASIS OF BOTH UPPER EYELIDS: Status: ACTIVE | Noted: 2022-05-16

## 2023-07-07 PROBLEM — H43.811 POSTERIOR VITREOUS DETACHMENT OF RIGHT EYE: Status: ACTIVE | Noted: 2021-05-11

## 2023-07-07 PROBLEM — H52.4 PRESBYOPIA: Status: ACTIVE | Noted: 2022-05-16

## 2023-07-07 PROBLEM — H04.123 DRY EYE SYNDROME OF BOTH EYES: Status: ACTIVE | Noted: 2022-05-16

## 2023-07-07 PROBLEM — H52.4 HYPEROPIA WITH PRESBYOPIA: Status: ACTIVE | Noted: 2021-05-11

## 2023-07-07 PROBLEM — H52.00 HYPEROPIA WITH PRESBYOPIA: Status: ACTIVE | Noted: 2021-05-11

## 2023-07-07 PROBLEM — H04.123 DRY EYE SYNDROME OF BILATERAL LACRIMAL GLANDS: Status: ACTIVE | Noted: 2021-05-11

## 2023-07-07 PROBLEM — J30.2 SEASONAL ALLERGIES: Status: ACTIVE | Noted: 2021-05-11

## 2023-07-07 PROBLEM — H52.13 HIGH MYOPIA, BILATERAL: Status: ACTIVE | Noted: 2021-05-11

## 2023-07-07 NOTE — H&P (VIEW-ONLY)
NAME: KENNY SIMPSON JR.   DOS: 2023  : 1955  PCP: Gina Adamson MD    Chief Complaint:    Chief Complaint   Patient presents with   • Low back & bilateral leg pain       History of Present Illness:  68 y.o. male   I saw this 68-year-old male in neurosurgical consultation he is a very pleasant and active gentleman who presents with a chronic history of back pain most recently he is experience worsening neurogenic claudication left-sided buttock hip and leg pain with intractable numbness he denies bowel bladder issues and is here for evaluation he is quite athletic    PMHX  Allergies:  Allergies   Allergen Reactions   • Cosamin Ds [Glucosamine-Chondroitin] Hives   • Erythromycin Hives   • Glucosamine-Chondroitin Hives   • Naproxen Hives and Unknown (See Comments)   • Sulfa Antibiotics Hives   • Sulfamethoxazole Hives   • Trimethoprim Hives     Medications    Current Outpatient Medications:   •  aspirin 81 MG tablet, Take 1 tablet by mouth Daily., Disp: , Rfl:   •  budesonide-formoterol (Symbicort) 80-4.5 MCG/ACT inhaler, 2 puffs BID, gargle and spit after puffs, Disp: 3 each, Rfl: 3  •  carboxymethylcellulose (REFRESH PLUS) 0.5 % solution, 3 (Three) Times a Day As Needed for Dry Eyes., Disp: , Rfl:   •  cetirizine (zyrTEC) 10 MG tablet, Take 1 tablet by mouth Daily., Disp: , Rfl:   •  HE PO, Take  by mouth., Disp: , Rfl:   •  Cholecalciferol (VITAMIN D) 2000 UNITS capsule, Take 1 capsule by mouth Daily., Disp: , Rfl:   •  Cinnamon 500 MG tablet, Take 1 tablet by mouth daily., Disp: , Rfl:   •  ELDERBERRY PO, Take  by mouth., Disp: , Rfl:   •  fexofenadine (ALLEGRA) 60 MG tablet, Every 12 (Twelve) Hours., Disp: , Rfl:   •  fluticasone (FLONASE) 50 MCG/ACT nasal spray, 2 sprays into the nostril(s) as directed by provider Daily., Disp: 16 g, Rfl: 5  •  ibuprofen (ADVIL,MOTRIN) 600 MG tablet, Take 1 tablet by mouth 2 (Two) Times a Day As Needed., Disp: , Rfl:   •  Multiple Vitamins-Minerals (CENTRUM  SILVER ADULT 50+ PO), Take 1 tablet by mouth Daily., Disp: , Rfl:   •  Omega-3 Krill Oil 1000 MG capsule, Take 1 tablet by mouth daily., Disp: , Rfl:   •  Probiotic Product (PROBIOTIC-10 PO), Take  by mouth., Disp: , Rfl:   •  triamcinolone (KENALOG) 0.025 % cream, , Disp: , Rfl:   •  Turmeric 500 MG capsule, Take 1 capsule by mouth Daily., Disp: , Rfl:   Past Medical History:  Past Medical History:   Diagnosis Date   • Closed comminuted fracture of 2nd toe, intra-articular 07/24/2017    Podiatry - Dr. Velásquez (5/31/17): postop shoe, RTC 4 wks   • Eyelid excess skin 04/23/2018 4/18/18 ophtho/Dr. Aragon - dermatochalasis bilaterally, return for upper lid eval for bleph   • Finger amputation, traumatic 1991    h/o L.index finger amp seconary to bleacher injury, s/p failed surgery   • Foot pain 07/31/2016    Impression: 06/05/2015 - cont with cream per Dr. Davidson; patient currently not interested in surgery  Impression: 04/20/2015 - ongoing eval per Dr. Davidson; exercise modification as above; surgery would be last resort  Impression: 01/20/2015 - rec f/u with Dr. Davidson; Description: secondary to 2nd MTP capsulitis, related to hammertoe (4/15), s/p injection (2/16); podiatry - Dr. Davidson   • Hx of chest x-ray 02/28/2018    CXR (2/28/18): lenticular opacities at bases sugg of atelectasis   • Hx of chest x-ray 09/16/2019    CXR (9/16/19): chronic changes of atelectasia dn scarring at right hilar and infrahilar region as well as prior healed   • Hx of colonoscopy 06/04/2012    colonosc (6/4/12): polyp, diverticulosis, int hem; GI - Dr. George   • Hx of colonoscopy 07/24/2017    colonosc (7/24/17): polyp; GI - Dr. George   • Hx of exercise stress test 02/11/2015    nl GXT (2/11/15)   • Hx of MRI bilat hips 08/19/2022    MRI hips (8/19/22): mod arthritis changes bilat with diffuse labral tearing; paralabral cysts L>R, no effusion, sclerotic lesions right hemipelvis, indeterminate   • Hx of MRI brain 02/09/2020     brain MRI (2/9/20): Mild chronic small vessel ischemic changes, fluid in mastoid cells c/w chronic paranasal sinusitis   • Hx of MRI L-spine 06/14/2023    MRI L-spine (6/14/23): multilevel spinal canal and neural foraminal stenosis, severe at L4-5   • Hx of MRI lumbar spine 08/19/2022    MRI L-spine (8/19/22): diffuse arthritis changes, severe canal stenosis L4-5, mod-severe left/mod right NFS L5-S1   • Left-sided Bell's palsy 02/10/2020    2/9/20 ER visit - RX valtrex 1000mg BIDx7d and pred 40/20/10, each for 5 days; nl brain MRI except Mild chronic small vessel ischemic changes and fluid in mastoid cells c/w chronic paranasal sinusitis   • Right hip pain 01/31/2017 9/11/1R. Sciatic, now with proximal hamstring tendinitis, cont hamstring stretches, yoga, and walking exercise; f/u prn flare-up; ortho - Dr. Trevino; 7/7/17 no longer with sciatic sxs, agree with yoga and aquatic exercises, consider MRI L-spine if no better, ortho Nick Trevino; 6/1/17 R. Hip pain due to mild degenerative changes by xray, rx PT at [facility] and rtc 6 wks, ortho - Dr. Trevino; 3/29/17 R   • Shoulder joint pain 07/31/2016    Description: chronic tendonitis of the shoulders; ortho -   • Trochanteric bursitis of right hip 02/08/2017    s/p injection (2/8/17), resolved; ortho - Dr. Trevino     Past Surgical History:  Past Surgical History:   Procedure Laterality Date   • AMPUTATION DIGIT Left 1991     History of Amputated Index Finger DIP Joint(s) secondary to injury at bleachers; s/p failed surgery x2    • BUNIONECTOMY  10/2015    s/p Hudson bunionectomy, 2nd metatarsal osteotomy shortening, arthrodesis (10/15); podiatry - Dr. Davidson   • CARDIAC SURGERY     • COLONOSCOPY  2017    You already have this information   • INGUINAL HERNIA REPAIR     • KNEE SURGERY Left 1985   • METATARSAL OSTEOTOMY      history of buniuon correction with metatarsal osteotomy  s/p Hudson bunionectomty, 2nd metatarsal osteotomy shortening, arthrodesis (10/15);  podiatry -  Dr. Davidson     Social Hx:  Social History     Tobacco Use   • Smoking status: Never   • Smokeless tobacco: Never   Vaping Use   • Vaping Use: Never used   Substance Use Topics   • Alcohol use: Yes     Alcohol/week: 0.0 - 2.0 standard drinks     Comment: Maybe once a month   • Drug use: No     Family Hx:  Family History   Problem Relation Age of Onset   • Heart attack Mother    • Diabetes Mother    • Heart disease Mother    • Hypertension Mother    • Heart attack Father    • Aneurysm Father         AAA   • Coronary artery disease Father         CABG   • Heart failure Father    • Arrhythmia Father         pacemaker   • Skin cancer Father    • Valvular heart disease Father    • Fibromyalgia Father    • Hyperlipidemia Sister    • Hyperlipidemia Sister    • Diabetes Sister    • Hypertension Brother    • Hypertension Brother    • Hyperlipidemia Brother    • Heart attack Brother    • Arrhythmia Brother         pacemaker   • Heart attack Cousin         pat cousin  age 44     Review of Systems:        Review of Systems   Constitutional:  Negative for activity change, appetite change, chills, diaphoresis, fatigue, fever and unexpected weight change.   HENT:  Negative for congestion, dental problem, drooling, ear discharge, ear pain, facial swelling, hearing loss, mouth sores, nosebleeds, postnasal drip, rhinorrhea, sinus pressure, sneezing, sore throat, tinnitus, trouble swallowing and voice change.    Eyes:  Negative for photophobia, pain, discharge, redness, itching and visual disturbance.   Respiratory:  Negative for apnea, cough, choking, chest tightness, shortness of breath, wheezing and stridor.    Cardiovascular:  Negative for chest pain, palpitations and leg swelling.   Gastrointestinal:  Negative for abdominal distention, abdominal pain, anal bleeding, blood in stool, constipation, diarrhea, nausea, rectal pain and vomiting.   Endocrine: Negative for cold intolerance, heat intolerance, polydipsia, polyphagia and  polyuria.   Genitourinary:  Negative for decreased urine volume, difficulty urinating, dysuria, enuresis, flank pain, frequency, genital sores, hematuria and urgency.   Musculoskeletal:  Positive for back pain. Negative for arthralgias, gait problem, joint swelling, myalgias, neck pain and neck stiffness.   Skin:  Negative for color change, pallor, rash and wound.   Allergic/Immunologic: Negative for environmental allergies, food allergies and immunocompromised state.   Neurological:  Negative for dizziness, tremors, seizures, syncope, facial asymmetry, speech difficulty, weakness, light-headedness, numbness and headaches.   Hematological:  Negative for adenopathy. Does not bruise/bleed easily.   Psychiatric/Behavioral:  Negative for agitation, behavioral problems, confusion, decreased concentration, dysphoric mood, hallucinations, self-injury, sleep disturbance and suicidal ideas. The patient is not nervous/anxious and is not hyperactive.    All other systems reviewed and are negative.     I have reviewed this note template and all pertinent parts of the review of systems social, family history, surgical history and medication list    Physical Examination:  Vitals:    07/07/23 1334   Resp: 18      General Appearance:   Well developed, well nourished, well groomed, alert, and cooperative.  Neurological examination:  Neurologic Exam  Vital signs were reviewed and documented in the chart  Patient appeared in good neurologic function with normal comprehension fluent speech  Mood and affect are normal  Sense of smell deferred    Fluent speech cranial nerves intact  Muscle bulk and tone normal  5 out of 5 strength no motor drift  Gait normal intact  Negative Romberg  No clonus long tract signs or myelopathy he may have a trace Sarah's on the right    Reflexes symmetric he is symmetrically brisk  No edema noted and extremities skin appears normal    Straight leg raise sign absent  No signs of intrinsic hip  dysfunction  Back is without any lesions or abnormality  Feet are warm and well perfused        Review of Imaging/DATA:  I personally reviewed an MRI of the lumbar spine is quite impressive and compared to an old study he had progressive and of central disc herniation I would term it is critical he has adjacent level disease at multilevels  Diagnoses/Plan:    Mr. Granados is a 68 y.o. male   1.  Lumbar degeneration L4-5 worsening centralized disc herniation with sciatica    2.  Neurogenic claudication    3.  Multilevel degenerative disease    I explained the risk benefits and expected outcome of major elective surgery for their problem, complications from approach, and infection, the risk of neurologic implications after surgery as well as need for repeat surgeries and most importantly failure to achieve quality of life improvement from the surgery to the patient.  I explained the complex nature of the back pain surgery and explained that he could certainly require more surgery as retrolisthesis of 5 on 1 adjacent level disease and central stenosis at 213 and 3 4 the right now I think I would try minimally invasive approach    Plan will be for left-sided approach minimally invasive laminectomy discectomy L4-5    I explained the risk benefits and most importantly that he may require fusion should the surgery fail    We will make arrangements appropriately

## 2023-07-12 PROBLEM — M48.062 SPINAL STENOSIS OF LUMBAR REGION WITH NEUROGENIC CLAUDICATION: Chronic | Status: ACTIVE | Noted: 2022-08-22

## 2023-07-12 PROBLEM — J30.2 SEASONAL ALLERGIES: Chronic | Status: ACTIVE | Noted: 2021-05-11

## 2023-07-21 ENCOUNTER — PRE-ADMISSION TESTING (OUTPATIENT)
Dept: PREADMISSION TESTING | Facility: HOSPITAL | Age: 68
End: 2023-07-21
Payer: MEDICARE

## 2023-07-21 VITALS — BODY MASS INDEX: 27.58 KG/M2 | HEIGHT: 69 IN | WEIGHT: 186.2 LBS

## 2023-07-21 DIAGNOSIS — M53.86 SCIATICA ASSOCIATED WITH DISORDER OF LUMBAR SPINE: ICD-10-CM

## 2023-07-21 LAB
ANION GAP SERPL CALCULATED.3IONS-SCNC: 11 MMOL/L (ref 5–15)
BUN SERPL-MCNC: 18 MG/DL (ref 8–23)
BUN/CREAT SERPL: 20.5 (ref 7–25)
CALCIUM SPEC-SCNC: 9.5 MG/DL (ref 8.6–10.5)
CHLORIDE SERPL-SCNC: 104 MMOL/L (ref 98–107)
CO2 SERPL-SCNC: 25 MMOL/L (ref 22–29)
CREAT SERPL-MCNC: 0.88 MG/DL (ref 0.76–1.27)
DEPRECATED RDW RBC AUTO: 39.8 FL (ref 37–54)
EGFRCR SERPLBLD CKD-EPI 2021: 92.5 ML/MIN/1.73
ERYTHROCYTE [DISTWIDTH] IN BLOOD BY AUTOMATED COUNT: 12.1 % (ref 12.3–15.4)
GLUCOSE SERPL-MCNC: 98 MG/DL (ref 65–99)
HBA1C MFR BLD: 5.7 % (ref 4.8–5.6)
HCT VFR BLD AUTO: 47.7 % (ref 37.5–51)
HGB BLD-MCNC: 15.8 G/DL (ref 13–17.7)
MCH RBC QN AUTO: 29.9 PG (ref 26.6–33)
MCHC RBC AUTO-ENTMCNC: 33.1 G/DL (ref 31.5–35.7)
MCV RBC AUTO: 90.3 FL (ref 79–97)
PLATELET # BLD AUTO: 173 10*3/MM3 (ref 140–450)
PMV BLD AUTO: 10.4 FL (ref 6–12)
POTASSIUM SERPL-SCNC: 4.2 MMOL/L (ref 3.5–5.2)
QT INTERVAL: 426 MS
QTC INTERVAL: 446 MS
RBC # BLD AUTO: 5.28 10*6/MM3 (ref 4.14–5.8)
SODIUM SERPL-SCNC: 140 MMOL/L (ref 136–145)
WBC NRBC COR # BLD: 6.48 10*3/MM3 (ref 3.4–10.8)

## 2023-07-21 PROCEDURE — 36415 COLL VENOUS BLD VENIPUNCTURE: CPT

## 2023-07-21 PROCEDURE — 93005 ELECTROCARDIOGRAM TRACING: CPT

## 2023-07-21 PROCEDURE — 85027 COMPLETE CBC AUTOMATED: CPT

## 2023-07-21 PROCEDURE — 87081 CULTURE SCREEN ONLY: CPT

## 2023-07-21 PROCEDURE — 83036 HEMOGLOBIN GLYCOSYLATED A1C: CPT

## 2023-07-21 PROCEDURE — 93010 ELECTROCARDIOGRAM REPORT: CPT | Performed by: INTERNAL MEDICINE

## 2023-07-21 PROCEDURE — 80048 BASIC METABOLIC PNL TOTAL CA: CPT

## 2023-07-21 RX ORDER — BUDESONIDE AND FORMOTEROL FUMARATE DIHYDRATE 80; 4.5 UG/1; UG/1
2 AEROSOL RESPIRATORY (INHALATION) 2 TIMES DAILY PRN
COMMUNITY

## 2023-07-21 NOTE — PAT
An arrival time for procedure was not provided during PAT visit. If patient had any questions or concerns about their arrival time, they were instructed to contact their surgeon/physician.  Additionally, if the patient referred to an arrival time that was acquired from their my chart account, patient was encouraged to verify that time with their surgeon/physician. Arrival times are NOT provided in Pre Admission Testing Department.    Patient viewed general PAT education video as instructed in their preoperative information received from their surgeon.  Patient stated the general PAT education video was viewed in its entirety and survey completed.  Copies of PAT general education handouts (Incentive Spirometry, Meds to Beds Program, Patient Belongings, Pre-op skin preparation instructions, Blood Glucose testing, Visitor policy, Surgery FAQ, Code H) distributed to patient if not printed. Education related to the PAT pass and skin preparation for surgery (if applicable) completed in PAT as a reinforcement to PAT education video. Patient instructed to return PAT pass provided today as well as completed skin preparation sheet (if applicable) on the day of procedure.     Additionally if patient had not viewed video yet but intended to view it at home or in our waiting area, then referred them to the handout with QR code/link provided during PAT visit.  Instructed patient to complete survey after viewing the video in its entirety.  Encouraged patient/family to read PAT general education handouts thoroughly and notify PAT staff with any questions or concerns. Patient verbalized understanding of all information and priority content..    Bactroban (if prescribed) and Chlorhexidine Prescription prescribed by physician before PAT visit.  Verified with patient that medication(s) were picked up from their pharmacy.  Written instructions given to patient during PAT visit.  Patient/family also instructed to complete skin prep  checklist and return the checklist on the day of surgery to preoperative staff.  Patient/family verbalized understanding.    Patient instructed to drink 20 ounces of Gatorade and it needs to be completed 1 hour (for Main OR patients) or 2 hours (scheduled  section & [facility] patients) before given arrival time for procedure (NO RED Gatorade)    Patient verbalized understanding.    Patient denies any current skin issues.     Patient to apply Chlorhexadine wipes  to surgical area (as instructed) the night before procedure and the AM of procedure. Wipes provided.    EKG obtained.

## 2023-07-22 LAB — MRSA SPEC QL CULT: NORMAL

## 2023-07-26 ENCOUNTER — ANESTHESIA EVENT (OUTPATIENT)
Dept: PERIOP | Facility: HOSPITAL | Age: 68
End: 2023-07-26
Payer: MEDICARE

## 2023-07-27 ENCOUNTER — HOSPITAL ENCOUNTER (OUTPATIENT)
Facility: HOSPITAL | Age: 68
Discharge: HOME | End: 2023-07-27
Attending: NEUROLOGICAL SURGERY | Admitting: NEUROLOGICAL SURGERY
Payer: MEDICARE

## 2023-07-27 ENCOUNTER — APPOINTMENT (OUTPATIENT)
Dept: GENERAL RADIOLOGY | Facility: HOSPITAL | Age: 68
End: 2023-07-27
Payer: MEDICARE

## 2023-07-27 ENCOUNTER — ANESTHESIA (OUTPATIENT)
Dept: PERIOP | Facility: HOSPITAL | Age: 68
End: 2023-07-27
Payer: MEDICARE

## 2023-07-27 VITALS
WEIGHT: 186 LBS | SYSTOLIC BLOOD PRESSURE: 141 MMHG | OXYGEN SATURATION: 97 % | HEART RATE: 82 BPM | DIASTOLIC BLOOD PRESSURE: 80 MMHG | RESPIRATION RATE: 16 BRPM | BODY MASS INDEX: 27.55 KG/M2 | TEMPERATURE: 97.8 F | HEIGHT: 69 IN

## 2023-07-27 DIAGNOSIS — M53.86 SCIATICA ASSOCIATED WITH DISORDER OF LUMBAR SPINE: ICD-10-CM

## 2023-07-27 DIAGNOSIS — M48.062 SPINAL STENOSIS OF LUMBAR REGION WITH NEUROGENIC CLAUDICATION: Primary | Chronic | ICD-10-CM

## 2023-07-27 PROCEDURE — 25010000002 PROPOFOL 10 MG/ML EMULSION: Performed by: NURSE ANESTHETIST, CERTIFIED REGISTERED

## 2023-07-27 PROCEDURE — 25010000002 BUPIVACAINE (PF) 0.25 % SOLUTION 30 ML VIAL: Performed by: NEUROLOGICAL SURGERY

## 2023-07-27 PROCEDURE — 25010000002 CEFAZOLIN IN DEXTROSE 2000 MG/ 100 ML SOLUTION: Performed by: NEUROLOGICAL SURGERY

## 2023-07-27 PROCEDURE — 76000 FLUOROSCOPY <1 HR PHYS/QHP: CPT

## 2023-07-27 PROCEDURE — 25010000002 FENTANYL CITRATE (PF) 100 MCG/2ML SOLUTION: Performed by: NURSE ANESTHETIST, CERTIFIED REGISTERED

## 2023-07-27 PROCEDURE — 25010000002 DEXAMETHASONE SODIUM PHOSPHATE 10 MG/ML SOLUTION 1 ML VIAL: Performed by: NEUROLOGICAL SURGERY

## 2023-07-27 PROCEDURE — 25010000002 DEXAMETHASONE PER 1 MG: Performed by: NURSE ANESTHETIST, CERTIFIED REGISTERED

## 2023-07-27 PROCEDURE — 25010000002 SUGAMMADEX 200 MG/2ML SOLUTION: Performed by: NURSE ANESTHETIST, CERTIFIED REGISTERED

## 2023-07-27 PROCEDURE — 25010000002 METHYLPREDNISOLONE PER 40 MG: Performed by: NEUROLOGICAL SURGERY

## 2023-07-27 PROCEDURE — 63047 LAM FACETEC & FORAMOT LUMBAR: CPT | Performed by: PHYSICIAN ASSISTANT

## 2023-07-27 PROCEDURE — 25010000002 ONDANSETRON PER 1 MG: Performed by: NURSE ANESTHETIST, CERTIFIED REGISTERED

## 2023-07-27 PROCEDURE — 63047 LAM FACETEC & FORAMOT LUMBAR: CPT | Performed by: NEUROLOGICAL SURGERY

## 2023-07-27 DEVICE — FLOSEAL HEMOSTATIC MATRIX, 10ML
Type: IMPLANTABLE DEVICE | Site: SPINE LUMBAR | Status: FUNCTIONAL
Brand: FLOSEAL HEMOSTATIC MATRIX

## 2023-07-27 DEVICE — HEMOST ABS SURGIFOAM SZ100 8X12 10MM: Type: IMPLANTABLE DEVICE | Site: SPINE LUMBAR | Status: FUNCTIONAL

## 2023-07-27 RX ORDER — SODIUM CHLORIDE 9 MG/ML
40 INJECTION, SOLUTION INTRAVENOUS AS NEEDED
Status: DISCONTINUED | OUTPATIENT
Start: 2023-07-27 | End: 2023-07-27 | Stop reason: HOSPADM

## 2023-07-27 RX ORDER — MAGNESIUM HYDROXIDE 1200 MG/15ML
LIQUID ORAL AS NEEDED
Status: DISCONTINUED | OUTPATIENT
Start: 2023-07-27 | End: 2023-07-27 | Stop reason: HOSPADM

## 2023-07-27 RX ORDER — NALOXONE HCL 0.4 MG/ML
0.4 VIAL (ML) INJECTION AS NEEDED
Status: DISCONTINUED | OUTPATIENT
Start: 2023-07-27 | End: 2023-07-27 | Stop reason: HOSPADM

## 2023-07-27 RX ORDER — IBUPROFEN 800 MG/1
800 TABLET ORAL ONCE
Status: COMPLETED | OUTPATIENT
Start: 2023-07-27 | End: 2023-07-27

## 2023-07-27 RX ORDER — METHYLPREDNISOLONE ACETATE 40 MG/ML
INJECTION, SUSPENSION INTRA-ARTICULAR; INTRALESIONAL; INTRAMUSCULAR; SOFT TISSUE AS NEEDED
Status: DISCONTINUED | OUTPATIENT
Start: 2023-07-27 | End: 2023-07-27 | Stop reason: HOSPADM

## 2023-07-27 RX ORDER — HYDROCODONE BITARTRATE AND ACETAMINOPHEN 7.5; 325 MG/1; MG/1
1 TABLET ORAL ONCE
Status: COMPLETED | OUTPATIENT
Start: 2023-07-27 | End: 2023-07-27

## 2023-07-27 RX ORDER — PROPOFOL 10 MG/ML
VIAL (ML) INTRAVENOUS AS NEEDED
Status: DISCONTINUED | OUTPATIENT
Start: 2023-07-27 | End: 2023-07-27 | Stop reason: SURG

## 2023-07-27 RX ORDER — HYDROCODONE BITARTRATE AND ACETAMINOPHEN 5; 325 MG/1; MG/1
1 TABLET ORAL EVERY 4 HOURS PRN
Qty: 30 TABLET | Refills: 0 | Status: SHIPPED | OUTPATIENT
Start: 2023-07-27

## 2023-07-27 RX ORDER — CEFAZOLIN SODIUM 2 G/100ML
2 INJECTION, SOLUTION INTRAVENOUS ONCE
Status: COMPLETED | OUTPATIENT
Start: 2023-07-27 | End: 2023-07-27

## 2023-07-27 RX ORDER — SODIUM CHLORIDE 0.9 % (FLUSH) 0.9 %
10 SYRINGE (ML) INJECTION AS NEEDED
Status: DISCONTINUED | OUTPATIENT
Start: 2023-07-27 | End: 2023-07-27 | Stop reason: HOSPADM

## 2023-07-27 RX ORDER — HYDROMORPHONE HYDROCHLORIDE 1 MG/ML
0.5 INJECTION, SOLUTION INTRAMUSCULAR; INTRAVENOUS; SUBCUTANEOUS
Status: DISCONTINUED | OUTPATIENT
Start: 2023-07-27 | End: 2023-07-27 | Stop reason: HOSPADM

## 2023-07-27 RX ORDER — LIDOCAINE HYDROCHLORIDE 10 MG/ML
0.5 INJECTION, SOLUTION EPIDURAL; INFILTRATION; INTRACAUDAL; PERINEURAL ONCE AS NEEDED
Status: COMPLETED | OUTPATIENT
Start: 2023-07-27 | End: 2023-07-27

## 2023-07-27 RX ORDER — MIDAZOLAM HYDROCHLORIDE 1 MG/ML
0.5 INJECTION INTRAMUSCULAR; INTRAVENOUS
Status: DISCONTINUED | OUTPATIENT
Start: 2023-07-27 | End: 2023-07-27 | Stop reason: HOSPADM

## 2023-07-27 RX ORDER — LABETALOL HYDROCHLORIDE 5 MG/ML
5 INJECTION, SOLUTION INTRAVENOUS
Status: DISCONTINUED | OUTPATIENT
Start: 2023-07-27 | End: 2023-07-27 | Stop reason: HOSPADM

## 2023-07-27 RX ORDER — FAMOTIDINE 20 MG/1
20 TABLET, FILM COATED ORAL
Status: COMPLETED | OUTPATIENT
Start: 2023-07-27 | End: 2023-07-27

## 2023-07-27 RX ORDER — SODIUM CHLORIDE 0.9 % (FLUSH) 0.9 %
3 SYRINGE (ML) INJECTION EVERY 12 HOURS SCHEDULED
Status: DISCONTINUED | OUTPATIENT
Start: 2023-07-27 | End: 2023-07-27 | Stop reason: HOSPADM

## 2023-07-27 RX ORDER — ONDANSETRON 2 MG/ML
4 INJECTION INTRAMUSCULAR; INTRAVENOUS ONCE AS NEEDED
Status: DISCONTINUED | OUTPATIENT
Start: 2023-07-27 | End: 2023-07-27 | Stop reason: HOSPADM

## 2023-07-27 RX ORDER — DROPERIDOL 2.5 MG/ML
0.62 INJECTION, SOLUTION INTRAMUSCULAR; INTRAVENOUS
Status: DISCONTINUED | OUTPATIENT
Start: 2023-07-27 | End: 2023-07-27 | Stop reason: HOSPADM

## 2023-07-27 RX ORDER — PROMETHAZINE HYDROCHLORIDE 25 MG/1
25 TABLET ORAL ONCE AS NEEDED
Status: DISCONTINUED | OUTPATIENT
Start: 2023-07-27 | End: 2023-07-27 | Stop reason: HOSPADM

## 2023-07-27 RX ORDER — HYDRALAZINE HYDROCHLORIDE 20 MG/ML
5 INJECTION INTRAMUSCULAR; INTRAVENOUS
Status: DISCONTINUED | OUTPATIENT
Start: 2023-07-27 | End: 2023-07-27 | Stop reason: HOSPADM

## 2023-07-27 RX ORDER — FENTANYL CITRATE 50 UG/ML
INJECTION, SOLUTION INTRAMUSCULAR; INTRAVENOUS AS NEEDED
Status: DISCONTINUED | OUTPATIENT
Start: 2023-07-27 | End: 2023-07-27 | Stop reason: SURG

## 2023-07-27 RX ORDER — METHOCARBAMOL 750 MG/1
750 TABLET, FILM COATED ORAL 4 TIMES DAILY PRN
Qty: 90 TABLET | Refills: 0 | Status: SHIPPED | OUTPATIENT
Start: 2023-07-27

## 2023-07-27 RX ORDER — SODIUM CHLORIDE, SODIUM LACTATE, POTASSIUM CHLORIDE, CALCIUM CHLORIDE 600; 310; 30; 20 MG/100ML; MG/100ML; MG/100ML; MG/100ML
9 INJECTION, SOLUTION INTRAVENOUS CONTINUOUS
Status: DISCONTINUED | OUTPATIENT
Start: 2023-07-27 | End: 2023-07-27 | Stop reason: HOSPADM

## 2023-07-27 RX ORDER — ROCURONIUM BROMIDE 10 MG/ML
INJECTION, SOLUTION INTRAVENOUS AS NEEDED
Status: DISCONTINUED | OUTPATIENT
Start: 2023-07-27 | End: 2023-07-27 | Stop reason: SURG

## 2023-07-27 RX ORDER — ONDANSETRON 2 MG/ML
INJECTION INTRAMUSCULAR; INTRAVENOUS AS NEEDED
Status: DISCONTINUED | OUTPATIENT
Start: 2023-07-27 | End: 2023-07-27 | Stop reason: SURG

## 2023-07-27 RX ORDER — ACETAMINOPHEN 325 MG/1
650 TABLET ORAL ONCE
Status: COMPLETED | OUTPATIENT
Start: 2023-07-27 | End: 2023-07-27

## 2023-07-27 RX ORDER — HYDROCODONE BITARTRATE AND ACETAMINOPHEN 5; 325 MG/1; MG/1
1 TABLET ORAL ONCE AS NEEDED
Status: DISCONTINUED | OUTPATIENT
Start: 2023-07-27 | End: 2023-07-27 | Stop reason: HOSPADM

## 2023-07-27 RX ORDER — FENTANYL CITRATE 50 UG/ML
50 INJECTION, SOLUTION INTRAMUSCULAR; INTRAVENOUS
Status: DISCONTINUED | OUTPATIENT
Start: 2023-07-27 | End: 2023-07-27 | Stop reason: HOSPADM

## 2023-07-27 RX ORDER — HYDROCODONE BITARTRATE AND ACETAMINOPHEN 5; 325 MG/1; MG/1
1 TABLET ORAL ONCE AS NEEDED
Status: DISCONTINUED | OUTPATIENT
Start: 2023-07-27 | End: 2023-07-27 | Stop reason: SDUPTHER

## 2023-07-27 RX ORDER — EPHEDRINE SULFATE 50 MG/ML
INJECTION INTRAVENOUS AS NEEDED
Status: DISCONTINUED | OUTPATIENT
Start: 2023-07-27 | End: 2023-07-27 | Stop reason: SURG

## 2023-07-27 RX ORDER — LIDOCAINE HYDROCHLORIDE AND EPINEPHRINE 5; 5 MG/ML; UG/ML
INJECTION, SOLUTION INFILTRATION; PERINEURAL AS NEEDED
Status: DISCONTINUED | OUTPATIENT
Start: 2023-07-27 | End: 2023-07-27 | Stop reason: HOSPADM

## 2023-07-27 RX ORDER — SODIUM CHLORIDE 0.9 % (FLUSH) 0.9 %
10 SYRINGE (ML) INJECTION EVERY 12 HOURS SCHEDULED
Status: DISCONTINUED | OUTPATIENT
Start: 2023-07-27 | End: 2023-07-27 | Stop reason: HOSPADM

## 2023-07-27 RX ORDER — PROMETHAZINE HYDROCHLORIDE 25 MG/1
25 SUPPOSITORY RECTAL ONCE AS NEEDED
Status: DISCONTINUED | OUTPATIENT
Start: 2023-07-27 | End: 2023-07-27 | Stop reason: HOSPADM

## 2023-07-27 RX ORDER — IPRATROPIUM BROMIDE AND ALBUTEROL SULFATE 2.5; .5 MG/3ML; MG/3ML
3 SOLUTION RESPIRATORY (INHALATION) ONCE AS NEEDED
Status: DISCONTINUED | OUTPATIENT
Start: 2023-07-27 | End: 2023-07-27 | Stop reason: HOSPADM

## 2023-07-27 RX ORDER — LIDOCAINE HYDROCHLORIDE 10 MG/ML
INJECTION, SOLUTION EPIDURAL; INFILTRATION; INTRACAUDAL; PERINEURAL AS NEEDED
Status: DISCONTINUED | OUTPATIENT
Start: 2023-07-27 | End: 2023-07-27 | Stop reason: SURG

## 2023-07-27 RX ORDER — DROPERIDOL 2.5 MG/ML
0.62 INJECTION, SOLUTION INTRAMUSCULAR; INTRAVENOUS ONCE AS NEEDED
Status: DISCONTINUED | OUTPATIENT
Start: 2023-07-27 | End: 2023-07-27 | Stop reason: HOSPADM

## 2023-07-27 RX ORDER — SODIUM CHLORIDE 0.9 % (FLUSH) 0.9 %
3-10 SYRINGE (ML) INJECTION AS NEEDED
Status: DISCONTINUED | OUTPATIENT
Start: 2023-07-27 | End: 2023-07-27 | Stop reason: HOSPADM

## 2023-07-27 RX ORDER — DEXAMETHASONE SODIUM PHOSPHATE 4 MG/ML
INJECTION, SOLUTION INTRA-ARTICULAR; INTRALESIONAL; INTRAMUSCULAR; INTRAVENOUS; SOFT TISSUE AS NEEDED
Status: DISCONTINUED | OUTPATIENT
Start: 2023-07-27 | End: 2023-07-27 | Stop reason: SURG

## 2023-07-27 RX ADMIN — ROCURONIUM BROMIDE 50 MG: 10 SOLUTION INTRAVENOUS at 09:01

## 2023-07-27 RX ADMIN — SODIUM CHLORIDE, POTASSIUM CHLORIDE, SODIUM LACTATE AND CALCIUM CHLORIDE 9 ML/HR: 600; 310; 30; 20 INJECTION, SOLUTION INTRAVENOUS at 08:49

## 2023-07-27 RX ADMIN — SUGAMMADEX 200 MG: 100 INJECTION, SOLUTION INTRAVENOUS at 10:31

## 2023-07-27 RX ADMIN — DEXAMETHASONE SODIUM PHOSPHATE 8 MG: 4 INJECTION, SOLUTION INTRAMUSCULAR; INTRAVENOUS at 09:06

## 2023-07-27 RX ADMIN — ACETAMINOPHEN 650 MG: 325 TABLET, FILM COATED ORAL at 08:54

## 2023-07-27 RX ADMIN — SODIUM CHLORIDE, POTASSIUM CHLORIDE, SODIUM LACTATE AND CALCIUM CHLORIDE: 600; 310; 30; 20 INJECTION, SOLUTION INTRAVENOUS at 10:30

## 2023-07-27 RX ADMIN — PROPOFOL 25 MCG/KG/MIN: 10 INJECTION, EMULSION INTRAVENOUS at 09:06

## 2023-07-27 RX ADMIN — CEFAZOLIN SODIUM 2 G: 2 INJECTION, SOLUTION INTRAVENOUS at 09:08

## 2023-07-27 RX ADMIN — LIDOCAINE HYDROCHLORIDE 100 MG: 10 INJECTION, SOLUTION EPIDURAL; INFILTRATION; INTRACAUDAL; PERINEURAL at 09:01

## 2023-07-27 RX ADMIN — EPHEDRINE SULFATE 10 MG: 50 INJECTION INTRAVENOUS at 09:28

## 2023-07-27 RX ADMIN — HYDROCODONE BITARTRATE AND ACETAMINOPHEN 1 TABLET: 7.5; 325 TABLET ORAL at 08:54

## 2023-07-27 RX ADMIN — IBUPROFEN 800 MG: 800 TABLET, FILM COATED ORAL at 08:53

## 2023-07-27 RX ADMIN — ROCURONIUM BROMIDE 15 MG: 10 SOLUTION INTRAVENOUS at 09:46

## 2023-07-27 RX ADMIN — FAMOTIDINE 20 MG: 20 TABLET ORAL at 08:53

## 2023-07-27 RX ADMIN — LIDOCAINE HYDROCHLORIDE 0.5 ML: 10 INJECTION, SOLUTION EPIDURAL; INFILTRATION; INTRACAUDAL; PERINEURAL at 08:54

## 2023-07-27 RX ADMIN — PROPOFOL 150 MG: 10 INJECTION, EMULSION INTRAVENOUS at 09:01

## 2023-07-27 RX ADMIN — ONDANSETRON 4 MG: 2 INJECTION INTRAMUSCULAR; INTRAVENOUS at 10:31

## 2023-07-27 RX ADMIN — FENTANYL CITRATE 100 MCG: 50 INJECTION, SOLUTION INTRAMUSCULAR; INTRAVENOUS at 09:01

## 2023-07-27 NOTE — ANESTHESIA PREPROCEDURE EVALUATION
Anesthesia Evaluation     Patient summary reviewed and Nursing notes reviewed                Airway   Mallampati: II  TM distance: >3 FB  Neck ROM: full  No difficulty expected  Dental - normal exam   (+) implants    Pulmonary - normal exam   (+) asthma,shortness of breath  Cardiovascular - normal exam    (+) hypertensiondysrhythmias (H/O RIGHT BBB)      Neuro/Psych  (+) numbness  GI/Hepatic/Renal/Endo - negative ROS     Musculoskeletal     Abdominal  - normal exam    Bowel sounds: normal.   Substance History - negative use     OB/GYN negative ob/gyn ROS         Other   arthritis,                     Anesthesia Plan    ASA 3     general     intravenous induction     Anesthetic plan, risks, benefits, and alternatives have been provided, discussed and informed consent has been obtained with: patient.    Plan discussed with CRNA.      CODE STATUS:

## 2023-07-27 NOTE — INTERVAL H&P NOTE
"[facility] Pre-op    Full history and physical note from office is attached.    BP (!) 184/99   Pulse 88   Temp 97 °F (36.1 °C) (Temporal)   Ht 174 cm (68.5\")   Wt 84.4 kg (186 lb)   BMI 27.87 kg/m²     LAB Results:  Lab Results   Component Value Date    WBC 6.48 07/21/2023    HGB 15.8 07/21/2023    HCT 47.7 07/21/2023    MCV 90.3 07/21/2023     07/21/2023    NEUTROABS 4.59 11/10/2022    GLUCOSE 98 07/21/2023    BUN 18 07/21/2023    CREATININE 0.88 07/21/2023    EGFRIFNONA 84 11/03/2021    EGFRIFAFRI 97 11/03/2021     07/21/2023    K 4.2 07/21/2023     07/21/2023    CO2 25.0 07/21/2023    MG 2.1 02/09/2020    CALCIUM 9.5 07/21/2023    ALBUMIN 4.60 11/10/2022    AST 19 11/10/2022    ALT 16 11/10/2022    BILITOT 0.4 11/10/2022    INR 1.00 09/08/2015 6/14/23 lumbar MRI:  FINDINGS:  3 mm retrolisthesis at L5-S1. Visualized marrow signal is unremarkable. Vertebral body heights are normal. Lumbar discs are desiccated, and there is multilevel disc space narrowing. The conus terminates at approximately the L2 level. No abnormal signal   is identified within the conus. The visualized paraspinal soft tissues are without significant abnormality. Limited visualization of the intra-abdominal structures is unremarkable.     T12-L1: No significant spinal canal or neural foraminal stenosis.     L1-L2: No significant spinal canal or neural foraminal stenosis.     L2-L3: There is a right paramedian annular fissure with broad-based disc bulge. There is bilateral facet arthropathy. There is mild spinal canal stenosis, and there is mild to moderate bilateral neuroforaminal stenosis.     L3-L4: Broad-based disc bulge with right greater than left facet arthropathy. There is moderate right and mild left neural foraminal narrowing. There is effacement of the anterior thecal sac. Spinal canal is mildly narrowed     L4-L5: There is a central disc extrusion superimposed upon a broad-based disc bulge, " and there is bilateral facet arthropathy. Extruded disc material measures approximately 16 mm (CC) x 8 mm (AP) and extends above and below the disc space. There is   severe spinal canal stenosis with moderate bilateral neural foraminal stenosis at this level.     L5-S1: 3 mm retrolisthesis with disc bulging and bilateral facet arthropathy. There is moderate to severe bilateral neural foraminal stenosis.     IMPRESSION:  Lumbar spondylosis with 3 mm retrolisthesis at L5-S1. There is multilevel spinal canal and neural foraminal stenosis. Spinal canal is narrowest at the L4-L5 level. Please see above for additional details.    Cancer Staging (if applicable)  Cancer Patient: __ yes __no __unknown__N/A; If yes, clinical stage T:__ N:__M:__, stage group or __N/A      Impression: Sciatica associated with disorder of lumbar spine      Plan: L4-5 laminoforaminotomy MIS laminectomy left-sided approach      Jena Sanchez, ANAID   7/27/2023   08:49 EDT

## 2023-07-27 NOTE — ANESTHESIA PROCEDURE NOTES
Airway  Urgency: elective    Date/Time: 7/27/2023 9:03 AM  Airway not difficult    General Information and Staff    Patient location during procedure: OR  CRNA/CAA: Radha Melchor CRNA    Indications and Patient Condition  Indications for airway management: airway protection    Preoxygenated: yes  MILS maintained throughout  Mask difficulty assessment: 1 - vent by mask    Final Airway Details  Final airway type: endotracheal airway      Successful airway: ETT     Successful intubation technique: video laryngoscopy  Facilitating devices/methods: intubating stylet  Endotracheal tube insertion site: oral  Blade: Sawyer  Blade size: 3.5  ETT size (mm): 7.5  Cormack-Lehane Classification: grade I - full view of glottis  Placement verified by: chest auscultation and capnometry   Measured from: lips  ETT/EBT  to lips (cm): 22  Number of attempts at approach: 1  Assessment: lips, teeth, and gum same as pre-op and atraumatic intubation    Additional Comments  Elective Sawyer used, negative epigastric sounds, symmetrical chest rise and fall, dentition and lips unchanged, MILS maintained throughout

## 2023-07-27 NOTE — ANESTHESIA POSTPROCEDURE EVALUATION
Patient: Yoseph Granados Jr.    Procedure Summary       Date: 07/27/23 Room / Location:  [facility] /  [facility]    Anesthesia Start: 0858 Anesthesia Stop: 1046    Procedure: L4-5 laminoforaminotomy MIS laminectomy left-sided approach (Left: Spine Lumbar) Diagnosis:       Sciatica associated with disorder of lumbar spine      (Sciatica associated with disorder of lumbar spine [M53.86])    Surgeons: Hans Valentin MD Provider: Williams Varner MD    Anesthesia Type: general ASA Status: 3            Anesthesia Type: general    Vitals  Vitals Value Taken Time   /47 07/27/23 1044   Temp     Pulse 67 07/27/23 1045   Resp     SpO2 99 % 07/27/23 1045   Vitals shown include unvalidated device data.    Temp: 97.4   RR: 14    Post Anesthesia Care and Evaluation    Patient location during evaluation: PACU  Patient participation: complete - patient participated  Level of consciousness: sleepy but conscious  Pain score: 0  Pain management: adequate    Airway patency: patent  Anesthetic complications: No anesthetic complications  PONV Status: none  Cardiovascular status: hemodynamically stable and acceptable  Respiratory status: nonlabored ventilation, acceptable and nasal cannula  Hydration status: acceptable

## 2023-07-27 NOTE — INTERVAL H&P NOTE
No change in plan   Explained compelx nature again of his spine   Mostly left leg   Lazy eye noted as a chronic finding

## 2023-07-27 NOTE — OP NOTE
Operation note      Preoperative diagnosis  Very severe foraminal bilateral stenosis with centralized disc herniation      Postoperative diagnosis same    Procedures performed   Left-sided approach MIS laminectomy L4-5 with L4-5 bilateral laminoforaminotomy  Surgeon: Hans Valentin MD     Assistants: Cleve Paz my physician's assistant was present and personally scrubbed the entirety of the procedure, they assisted suctioning, retraction, approach, and closure of the case    Anesthesia: Normal endotracheal anesthesia    ASA Class: 2    Heart Central disc calcification severe facet arthropathy with severe lateral compression     Complications none    20 cc blood loss microscope used    Procedure in detail after formal written consent was obtained the patient was taken to the operating room endotracheally intubated given preoperative antimicrobial prophylaxis they were prepped and draped in the usual sterile manner all bony prominences and genitalia were padded to prevent neurologic injury.    At this point in time the patient was given local anesthesia at the operative level fluoroscopic guidance confirmed as 45 the correct level and tubular dilation system was placed on the lamina facet complex to ensure adequate exposure.    At this point time the microscope was used to visualize the exposure a hemilaminotomy and partial medial facetectomy was performed the yellow ligament was then identified.  Copious central ligament was identified and removed and then drilling the subarticular area across the midline allowed access to remove approximately 50% of the undersurface of the lamina of L4 this allowed access to the contralateral lateral recess where a foraminotomies was performed    After adequate decompression was best achieved then I turned my attention to my side where I did a unilateral foraminotomy    A ball probe could easily pass into bilateral foramina again severe changes were noticed from arthritis      At the resolution the case meticulous hemostasis was maintained and in the incision was closed in layers I was present personally performed the entirety of the procedure until the skin closure.

## 2023-07-27 NOTE — PERIOPERATIVE NURSING NOTE
Ambulated to BRP gait steady tolerated well voided in good amounts yellow urine. Meets discharge criteria

## 2023-07-27 NOTE — DISCHARGE INSTRUCTIONS
Remove dressing in 48 hour then shower leave steri strips       Dr. Valentin's Office will call you with follow-up for 3 weeks        Dr. Valentin's Office [phone]

## 2023-08-02 ENCOUNTER — TELEPHONE (OUTPATIENT)
Dept: NEUROSURGERY | Facility: CLINIC | Age: 68
End: 2023-08-02
Payer: MEDICARE

## 2023-08-17 ENCOUNTER — OFFICE VISIT (OUTPATIENT)
Dept: NEUROSURGERY | Facility: CLINIC | Age: 68
End: 2023-08-17
Payer: MEDICARE

## 2023-08-17 VITALS
TEMPERATURE: 97.5 F | BODY MASS INDEX: 27.55 KG/M2 | WEIGHT: 186 LBS | SYSTOLIC BLOOD PRESSURE: 138 MMHG | DIASTOLIC BLOOD PRESSURE: 80 MMHG | HEIGHT: 69 IN

## 2023-08-17 DIAGNOSIS — M51.16 LUMBAR DISC HERNIATION WITH RADICULOPATHY: Primary | ICD-10-CM

## 2023-08-17 PROCEDURE — 3079F DIAST BP 80-89 MM HG: CPT | Performed by: PHYSICIAN ASSISTANT

## 2023-08-17 PROCEDURE — 99024 POSTOP FOLLOW-UP VISIT: CPT | Performed by: PHYSICIAN ASSISTANT

## 2023-08-17 PROCEDURE — 3075F SYST BP GE 130 - 139MM HG: CPT | Performed by: PHYSICIAN ASSISTANT

## 2023-08-17 NOTE — PROGRESS NOTES
Subjective   Patient ID: Yoseph Granados Jr. is a 68 y.o. male is here today for follow-up for wound check.    HPI:      Patient is nice 68-year-old gentleman who underwent a left-sided L4-5 microdiscectomy and laminectomy.  Procedure went without event and patient did well postoperatively.  Patient has had no pain in his low back or lower extremities but does have a little bit of numbness in his feet with overactivity.    Sugar questions were reviewed and discussed.  Patient understands his activity limitation the postoperative course    The following portions of the patient's history were reviewed and updated as appropriate: allergies, current medications, past family history, past medical history, past social history, past surgical history and problem list.    Review of Systems   Constitutional:  Negative for activity change, appetite change, chills, fatigue and fever.   HENT:  Negative for congestion, dental problem, ear pain, hearing loss, sinus pressure and tinnitus.    Eyes:  Negative for pain and redness.   Respiratory:  Negative for apnea, cough, shortness of breath and wheezing.    Cardiovascular:  Negative for chest pain, palpitations and leg swelling.   Gastrointestinal:  Negative for abdominal distention, abdominal pain, blood in stool, constipation, diarrhea, nausea and vomiting.   Endocrine: Negative for cold intolerance, heat intolerance and polyuria.   Genitourinary:  Negative for enuresis, frequency and urgency.   Skin:  Positive for rash. Negative for color change.   Neurological:  Negative for dizziness, tremors, seizures, syncope, speech difficulty, weakness, light-headedness, numbness and headaches.   Psychiatric/Behavioral:  Negative for behavioral problems and confusion. The patient is not nervous/anxious.        Objective    reports that he has never smoked. He has never used smokeless tobacco. He reports current alcohol use. He reports that he does not use drugs.   SMOKING STATUS:  "Non-smoker    Physical Exam:   Vitals:/80 (BP Location: Right arm, Patient Position: Sitting, Cuff Size: Adult)   Temp 97.5 øF (36.4 øC) (Infrared)   Ht 175.3 cm (69\")   Wt 84.4 kg (186 lb)   BMI 27.47 kg/mý    BMI: Body mass index is 27.47 kg/mý.       Incision:   The incision is well-healed and well approximated.  No signs of infection, bleeding, or erythema.    Musculoskeletal:                                            Dorsiflexion is 5/5 Bilaterally                    Plantarflexion is 5/5 bilaterally                    Hip Flexion 5/5 bilaterally.                        Neurologic:                                         The patient is alert and oriented by 3.                       Sensation is equal bilaterally with no deficit.                        Reflexes:  2+ throughout     Assessment & Plan   Independent Review of Radiographic Studies:    No new films reviewed at this visit  Medical Decision Making:    At this point the patient is doing very well.  The patient is pleased with postsurgical outcome.  With the resolution of pain, I believe that it is adequate to see the patient back on an as-needed basis.  The patient has been educated on reasons that would necessitate a referral back to us in a more urgent manner.  The patient understands of his instructions and limitations for the best post-operative success.    It has been a pleasure providing neurosurgical care.    Cleve Paz PA-C    BMI is >= 25 and <30. (Overweight) The following options were offered after discussion;: weight loss educational material (shared in after visit summary)    Diagnoses and all orders for this visit:    1. Lumbar disc herniation with radiculopathy (Primary)      No follow-ups on file.       Answers submitted by the patient for this visit:  Primary Reason for Visit (Submitted on 8/10/2023)  What is the primary reason for your visit?: Other  Other (Submitted on 8/10/2023)  Please describe your symptoms.: Surgery " follow-up appointment  Have you had these symptoms before?: Yes  How long have you been having these symptoms?: Greater than 2 weeks  Please list any medications you are currently taking for this condition.: None  Please describe any probable cause for these symptoms. : Surgery follow-up

## 2023-10-01 ENCOUNTER — TELEPHONE (OUTPATIENT)
Dept: INTERNAL MEDICINE | Facility: CLINIC | Age: 68
End: 2023-10-01
Payer: MEDICARE

## 2023-10-01 NOTE — TELEPHONE ENCOUNTER
Called by patient today morning regarding passing out spell.  He reports that he is in [place] for a wedding and was getting ready to drive back this morning.  When he got in my before he was on the road he apparently passed out and was snoring for a few minutes.  Denies being a diabetic or having hypoglycemic episodes.  Denies any nausea vomiting or diarrhea.  He sounded coherent and reports that his wife was in the passenger seat.  She did not observe any seizure-like spells.  Advised him to go to the nearest ER for further evaluation and that I would not recommend he drive back to town without getting checked out.

## 2023-10-04 NOTE — PROGRESS NOTES
Chief Complaint   Patient presents with    ER follow up    Loss of Consciousness    Syncope    Dizziness       History of Present Illness  68 y.o.  male, accompanied by his wife, presents for ER follow-up; was out of town and had episode of passing out while sitting in parked car. Had no premonitory sxs but felt dizzy afterwards. Patient and wife relate that they were talking and as he was about to put the car into drive, he suddenly stopped talking. When wife looked over, patient's head was slumped over and he was snoring. She tru him awake so total LOC was only 10-15 seconds.     Patient has no recollection of events but was incontinence of urine when he came to. Reports feeling a little woozy and weak but was able to get out of the car and walk. Was able to program directions on phone so wife could drive them to his son's. Had also mild nausea but no vomiting- or abd pain. Denies lightheadedness, CP, palpitations, SOB. There was no seizure activity per the wife.    ER evaluation was unremarkable, incl neg CT head per patient.    Patient reports episode of LOC 4/21. Had walked into the computer room in his house. Had no premonitory sxs and somehow passed out. He was home alone. Had some bruising on his arms and side; does not think he hit his head.  He reports at that time fainting episode was attributed to dehydration.    For this episode, patient had already eaten breakfast with normal fluid intake prior to the episode.  He had also made 6-8 trips from the hotel room to the car without lightheadedness, weakness, chest pain, palpitations, or any exertional limitations.    Reports brother had multiple syncopal episodes within a short period of time, was eventually diagnosed with arrhythmia with subsequent pacemaker placement.    No change in medications.  No new supplements OTC.    Is worried about potential sinus infection.  Has discomfort behind the ear that had been ongoing for 1-2 weeks.  He thinks  "this is part of the reason for his dizziness.  Denies actual ear pain or ear drainage.  Denies significant nasal congestion.  Denies fevers or chills.  Denies cough or sore throat.  Has been consistent with Zyrtec and Flonase.    Review of Systems  ROS (+) for episode of LOC associated with urinary incontinence as noted.  Did not have promontory symptoms, did not have associated chest pain, palpitations, shortness of breath, visual changes, headaches.  Had subsequent mild residual nausea and dizziness, but was able to walk normally and denies associated confusion or mental deficits.     ROS (+) for 1-2 wks R postauricular pain; denies nasal congestion, fevers/chills, sore throat, cough.  All other ROS reviewed and negative.    FH: - oldest brother: syncope, pacemaker    Current Outpatient Medications:     aspirin 81 MG QD    budesonide-formoterol (SYMBICORT) 80-4.5 MCG/ACT 2 puffs BID    carboxymethylcellulose (REFRESH PLUS) 0.5 % TID prn    cetirizine (zyrTEC) 10 MG QD    CHERRY QD    Cholecalciferol (VITAMIN D) 2000 UNITS QD    Cinnamon 500 MG QD    ELDERBERRY PO, Take  by mouth., Disp: , Rfl:     fluticasone (FLONASE) 50 MCG/ACT nasal spray AD    ibuprofen (ADVIL,MOTRIN) 600 MG prn:     CENTRUM SILVER ADULT 50+ QD    Omega-3 Krill Oil 1000 MG QD    Probiotic Product QD    triamcinolone (KENALOG) 0.025 % cream AD    Turmeric 500 MG QD    VITALS:  /70   Pulse 71   Ht 175.3 cm (69\")   Wt 85.7 kg (189 lb)   SpO2 98%   BMI 27.91 kg/mý     Physical Exam  Vitals and nursing note reviewed.   Constitutional:       General: He is not in acute distress.     Appearance: Normal appearance. He is not ill-appearing.   HENT:      Right Ear: Tympanic membrane, ear canal and external ear normal.      Left Ear: Tympanic membrane, ear canal and external ear normal.      Ears:      Comments: No postauricular adenopathy; no rash at the skin behind the ear or at the neck, no reproducible tenderness with palpation     " Nose: No congestion or rhinorrhea.   Eyes:      Extraocular Movements: Extraocular movements intact.      Conjunctiva/sclera: Conjunctivae normal.   Neck:      Vascular: No carotid bruit (no carotid bruits bilaterally).   Cardiovascular:      Rate and Rhythm: Normal rate and regular rhythm.      Heart sounds: Normal heart sounds.   Pulmonary:      Effort: Pulmonary effort is normal. No respiratory distress.      Breath sounds: Normal breath sounds. No wheezing, rhonchi or rales.   Musculoskeletal:      Cervical back: No rigidity.   Neurological:      Mental Status: He is alert and oriented to person, place, and time. Mental status is at baseline.      Sensory: Sensation is intact.      Motor: Motor function is intact.      Coordination: Romberg sign negative. Coordination normal. Finger-Nose-Finger Test and Heel to Shin Test normal.      Deep Tendon Reflexes:      Reflex Scores:       Bicep reflexes are 2+ on the right side and 2+ on the left side.       Patellar reflexes are 2+ on the right side and 2+ on the left side.     Comments: Forehead furrowing, nasolabial folds, tongue extension, palate elevation symmetric; shoulder shrug intact; FTN and HTS intact bilaterally, symmetric; fast hand movements intact and symmetric; tandem gait, heel-walking and toe-walking intact; strength 5/5 BUE/BLE, sensation to light touch intact and symmetric     Psychiatric:         Mood and Affect: Mood normal.         Behavior: Behavior normal.         LABS  10/1/23 ER labs - stable CBC, BMP, neg troponin, D-dimer 0.44    10/1/23 ER CT head - neg for intracranial hemorrhage or mass; paranasal sinuses well pneumatized    Results for orders placed or performed in visit on 07/21/23   MRSA Screen Culture (Outpatient) - Swab, Nares    Specimen: Nares; Swab   Result Value Ref Range    MRSA Screen Cx       No Methicillin Resistant Staphylococcus aureus Isolated at 24 hours   Hemoglobin A1c    Specimen: Blood   Result Value Ref Range     Hemoglobin A1C 5.70 (H) 4.80 - 5.60 %   CBC (No Diff)    Specimen: Blood   Result Value Ref Range    WBC 6.48 3.40 - 10.80 10*3/mm3    RBC 5.28 4.14 - 5.80 10*6/mm3    Hemoglobin 15.8 13.0 - 17.7 g/dL    Hematocrit 47.7 37.5 - 51.0 %    MCV 90.3 79.0 - 97.0 fL    MCH 29.9 26.6 - 33.0 pg    MCHC 33.1 31.5 - 35.7 g/dL    RDW 12.1 (L) 12.3 - 15.4 %    RDW-SD 39.8 37.0 - 54.0 fl    MPV 10.4 6.0 - 12.0 fL    Platelets 173 140 - 450 10*3/mm3   Basic metabolic panel    Specimen: Blood   Result Value Ref Range    Glucose 98 65 - 99 mg/dL    BUN 18 8 - 23 mg/dL    Creatinine 0.88 0.76 - 1.27 mg/dL    Sodium 140 136 - 145 mmol/L    Potassium 4.2 3.5 - 5.2 mmol/L    Chloride 104 98 - 107 mmol/L    CO2 25.0 22.0 - 29.0 mmol/L    Calcium 9.5 8.6 - 10.5 mg/dL    BUN/Creatinine Ratio 20.5 7.0 - 25.0    Anion Gap 11.0 5.0 - 15.0 mmol/L    eGFR 92.5 >60.0 mL/min/1.73   ECG 12 Lead   Result Value Ref Range    QT Interval 426 ms    QTC Interval 446 ms       ECG 12 Lead    Date/Time: 10/10/2023 10:15 AM  Performed by: Gina Adamson MD    Authorized by: Gina Adamson MD  Comparison: compared with previous ECG from 7/21/2023  Similar to previous ECG  Rhythm: sinus rhythm  Rate: normal  BPM: 63  Conduction: conduction normal  Conduction: non-specific intraventricular conduction delay  ST Segments: ST segments normal  T Waves: T waves normal  QRS axis: left  Clinical impression comment: stable EKG        TODAY'S RHYTHM STRIP - sinus rhythm, LAD, IVCD, no PACs/PVCs    10/1/23 CT head - normal    10/1/23 ER - normal BMP, CBC, D-dimer 0.44, neg troponin    ASSESSMENT/PLAN    Diagnoses and all orders for this visit:    1. Syncope, unspecified syncope type (Primary)  Comments:  10-15 sec, sudden onset; ddx neuro vs cardiac etiology; check EEG, ECHO, carotid u/s, Holter; advised against driving until further eval  Orders:  -     ECG 12 Lead  -     Adult Transthoracic Echo Complete W/ Cont if Necessary Per Protocol; Future  -     Duplex Carotid  Ultrasound CAR; Future  -     Holter Monitor - 72 Hour Up To 15 Days; Future  -     EEG; Future    2. Hypertension  Assessment & Plan:  BP stable 128/70; no meds    Orders:  -     ECG 12 Lead    3. Dizziness  Comments:  sequelae of syncope vs inner ear dysfunction; cont zyrtec/flonase; CT head neg; consider brain MRI if syncope w/u is neg  Orders:  -     methylPREDNISolone (MEDROL) 4 MG dose pack; Take as directed on package instructions.  Dispense: 21 tablet; Refill: 0    4. Postauricular pain  Comments:  no evidence of OM or sinusitis; no focal neuro deficits; no adenopathy on exam; RX medrol; f/u prn  Orders:  -     methylPREDNISolone (MEDROL) 4 MG dose pack; Take as directed on package instructions.  Dispense: 21 tablet; Refill: 0    5. Vaccine counseling  Comments:  rec flu vacc, COVID19 vacc, and RSV vacc - get at pharmacy        FOLLOW-UP  Health maintenance - flu vaccine and RSV vaccine done 9/23, rec COVID19 vaccine, counseling given; also due for Td this year (last Tdap 9/13)  F/u on results of ECHO, carotid u/s, EEG, Holter  RTC for AWV 11/16/23; fasting labs prior to appt (CBC, CMP, TSH, lipids, UA/micro, PSA, A1C, microalb)     Electronically signed by:    Gina Adamson MD, FACP  10/10/2023

## 2023-10-10 ENCOUNTER — OFFICE VISIT (OUTPATIENT)
Dept: INTERNAL MEDICINE | Facility: CLINIC | Age: 68
End: 2023-10-10
Payer: MEDICARE

## 2023-10-10 VITALS
DIASTOLIC BLOOD PRESSURE: 70 MMHG | SYSTOLIC BLOOD PRESSURE: 128 MMHG | HEART RATE: 71 BPM | BODY MASS INDEX: 27.99 KG/M2 | WEIGHT: 189 LBS | OXYGEN SATURATION: 98 % | HEIGHT: 69 IN

## 2023-10-10 DIAGNOSIS — R55 SYNCOPE, UNSPECIFIED SYNCOPE TYPE: Primary | ICD-10-CM

## 2023-10-10 DIAGNOSIS — I10 ESSENTIAL HYPERTENSION: Chronic | ICD-10-CM

## 2023-10-10 DIAGNOSIS — R42 DIZZINESS: ICD-10-CM

## 2023-10-10 DIAGNOSIS — R51.9 POSTAURICULAR PAIN: ICD-10-CM

## 2023-10-10 DIAGNOSIS — Z71.85 VACCINE COUNSELING: ICD-10-CM

## 2023-10-10 RX ORDER — METHYLPREDNISOLONE 4 MG/1
TABLET ORAL
Qty: 21 TABLET | Refills: 0 | Status: SHIPPED | OUTPATIENT
Start: 2023-10-10 | End: 2023-10-16

## 2023-10-18 NOTE — PROGRESS NOTES
[facility]  [facility] Val[facility]    Chief Complaint  Loss of Consciousness and Dizziness    Subjective    History of Present Illness {CC  Problem List  Visit  Diagnosis   Encounters  Notes  Medications  Labs  Result Review Imaging  Media :23}     Yoseph Granados Jr. is a 68 y.o. male with right bundle branch block, hyperlipidemia, hypertension who presents today for evaluation of syncope at the request of .    Patient was in [place] for a wedding and had a syncopal event on 10/1.  He reports he drank 1 beer while at the wedding.  The following morning after loading luggage into his car he sat down the  seat and had a brief syncopal episode.  His wife was in the passenger seat and she believes he was out for 10-15 seconds. Says he was slumped over and snoring. He woke up and quickly returned to his baseline mental status.  He did have urine incontinence.  He did eat breakfast that morning and says he was hydrated.  He had taken multiple trips to the car without exertional symptoms.  Denies any associated chest pain, shortness of breath or palpitations.  EKG and labs stable.  CT head normal.  He denies any symptoms prior but did feel dizzy afterwards. He says he did have some dizziness and posterior headaches that day.    He reports another episode of syncope back in April 2021. He says he was walking in his house and just collapsed.  Again, had no premonitory symptoms.  This episode was attributed to dehydration.    He reports since the episode he has had persistent dizziness and some pain the posterior right side of his head that he thought was related to sinuses. He was given some steroids without symptoms. He has noticed some numbness in this area and occasionally in the right side of his face and right arm. This is intermittent. He also notes some numbness of his hands. Denies any chest pain. Notes some exertional dyspnea. He is active and walks 4-5 times a week about 3  "miles usually. Wife thinks he is more dyspneic    He does report that his brother had multiple syncopal episodes and was eventually diagnosed with arrhythmia and required a pacemaker.    Echo, carotid duplex and Holter monitor ordered. Testing not scheduled December    Had stress test in 2005       Cardiac risks: HLD, HTN, age, gender, family hx (mother had CABG in late 60s as well as congenital heart defect, father had first MI at age 50, valve replacement, aneurysm and pacemaker, brother had MI and SCD 39)    Objective     Vital Signs:   Vitals:    10/19/23 0857 10/19/23 0859 10/19/23 0900   BP: 162/75 162/79 163/74   BP Location: Right arm Left arm Left arm   Patient Position: Sitting Standing Sitting   Cuff Size: Adult Adult Adult   Pulse: 72 77 72   Resp:   16   Temp: 98.1 °F (36.7 °C) 98.1 °F (36.7 °C) 98.1 °F (36.7 °C)   TempSrc:   Temporal   SpO2: 99% 98% 99%   Weight:   86.4 kg (190 lb 6.4 oz)   Height:   172.7 cm (68\")     Body mass index is 28.95 kg/m².  Physical Exam  Vitals reviewed.   Constitutional:       Appearance: Normal appearance.   HENT:      Head: Normocephalic.   Neck:      Vascular: No carotid bruit.   Cardiovascular:      Rate and Rhythm: Normal rate and regular rhythm.      Pulses: Normal pulses.      Heart sounds: Normal heart sounds, S1 normal and S2 normal. No murmur heard.  Pulmonary:      Effort: Pulmonary effort is normal. No respiratory distress.      Breath sounds: Normal breath sounds.   Chest:      Chest wall: No tenderness.   Abdominal:      General: Abdomen is flat.      Palpations: Abdomen is soft.   Musculoskeletal:      Cervical back: Neck supple.      Right lower leg: No edema.      Left lower leg: No edema.   Skin:     General: Skin is warm and dry.   Neurological:      General: No focal deficit present.      Mental Status: He is alert and oriented to person, place, and time. Mental status is at baseline.      Motor: No weakness.   Psychiatric:         Mood and Affect: Mood " normal.         Behavior: Behavior normal.         Thought Content: Thought content normal.              Result Review  Data Reviewed:{ Labs  Result Review  Imaging  Med Tab  Media :23}   Holter Monitor - 72 Hour Up To 15 Days (10/10/2023 10:33)  ECG 12 Lead (10/10/2023 10:15)  ECG 12 Lead (07/21/2023 09:07)  Reviewed recent ED visit and PCP notes  Urinalysis With Culture If Indicated - (10/01/2023 11:43)  CBC AND DIFFERENTIAL (10/01/2023 10:47)  BASIC METABOLIC PANEL (10/01/2023 10:47)  TROPONIN (10/01/2023 10:47)  D-DIMER, QUANTITATIVE (10/01/2023 10:47)  Hemoglobin A1c (07/21/2023 08:38)  Lipid Panel (11/10/2022 08:02)         Assessment and Plan {CC Problem List  Visit Diagnosis  ROS  Review (Popup)  Health Maintenance  Quality  BestPractice  Medications  SmartSets  SnapShot Encounters  Media :23}   1. Syncope, unspecified syncope type  - Symptoms concerning for arrhythmias. Will do 30 day MCOT  -Advised to go to ED with any worsening paresthesias, weakness, slurred speech, facial droop or recurrent syncope  - MRI brain w contrast; Future  - Mobile Cardiac Outpatient Telemetry; Future  - Adult Transthoracic Echo Complete W/ Cont if Necessary Per Protocol; Future  - Stress Test With Myocardial Perfusion (1 Day); Future    2. RIDDLE (dyspnea on exertion)  -Possible anginal equivalent.  Multiple ASCVD risks  - Adult Transthoracic Echo Complete W/ Cont if Necessary Per Protocol; Future  - Stress Test With Myocardial Perfusion (1 Day); Future    3. Family history of premature CAD    - Adult Transthoracic Echo Complete W/ Cont if Necessary Per Protocol; Future  - Stress Test With Myocardial Perfusion (1 Day); Future    4. Hypertension  -Ports history of whitecoat hypertension.  He reliably monitors his blood pressure at home and it usually is in the 120s.  Continue to monitor and bring readings to next follow-up  - Adult Transthoracic Echo Complete W/ Cont if Necessary Per Protocol; Future  - Stress Test  With Myocardial Perfusion (1 Day); Future    5. Paresthesias  - Stat MRI of the brain which is scheduled for tonight.  ED precautions discussed  - MRI brain w contrast; Future  - Adult Transthoracic Echo Complete W/ Cont if Necessary Per Protocol; Future  - Stress Test With Myocardial Perfusion (1 Day); Future    6. Dizziness    - Mobile Cardiac Outpatient Telemetry; Future  - Adult Transthoracic Echo Complete W/ Cont if Necessary Per Protocol; Future  - Stress Test With Myocardial Perfusion (1 Day); Future    I spent 40 minutes caring for Yoseph on this date of service. This time includes time spent by me in the following activities:preparing for the visit, reviewing tests, obtaining and/or reviewing a separately obtained history, performing a medically appropriate examination and/or evaluation , counseling and educating the patient/family/caregiver, ordering medications, tests, or procedures, and documenting information in the medical record    Follow Up {Instructions Charge Capture  Follow-up Communications :23}   Return in about 6 weeks (around 11/30/2023) for Monitor results, Video Visit.    Patient was given instructions and counseling regarding his condition or for health maintenance advice. Please see specific information pulled into the AVS if appropriate.  Advised to call the [facility] Center with any questions, concerns, or worsening symptoms.

## 2023-10-19 ENCOUNTER — HOSPITAL ENCOUNTER (OUTPATIENT)
Dept: CARDIOLOGY | Facility: HOSPITAL | Age: 68
Discharge: HOME | End: 2023-10-19
Payer: MEDICARE

## 2023-10-19 ENCOUNTER — HOSPITAL ENCOUNTER (OUTPATIENT)
Dept: MRI IMAGING | Facility: HOSPITAL | Age: 68
Discharge: HOME | End: 2023-10-19
Payer: MEDICARE

## 2023-10-19 ENCOUNTER — OFFICE VISIT (OUTPATIENT)
Dept: CARDIOLOGY | Facility: HOSPITAL | Age: 68
End: 2023-10-19
Payer: MEDICARE

## 2023-10-19 VITALS
HEART RATE: 72 BPM | DIASTOLIC BLOOD PRESSURE: 74 MMHG | BODY MASS INDEX: 28.85 KG/M2 | OXYGEN SATURATION: 99 % | SYSTOLIC BLOOD PRESSURE: 163 MMHG | HEIGHT: 68 IN | TEMPERATURE: 98.1 F | WEIGHT: 190.4 LBS | RESPIRATION RATE: 16 BRPM

## 2023-10-19 DIAGNOSIS — R06.09 DOE (DYSPNEA ON EXERTION): Primary | ICD-10-CM

## 2023-10-19 DIAGNOSIS — R55 SYNCOPE, UNSPECIFIED SYNCOPE TYPE: ICD-10-CM

## 2023-10-19 DIAGNOSIS — R20.2 PARESTHESIAS: ICD-10-CM

## 2023-10-19 DIAGNOSIS — I10 ESSENTIAL HYPERTENSION: Chronic | ICD-10-CM

## 2023-10-19 DIAGNOSIS — Z82.49 FAMILY HISTORY OF PREMATURE CAD: ICD-10-CM

## 2023-10-19 DIAGNOSIS — R42 DIZZINESS: ICD-10-CM

## 2023-10-19 PROCEDURE — 0 GADOBENATE DIMEGLUMINE 529 MG/ML SOLUTION: Performed by: NURSE PRACTITIONER

## 2023-10-19 PROCEDURE — 70553 MRI BRAIN STEM W/O & W/DYE: CPT

## 2023-10-19 PROCEDURE — A9577 INJ MULTIHANCE: HCPCS | Performed by: NURSE PRACTITIONER

## 2023-10-19 RX ADMIN — GADOBENATE DIMEGLUMINE 18 ML: 529 INJECTION, SOLUTION INTRAVENOUS at 20:14

## 2023-10-19 NOTE — PROGRESS NOTES
[facility] Heart Monitor Documentation    Yoseph Granados Jr.  1955  [id number]  10/19/23      [] ZIO XT Patch  Model [id number] Prescribed for  Days    Serial Number: (N + 9 Digits) N   Apply-By Date on Box:   USPS Tracking Number:   USPS Tracking        [] [employer] MINI PLUS Mobile Cardiac Telemetry  Model BGMINIPLUS Prescribed for 30 Days    Serial Number: (BGM + 7 Digits) [id number]  Shipped-By Date on Box: 10/06/23  UPS Tracking Number: [id number]  UPS Tracking      [] [employer] MINI Holter Monitor  Model BGMINIEL Prescribed for  Days    Serial Number: (7 Digits)   Shipped-By Date on Box:   UPS Tracking Number: [id number]  UPS Tracking        This monitor was applied to the patient's chest and checked for proper functioning.  Mr. Yoseph Granados Jr. was instructed in the proper use of this monitor.  He was given the opportunity to ask questions and left the office with the device 's instruction manual.    Becka Gayle MA, 10:27 EDT, 10/19/23                  [facility]

## 2023-10-20 ENCOUNTER — HOSPITAL ENCOUNTER (OUTPATIENT)
Dept: CARDIOLOGY | Facility: HOSPITAL | Age: 68
Discharge: HOME | End: 2023-10-20
Payer: MEDICARE

## 2023-10-20 VITALS — HEIGHT: 68 IN | BODY MASS INDEX: 28.79 KG/M2 | WEIGHT: 190 LBS

## 2023-10-20 DIAGNOSIS — I10 ESSENTIAL HYPERTENSION: Chronic | ICD-10-CM

## 2023-10-20 DIAGNOSIS — R20.2 PARESTHESIAS: ICD-10-CM

## 2023-10-20 DIAGNOSIS — R06.09 DOE (DYSPNEA ON EXERTION): ICD-10-CM

## 2023-10-20 DIAGNOSIS — Z82.49 FAMILY HISTORY OF PREMATURE CAD: ICD-10-CM

## 2023-10-20 DIAGNOSIS — R55 SYNCOPE, UNSPECIFIED SYNCOPE TYPE: ICD-10-CM

## 2023-10-20 DIAGNOSIS — R42 DIZZINESS: ICD-10-CM

## 2023-10-20 LAB
ASCENDING AORTA: 2.9 CM
BH CV ECHO MEAS - AO MAX PG: 7.2 MMHG
BH CV ECHO MEAS - AO MEAN PG: 3.7 MMHG
BH CV ECHO MEAS - AO ROOT DIAM: 3.4 CM
BH CV ECHO MEAS - AO V2 MAX: 134.3 CM/SEC
BH CV ECHO MEAS - AO V2 VTI: 29.4 CM
BH CV ECHO MEAS - AVA(I,D): 2.3 CM2
BH CV ECHO MEAS - EDV(CUBED): 132.5 ML
BH CV ECHO MEAS - EDV(MOD-SP2): 100.7 ML
BH CV ECHO MEAS - EDV(MOD-SP4): 112.5 ML
BH CV ECHO MEAS - EF(MOD-BP): 56 %
BH CV ECHO MEAS - EF(MOD-SP2): 46.1 %
BH CV ECHO MEAS - EF(MOD-SP4): 63.7 %
BH CV ECHO MEAS - ESV(CUBED): 55.4 ML
BH CV ECHO MEAS - ESV(MOD-SP2): 54.3 ML
BH CV ECHO MEAS - ESV(MOD-SP4): 40.8 ML
BH CV ECHO MEAS - FS: 25.2 %
BH CV ECHO MEAS - IVS/LVPW: 1.24 CM
BH CV ECHO MEAS - IVSD: 1.06 CM
BH CV ECHO MEAS - LA DIMENSION: 3.8 CM
BH CV ECHO MEAS - LAT PEAK E' VEL: 12 CM/SEC
BH CV ECHO MEAS - LV DIASTOLIC VOL/BSA (35-75): 56.2 CM2
BH CV ECHO MEAS - LV MASS(C)D: 177.3 GRAMS
BH CV ECHO MEAS - LV MAX PG: 3.4 MMHG
BH CV ECHO MEAS - LV MEAN PG: 1.92 MMHG
BH CV ECHO MEAS - LV SYSTOLIC VOL/BSA (12-30): 20.4 CM2
BH CV ECHO MEAS - LV V1 MAX: 92.1 CM/SEC
BH CV ECHO MEAS - LV V1 VTI: 20.7 CM
BH CV ECHO MEAS - LVIDD: 5.1 CM
BH CV ECHO MEAS - LVIDS: 3.8 CM
BH CV ECHO MEAS - LVOT AREA: 3.3 CM2
BH CV ECHO MEAS - LVOT DIAM: 2.04 CM
BH CV ECHO MEAS - LVPWD: 0.86 CM
BH CV ECHO MEAS - MED PEAK E' VEL: 8 CM/SEC
BH CV ECHO MEAS - MV A MAX VEL: 93.6 CM/SEC
BH CV ECHO MEAS - MV DEC SLOPE: 542.7 CM/SEC2
BH CV ECHO MEAS - MV DEC TIME: 0.16 SEC
BH CV ECHO MEAS - MV E MAX VEL: 85.6 CM/SEC
BH CV ECHO MEAS - MV E/A: 0.91
BH CV ECHO MEAS - MV MAX PG: 3.5 MMHG
BH CV ECHO MEAS - MV MEAN PG: 1.95 MMHG
BH CV ECHO MEAS - MV P1/2T: 115 MSEC
BH CV ECHO MEAS - MV V2 VTI: 31.9 CM
BH CV ECHO MEAS - MVA(P1/2T): 1.9 CM2
BH CV ECHO MEAS - MVA(VTI): 2.12 CM2
BH CV ECHO MEAS - PA ACC TIME: 0.16 SEC
BH CV ECHO MEAS - PA V2 MAX: 114.2 CM/SEC
BH CV ECHO MEAS - RAP SYSTOLE: 3 MMHG
BH CV ECHO MEAS - RVSP: 23.7 MMHG
BH CV ECHO MEAS - SI(MOD-SP2): 23.2 ML/M2
BH CV ECHO MEAS - SI(MOD-SP4): 35.8 ML/M2
BH CV ECHO MEAS - SV(LVOT): 67.6 ML
BH CV ECHO MEAS - SV(MOD-SP2): 46.4 ML
BH CV ECHO MEAS - SV(MOD-SP4): 71.6 ML
BH CV ECHO MEAS - TAPSE (>1.6): 1.9 CM
BH CV ECHO MEAS - TR MAX PG: 20.7 MMHG
BH CV ECHO MEAS - TR MAX VEL: 227.6 CM/SEC
BH CV ECHO MEASUREMENTS AVERAGE E/E' RATIO: 8.56
BH CV ECHO SHUNT ASSESSMENT PERFORMED (HIDDEN SCRIPTING): 1
BH CV VAS BP LEFT ARM: NORMAL MMHG
BH CV VAS BP RIGHT ARM: NORMAL MMHG
BH CV XLRA - RV BASE: 3.7 CM
BH CV XLRA - RV LENGTH: 6.1 CM
BH CV XLRA - RV MID: 3.4 CM
BH CV XLRA - TDI S': 14 CM/SEC
BH CV XLRA MEAS LEFT DIST CCA EDV: 22.6 CM/SEC
BH CV XLRA MEAS LEFT DIST CCA PSV: 90.1 CM/SEC
BH CV XLRA MEAS LEFT DIST ICA EDV: 33.3 CM/SEC
BH CV XLRA MEAS LEFT DIST ICA PSV: 93.7 CM/SEC
BH CV XLRA MEAS LEFT ICA/CCA RATIO: 1.24
BH CV XLRA MEAS LEFT MID CCA EDV: 19.8 CM/SEC
BH CV XLRA MEAS LEFT MID CCA PSV: 84.5 CM/SEC
BH CV XLRA MEAS LEFT MID ICA EDV: 30.2 CM/SEC
BH CV XLRA MEAS LEFT MID ICA PSV: 104.7 CM/SEC
BH CV XLRA MEAS LEFT PROX CCA EDV: 19.1 CM/SEC
BH CV XLRA MEAS LEFT PROX CCA PSV: 98.1 CM/SEC
BH CV XLRA MEAS LEFT PROX ECA EDV: 20.7 CM/SEC
BH CV XLRA MEAS LEFT PROX ECA PSV: 99.4 CM/SEC
BH CV XLRA MEAS LEFT PROX ICA EDV: 25.2 CM/SEC
BH CV XLRA MEAS LEFT PROX ICA PSV: 92.6 CM/SEC
BH CV XLRA MEAS LEFT PROX SCLA EDV: 0 CM/SEC
BH CV XLRA MEAS LEFT PROX SCLA PSV: 199.9 CM/SEC
BH CV XLRA MEAS LEFT VERTEBRAL A EDV: 14.2 CM/SEC
BH CV XLRA MEAS LEFT VERTEBRAL A PSV: 50.7 CM/SEC
BH CV XLRA MEAS RIGHT DIST CCA EDV: 17.3 CM/SEC
BH CV XLRA MEAS RIGHT DIST CCA PSV: 79.8 CM/SEC
BH CV XLRA MEAS RIGHT DIST ICA EDV: 26.3 CM/SEC
BH CV XLRA MEAS RIGHT DIST ICA PSV: 76.9 CM/SEC
BH CV XLRA MEAS RIGHT ICA/CCA RATIO: 1.09
BH CV XLRA MEAS RIGHT MID CCA EDV: 18.9 CM/SEC
BH CV XLRA MEAS RIGHT MID CCA PSV: 84.5 CM/SEC
BH CV XLRA MEAS RIGHT MID ICA EDV: 31.9 CM/SEC
BH CV XLRA MEAS RIGHT MID ICA PSV: 92.3 CM/SEC
BH CV XLRA MEAS RIGHT PROX CCA EDV: 17.3 CM/SEC
BH CV XLRA MEAS RIGHT PROX CCA PSV: 76.7 CM/SEC
BH CV XLRA MEAS RIGHT PROX ECA EDV: 16.7 CM/SEC
BH CV XLRA MEAS RIGHT PROX ECA PSV: 128.4 CM/SEC
BH CV XLRA MEAS RIGHT PROX ICA EDV: 23.1 CM/SEC
BH CV XLRA MEAS RIGHT PROX ICA PSV: 65.3 CM/SEC
BH CV XLRA MEAS RIGHT PROX SCLA EDV: 0 CM/SEC
BH CV XLRA MEAS RIGHT PROX SCLA PSV: 142.4 CM/SEC
BH CV XLRA MEAS RIGHT VERTEBRAL A EDV: 17.5 CM/SEC
BH CV XLRA MEAS RIGHT VERTEBRAL A PSV: 70 CM/SEC
IVRT: 88 MS
LEFT ARM BP: NORMAL MMHG
LEFT ATRIUM VOLUME INDEX: 25.8 ML/M2
RIGHT ARM BP: NORMAL MMHG

## 2023-10-20 PROCEDURE — 93306 TTE W/DOPPLER COMPLETE: CPT

## 2023-10-20 PROCEDURE — 93880 EXTRACRANIAL BILAT STUDY: CPT

## 2023-10-23 NOTE — PROGRESS NOTES
Your echocardiogram is within acceptable limits.   Your heart squeezes normally.  There are no significant valvular abnormalities noted. Please let me know if you have any questions or concerns

## 2023-10-26 ENCOUNTER — TELEPHONE (OUTPATIENT)
Dept: CARDIOLOGY | Facility: HOSPITAL | Age: 68
End: 2023-10-26
Payer: MEDICARE

## 2023-10-26 NOTE — TELEPHONE ENCOUNTER
----- Message from Becka Gayle MA sent at 10/26/2023  8:56 AM EDT -----  They still don't have final copy, they will send me the results once he's signed off on it. She said it could be as early as this afternoon but she's unsure of when it will be completed.  ----- Message -----  From: Laury Duarte APRN  Sent: 10/26/2023   8:30 AM EDT  To: Becka Gayle MA    Can you get stress test results from Dr. Hopkins please?    Thank you!

## 2023-10-31 ENCOUNTER — TELEPHONE (OUTPATIENT)
Dept: CARDIOLOGY | Facility: HOSPITAL | Age: 68
End: 2023-10-31
Payer: MEDICARE

## 2023-10-31 NOTE — TELEPHONE ENCOUNTER
----- Message from Becka Gayle MA sent at 10/31/2023  1:30 PM EDT -----  I had previously but I called and asked for it again and I'm going to send a cover sheet as well.  ----- Message -----  From: Laury Duarte APRN  Sent: 10/31/2023  12:17 PM EDT  To: Becka Gayle MA    If you haven't already, can you see if you can get stress test results from Dr. Hopkins's office?    Thanks!

## 2023-11-03 DIAGNOSIS — E78.5 DYSLIPIDEMIA: Chronic | ICD-10-CM

## 2023-11-03 DIAGNOSIS — R73.01 IMPAIRED FASTING GLUCOSE: Chronic | ICD-10-CM

## 2023-11-03 DIAGNOSIS — Z00.00 MEDICARE ANNUAL WELLNESS VISIT, SUBSEQUENT: Primary | Chronic | ICD-10-CM

## 2023-11-03 DIAGNOSIS — Z12.5 SCREENING FOR PROSTATE CANCER: ICD-10-CM

## 2023-11-09 ENCOUNTER — TELEPHONE (OUTPATIENT)
Dept: CARDIOLOGY | Facility: HOSPITAL | Age: 68
End: 2023-11-09
Payer: MEDICARE

## 2023-11-09 ENCOUNTER — LAB (OUTPATIENT)
Dept: LAB | Facility: HOSPITAL | Age: 68
End: 2023-11-09
Payer: MEDICARE

## 2023-11-09 DIAGNOSIS — I48.0 PAROXYSMAL ATRIAL FIBRILLATION: ICD-10-CM

## 2023-11-09 DIAGNOSIS — E78.5 DYSLIPIDEMIA: Chronic | ICD-10-CM

## 2023-11-09 DIAGNOSIS — Z12.5 SCREENING FOR PROSTATE CANCER: ICD-10-CM

## 2023-11-09 DIAGNOSIS — R55 SYNCOPE, UNSPECIFIED SYNCOPE TYPE: Primary | ICD-10-CM

## 2023-11-09 DIAGNOSIS — R73.01 IMPAIRED FASTING GLUCOSE: Chronic | ICD-10-CM

## 2023-11-09 DIAGNOSIS — Z00.00 MEDICARE ANNUAL WELLNESS VISIT, SUBSEQUENT: Chronic | ICD-10-CM

## 2023-11-09 NOTE — TELEPHONE ENCOUNTER
Called patient with event monitor transmission which showed an episode of AF with RVR this morning. He says he was getting blood drawn at the time and did feel lightheaded. Recommended that he go ahead and start eliquis 5mg since chadsvasc score 2. Will refer to cardiology to determine long term management.  Advised to stop aspirin and to avoid NSAIDs.  He verbalized understanding with no further questions or concerns

## 2023-11-10 LAB
ALBUMIN SERPL-MCNC: 4.8 G/DL (ref 3.5–5.2)
ALBUMIN/CREAT UR: <7 MG/G CREAT (ref 0–29)
ALBUMIN/GLOB SERPL: 3 G/DL
ALP SERPL-CCNC: 90 U/L (ref 39–117)
ALT SERPL-CCNC: 23 U/L (ref 1–41)
APPEARANCE UR: CLEAR
AST SERPL-CCNC: 21 U/L (ref 1–40)
BACTERIA #/AREA URNS HPF: NORMAL /HPF
BASOPHILS # BLD AUTO: 0.07 10*3/MM3 (ref 0–0.2)
BASOPHILS NFR BLD AUTO: 1 % (ref 0–1.5)
BILIRUB SERPL-MCNC: 0.5 MG/DL (ref 0–1.2)
BILIRUB UR QL STRIP: NEGATIVE
BUN SERPL-MCNC: 13 MG/DL (ref 8–23)
BUN/CREAT SERPL: 12.7 (ref 7–25)
CALCIUM SERPL-MCNC: 9.6 MG/DL (ref 8.6–10.5)
CASTS URNS MICRO: NORMAL
CHLORIDE SERPL-SCNC: 102 MMOL/L (ref 98–107)
CHOLEST SERPL-MCNC: 197 MG/DL (ref 0–200)
CO2 SERPL-SCNC: 27.2 MMOL/L (ref 22–29)
COLOR UR: YELLOW
CREAT SERPL-MCNC: 1.02 MG/DL (ref 0.76–1.27)
CREAT UR-MCNC: 42.2 MG/DL
EGFRCR SERPLBLD CKD-EPI 2021: 79.1 ML/MIN/1.73
EOSINOPHIL # BLD AUTO: 0.4 10*3/MM3 (ref 0–0.4)
EOSINOPHIL NFR BLD AUTO: 5.6 % (ref 0.3–6.2)
EPI CELLS #/AREA URNS HPF: NORMAL /HPF
ERYTHROCYTE [DISTWIDTH] IN BLOOD BY AUTOMATED COUNT: 11.9 % (ref 12.3–15.4)
GLOBULIN SER CALC-MCNC: 1.6 GM/DL
GLUCOSE SERPL-MCNC: 96 MG/DL (ref 65–99)
GLUCOSE UR QL STRIP: NEGATIVE
HBA1C MFR BLD: 5.7 % (ref 4.8–5.6)
HCT VFR BLD AUTO: 46.9 % (ref 37.5–51)
HDLC SERPL-MCNC: 41 MG/DL (ref 40–60)
HGB BLD-MCNC: 15.9 G/DL (ref 13–17.7)
HGB UR QL STRIP: NEGATIVE
IMM GRANULOCYTES # BLD AUTO: 0.02 10*3/MM3 (ref 0–0.05)
IMM GRANULOCYTES NFR BLD AUTO: 0.3 % (ref 0–0.5)
KETONES UR QL STRIP: NEGATIVE
LDLC SERPL CALC-MCNC: 128 MG/DL (ref 0–100)
LEUKOCYTE ESTERASE UR QL STRIP: NEGATIVE
LYMPHOCYTES # BLD AUTO: 1.46 10*3/MM3 (ref 0.7–3.1)
LYMPHOCYTES NFR BLD AUTO: 20.6 % (ref 19.6–45.3)
MCH RBC QN AUTO: 30.6 PG (ref 26.6–33)
MCHC RBC AUTO-ENTMCNC: 33.9 G/DL (ref 31.5–35.7)
MCV RBC AUTO: 90.2 FL (ref 79–97)
MICROALBUMIN UR-MCNC: <3 UG/ML
MONOCYTES # BLD AUTO: 0.7 10*3/MM3 (ref 0.1–0.9)
MONOCYTES NFR BLD AUTO: 9.9 % (ref 5–12)
NEUTROPHILS # BLD AUTO: 4.43 10*3/MM3 (ref 1.7–7)
NEUTROPHILS NFR BLD AUTO: 62.6 % (ref 42.7–76)
NITRITE UR QL STRIP: NEGATIVE
NRBC BLD AUTO-RTO: 0 /100 WBC (ref 0–0.2)
PH UR STRIP: 7 [PH] (ref 5–8)
PLATELET # BLD AUTO: 244 10*3/MM3 (ref 140–450)
POTASSIUM SERPL-SCNC: 4.5 MMOL/L (ref 3.5–5.2)
PROT SERPL-MCNC: 6.4 G/DL (ref 6–8.5)
PROT UR QL STRIP: NEGATIVE
PSA SERPL-MCNC: 0.84 NG/ML (ref 0–4)
RBC # BLD AUTO: 5.2 10*6/MM3 (ref 4.14–5.8)
RBC #/AREA URNS HPF: NORMAL /HPF
SODIUM SERPL-SCNC: 141 MMOL/L (ref 136–145)
SP GR UR STRIP: 1.01 (ref 1–1.03)
TRIGL SERPL-MCNC: 155 MG/DL (ref 0–150)
TSH SERPL DL<=0.005 MIU/L-ACNC: 3.54 UIU/ML (ref 0.27–4.2)
UROBILINOGEN UR STRIP-MCNC: NORMAL MG/DL
VLDLC SERPL CALC-MCNC: 28 MG/DL (ref 5–40)
WBC # BLD AUTO: 7.08 10*3/MM3 (ref 3.4–10.8)
WBC #/AREA URNS HPF: NORMAL /HPF

## 2023-11-15 PROBLEM — I48.0 PAROXYSMAL ATRIAL FIBRILLATION: Status: ACTIVE | Noted: 2023-11-15

## 2023-11-15 NOTE — PROGRESS NOTES
ANNUAL WELLNESS VISIT    DRUG AND ALCOHOL USE      no tobacco use, alcohol intake:1 beers per month, and caffeine intake: 2 cups of caffeinated coffee per day    DIET AND PHYSICAL ACTIVITY     Diet: general    Exercise: frequently   Exercise Details: walking - overall decreased due to back pain issues earlier this year    MOOD DISORDER AND COGNITIVE SCREENING     PHQ-2 Depression Screening - components reviewed with patient; screening is negative for active depression.    Little interest or pleasure in doing things? 0-->not at all   Feeling down, depressed, or hopeless? 0-->not at all   PHQ-2 Total Score 0      Anxiety Screening Tool Used YES     AUDIT screening 1     Mini-Cog Performed   Yes    1. Tell Patient 3 Words Car table house    2. Administer Clock Test normal    3. Recall 3 words  Car table house    4. Number Correct Items 3    FUNCTIONAL ABILITY AND LEVEL OF SAFETY   Hearing no hearing loss     Wears Hearing Aids No       Current Activities Independent      none  - see Funct/Cog Status Intake     Fall Risk Assessment       Has difficulty with walking or balance  No         Timed Up and Go (TUG) Test  8 sec.       If >12 sec, normal    ADVANCED DIRECTIVE  Advance Care Planning   ACP discussion was held with the patient during this visit. Patient does not have an advance directive, information provided.  Advance Care Planning Discussion:  Patient does not have advanced directive and living will.  Reviewed desires for end of life care, which is to have comfort care.  Patient does not want extraordinary life-sustaining measures, including no prolonged artificial life support. Encouraged patient to ensure family is aware of desires/preferences. Confirmed wife is his healthcare surrogate.    PAIN SCREENING Do you have pain right now? no      If so, 1-10 scale: 0     Intermittent     Do you have pain every day? No      Probable chronic pain: No     Recent Hospitalizations:  No recent hospitalization(s)..      MEDICATION REVIEW   - updated and reviewed (see Medication List).   - reviewed for potentially harmful drug-disease interactions in the elderly.   - reviewed for high risk medications in the elderly.   - aspirin use: No, on eliquis 5mg Q12    BMI  Body mass index is 28.89 kg/m².     ___________________________________________________  Chief Complaint   Patient presents with    Medicare Wellness-subsequent    Hypertension       History of Present Illness  68 y.o.  male presents for updated phys examination and wellness visit as well as f/u on BP, cholesterol, and sugars. Back pain has resolved after surgery.     Has not had further fainting spells. Was placed on eliquis after noted to have brief spell of afib on heart monitor, supposedly with HR in the 140s. Patient was slightly lightheaded when episode occurred, but thought it could have been anything. Has not been having palpitations. Awaiting cardiology consultation. Still awaiting EEG - anxious to get this so he can resume driving.    Brings BP readings - have been 120-130s/70s with pulse in the 70-80s.     No longer on Symbicort; breathing has been stable.    Review of Systems  Denies headaches, visual changes, CP, palpitations, SOB, cough, abd pain, n/v/d, difficulty with urination, numbness/tingling, falls, mood changes, lightheadedness, hearing changes, rashes. No further syncopal episodes.    All other ROS reviewed and negative.    Current Outpatient Medications:     apixaban (ELIQUIS) 5 MG q12    carboxymethylcellulose (REFRESH PLUS) 0.5 % sol AD    cetirizine 10 MG QD    CHERRY QD    Cholecalciferol (VITAMIN D) 2000 UNITS QD    Cinnamon 500 MG QD    ELDERBERRY QD    fluticasone (FLONASE) 50 MCG/ACT nasal spray AD    CENTRUM SILVER ADULT 50+ QD    Omega-3 Krill Oil 1000 MG QD    Probiotic Product QD    triamcinolone (KENALOG) 0.025 % cream AD    Turmeric 500 MG QD     OPIOID SCREENING:  The patient does not have opioid prescription on his  "medication list. Medication list has been reviewed with the patient regarding potentially high risk medications and harmful drug interactions in patients over age 65.    VITALS:  /62   Pulse 81   Ht 172.7 cm (68\")   Wt 86.2 kg (190 lb)   SpO2 98%   BMI 28.89 kg/m²   Repeat BP left arm 146/62    Physical Exam  Vitals and nursing note reviewed.   Constitutional:       General: He is not in acute distress.     Appearance: Normal appearance. He is well-developed.   HENT:      Head: Normocephalic.      Right Ear: Ear canal and external ear normal.      Left Ear: Ear canal and external ear normal.      Nose: Nose normal.   Eyes:      Extraocular Movements: Extraocular movements intact.      Conjunctiva/sclera: Conjunctivae normal.      Pupils: Pupils are equal, round, and reactive to light.   Neck:      Vascular: No carotid bruit (bilaterally).   Cardiovascular:      Rate and Rhythm: Normal rate and regular rhythm.      Heart sounds: Normal heart sounds.   Pulmonary:      Effort: Pulmonary effort is normal. No respiratory distress.      Breath sounds: Normal breath sounds.   Abdominal:      General: Bowel sounds are normal. There is no distension.      Palpations: Abdomen is soft. There is no mass.      Tenderness: There is no abdominal tenderness.   Musculoskeletal:      Cervical back: Normal range of motion and neck supple.   Lymphadenopathy:      Cervical: No cervical adenopathy.   Skin:     General: Skin is warm and dry.      Findings: No rash.   Neurological:      Mental Status: He is alert.   Psychiatric:         Mood and Affect: Mood normal.         Behavior: Behavior normal.         LABS  Results for orders placed or performed in visit on 11/09/23   PSA Screen    Specimen: Blood    Blood  Release to ghulam   Result Value Ref Range    PSA 0.839 0.000 - 4.000 ng/mL   Hemoglobin A1c    Specimen: Blood    Blood  Release to ghulam   Result Value Ref Range    Hemoglobin A1C 5.70 (H) 4.80 - 5.60 %   Microalbumin / " Creatinine Urine Ratio -    Specimen: Blood    Blood  Release to ghulam   Result Value Ref Range    Creatinine, Urine 42.2 Not Estab. mg/dL    Microalbumin, Urine <3.0 Not Estab. ug/mL    Microalbumin/Creatinine Ratio <7 0 - 29 mg/g creat   TSH    Specimen: Blood    Blood  Release to ghulam   Result Value Ref Range    TSH 3.540 0.270 - 4.200 uIU/mL   Lipid Panel    Specimen: Blood    Blood  Release to ghulam   Result Value Ref Range    Total Cholesterol 197 0 - 200 mg/dL    Triglycerides 155 (H) 0 - 150 mg/dL    HDL Cholesterol 41 40 - 60 mg/dL    VLDL Cholesterol Leonardo 28 5 - 40 mg/dL    LDL Chol Calc (NIH) 128 (H) 0 - 100 mg/dL   Comprehensive Metabolic Panel    Specimen: Blood    Blood  Release to ghulam   Result Value Ref Range    Glucose 96 65 - 99 mg/dL    BUN 13 8 - 23 mg/dL    Creatinine 1.02 0.76 - 1.27 mg/dL    EGFR Result 79.1 >60.0 mL/min/1.73    BUN/Creatinine Ratio 12.7 7.0 - 25.0    Sodium 141 136 - 145 mmol/L    Potassium 4.5 3.5 - 5.2 mmol/L    Chloride 102 98 - 107 mmol/L    Total CO2 27.2 22.0 - 29.0 mmol/L    Calcium 9.6 8.6 - 10.5 mg/dL    Total Protein 6.4 6.0 - 8.5 g/dL    Albumin 4.8 3.5 - 5.2 g/dL    Globulin 1.6 gm/dL    A/G Ratio 3.0 g/dL    Total Bilirubin 0.5 0.0 - 1.2 mg/dL    Alkaline Phosphatase 90 39 - 117 U/L    AST (SGOT) 21 1 - 40 U/L    ALT (SGPT) 23 1 - 41 U/L   Microscopic Examination -    Blood  Release to ghulam   Result Value Ref Range    WBC, UA 0-2 /HPF    RBC, UA 0-2 /HPF    Epithelial Cells (non renal) 0-2 /HPF    Cast Type Comment     Bacteria, UA Comment None Seen /HPF   Urinalysis With Microscopic -    Specimen: Blood    Blood  Release to ghulam   Result Value Ref Range    Specific Gravity, UA 1.007 1.005 - 1.030    pH, UA 7.0 5.0 - 8.0    Color, UA Yellow     Appearance, UA Clear Clear    Leukocytes, UA Negative Negative    Protein Negative Negative    Glucose, UA Negative Negative    Ketones Negative Negative    Blood, UA Negative Negative    Bilirubin, UA Negative Negative     Urobilinogen, UA Comment     Nitrite, UA Negative Negative   CBC & Differential    Specimen: Blood    Blood  Release to ghulam   Result Value Ref Range    WBC 7.08 3.40 - 10.80 10*3/mm3    RBC 5.20 4.14 - 5.80 10*6/mm3    Hemoglobin 15.9 13.0 - 17.7 g/dL    Hematocrit 46.9 37.5 - 51.0 %    MCV 90.2 79.0 - 97.0 fL    MCH 30.6 26.6 - 33.0 pg    MCHC 33.9 31.5 - 35.7 g/dL    RDW 11.9 (L) 12.3 - 15.4 %    Platelets 244 140 - 450 10*3/mm3    Neutrophil Rel % 62.6 42.7 - 76.0 %    Lymphocyte Rel % 20.6 19.6 - 45.3 %    Monocyte Rel % 9.9 5.0 - 12.0 %    Eosinophil Rel % 5.6 0.3 - 6.2 %    Basophil Rel % 1.0 0.0 - 1.5 %    Neutrophils Absolute 4.43 1.70 - 7.00 10*3/mm3    Lymphocytes Absolute 1.46 0.70 - 3.10 10*3/mm3    Monocytes Absolute 0.70 0.10 - 0.90 10*3/mm3    Eosinophils Absolute 0.40 0.00 - 0.40 10*3/mm3    Basophils Absolute 0.07 0.00 - 0.20 10*3/mm3    Immature Granulocyte Rel % 0.3 0.0 - 0.5 %    Immature Grans Absolute 0.02 0.00 - 0.05 10*3/mm3    nRBC 0.0 0.0 - 0.2 /100 WBC     10/25/23 nl nuclear stress test, EF 55%    ASSESSMENT/PLAN    Diagnoses and all orders for this visit:    1. Medicare annual wellness visit, subsequent (Primary)  Assessment & Plan:  Health maintenance - COVID19 vacc 10/23; flu vacc 9/23; RSV 9/23; Prevnar 8/18; PVX 10/19; Td 10/23 (next Td due 2033), HAV and Shingrix done; colonosc due (last 7/17, repeat 2022 with Dr. George); PSA stable 0.839; eye exam with  Dr. Glenn LANTIGUA - being followed for cataracts; dental exam q6 mos; (+) seat belt use    Consultants:  Patient Care Team:  Gina Adamson MD as PCP - General  Massa, Franny Alegria MD as Consulting Physician (Dermatology)  Ewrin George MD as Consulting Physician (Gastroenterology)  Paramjit Trevino MD as Consulting Physician (Orthopedic Surgery)  Remington Eller DPM as Consulting Physician (Podiatry)  Leeanne Elizabeth MD as Consulting Physician (Ophthalmology)  Kavon Manzo, ROSSANA (Dental  General Practice)  Yoseph Bernal MD as Consulting Physician (Orthopedic Surgery)  Dilip Decker MD as Consulting Physician (Pain Medicine)  Hans Valentin MD as Surgeon (Neurosurgery)  Laury Duarte APRN as Nurse Practitioner (Neurology)        2. Impaired fasting glucose  Assessment & Plan:  BG control stable with A1C 5.7; encouraged reg phys activity to decr insulin resistance, moderation in unhealthy starches/sweets; f/u A1C in 6 mos        3. Hypertension  Assessment & Plan:  BP mildly elevated, unchanged on repeat; his home BP readings are borderline/acceptable; cont monitoring home BPs 2-3x/week with goal < 130/80; discussed low Na diet; expect improvement in BPs once he resume regular exercise; no meds; f/u in 6 mos at the latest, earlier if Bps remain abnl      4. Dyslipidemia  Assessment & Plan:  Lipids bord with  and bord TGs 155; goal TG < 150; decrease saturated fats and cholesterol in the diet; repeat lipids in 12 mos        5. Syncope, unspecified syncope type  Assessment & Plan:  ECHO unremarkable, isolated episode of afib/RVR noted on heart monitor - now on eliquis; EEG remains pending - not driving at this time; cardiology consult pending as well    Orders:  -     SCANNED - STRESS TEST    6. Paroxysmal atrial fibrillation  Assessment & Plan:  Started on eliquis 5mg q12 per ANAID Williamson; awaiting official Holter monitor results; advised to decr caffeine intake (currently drinking 3 cups of 1/2-caff coffee per day); a few ectopic beats auscultated when checking his BP manually but none on exam; patient also awaiting cardiology consultation for direction re: next steps          FOLLOW-UP  RTC 6 mos with A1C and BP check    Electronically signed by:    Gina Adamson MD, FACP  11/16/2023

## 2023-11-16 ENCOUNTER — OFFICE VISIT (OUTPATIENT)
Dept: INTERNAL MEDICINE | Facility: CLINIC | Age: 68
End: 2023-11-16
Payer: MEDICARE

## 2023-11-16 VITALS
WEIGHT: 190 LBS | BODY MASS INDEX: 28.79 KG/M2 | OXYGEN SATURATION: 98 % | HEART RATE: 81 BPM | HEIGHT: 68 IN | SYSTOLIC BLOOD PRESSURE: 146 MMHG | DIASTOLIC BLOOD PRESSURE: 62 MMHG

## 2023-11-16 DIAGNOSIS — E78.5 DYSLIPIDEMIA: Chronic | ICD-10-CM

## 2023-11-16 DIAGNOSIS — I10 ESSENTIAL HYPERTENSION: Chronic | ICD-10-CM

## 2023-11-16 DIAGNOSIS — I48.0 PAROXYSMAL ATRIAL FIBRILLATION: ICD-10-CM

## 2023-11-16 DIAGNOSIS — Z00.00 MEDICARE ANNUAL WELLNESS VISIT, SUBSEQUENT: Primary | Chronic | ICD-10-CM

## 2023-11-16 DIAGNOSIS — R55 SYNCOPE, UNSPECIFIED SYNCOPE TYPE: ICD-10-CM

## 2023-11-16 DIAGNOSIS — R73.01 IMPAIRED FASTING GLUCOSE: Chronic | ICD-10-CM

## 2023-11-16 NOTE — ASSESSMENT & PLAN NOTE
ECHO unremarkable, isolated episode of afib/RVR noted on heart monitor - now on eliquis; EEG remains pending - not driving at this time; cardiology consult pending as well

## 2023-11-16 NOTE — ASSESSMENT & PLAN NOTE
BP mildly elevated, unchanged on repeat; his home BP readings are borderline/acceptable; cont monitoring home BPs 2-3x/week with goal < 130/80; discussed low Na diet; expect improvement in BPs once he resume regular exercise; no meds; f/u in 6 mos at the latest, earlier if Bps remain abnl

## 2023-11-16 NOTE — ASSESSMENT & PLAN NOTE
Health maintenance - COVID19 vacc 10/23; flu vacc 9/23; RSV 9/23; Prevnar 8/18; PVX 10/19; Td 10/23 (next Td due 2033), HAV and Shingrix done; colonosc due (last 7/17, repeat 2022 with Dr. George); PSA stable 0.839; eye exam with  Dr. Elizabeth UTD - being followed for cataracts; dental exam q6 mos; (+) seat belt use    Consultants:  Patient Care Team:  Gina Adamson MD as PCP - General  [facility], Franny Alegria MD as Consulting Physician (Dermatology)  Erwin George MD as Consulting Physician (Gastroenterology)  Paramjit Trevino MD as Consulting Physician (Orthopedic Surgery)  Remington Eller DPM as Consulting Physician (Podiatry)  Leeanne Elizabeth MD as Consulting Physician (Ophthalmology)  Kavon Manzo, ROSSANA (Dental General Practice)  Yoseph Bernal MD as Consulting Physician (Orthopedic Surgery)  Dilip Decker MD as Consulting Physician (Pain Medicine)  Hans Valentin MD as Surgeon (Neurosurgery)  Laury Duarte APRN as Nurse Practitioner (Neurology)

## 2023-11-16 NOTE — ASSESSMENT & PLAN NOTE
Lipids bord with  and bord TGs 155; goal TG < 150; decrease saturated fats and cholesterol in the diet; repeat lipids in 12 mos

## 2023-11-17 NOTE — ASSESSMENT & PLAN NOTE
Started on eliquis 5mg q12 per L. ANAID Duarte; awaiting official Holter monitor results; advised to decr caffeine intake (currently drinking 3 cups of 1/2-caff coffee per day); a few ectopic beats auscultated when checking his BP manually but none on exam; patient also awaiting cardiology consultation for direction re: next steps

## 2023-11-28 NOTE — PROGRESS NOTES
[facility]  [facility] Val[facility]  Telemedicine Visit      Mode of Visit: Video  Location of patient: home  You have chosen to receive care through a telehealth visit.  Does the patient consent to use a video/audio connection for your medical care today? Yes  The visit included audio and video interaction. No technical issues occurred during this visit.     Chief Complaint  Follow-up and Syncope    History of Present Illness    Yoseph Granados Jr. is a 68 y.o. male with hyperlipidemia, hypertension, strong family history of premature CAD who presents today as a telemedicine follow-up for syncope.    Patient was in [place] for a wedding and had a syncopal event on 10/1.  He reports he drank 1 beer while at the wedding.  The following morning after loading luggage into his car he sat down the  seat and had a brief syncopal episode.  His wife was in the passenger seat and she believes he was out for 10-15 seconds. Says he was slumped over and snoring. He woke up and quickly returned to his baseline mental status.  He did have urine incontinence.  He did eat breakfast that morning and says he was hydrated.  He had taken multiple trips to the car without exertional symptoms.  Denies any associated chest pain, shortness of breath or palpitations.  EKG and labs stable.  CT head normal.  He denies any symptoms prior but did feel dizzy afterwards. He says he did have some dizziness and posterior headaches that day.     He had a normal MRI of the brain, carotid artery duplex, echo and nuclear stress test. MCOT showed one episode of AF that lasted less than one minute. No other significant arrhythmias.     He reports most SBPs are 130-140. In the past mostly <130, but he feels it is elevated because he has been more sedentary since his back surgery and his syncopal event.  History of white coat HTN but has never been on medications since home SBPs usually 120s. Denies any further syncope or near  "syncope. Occasional dizziness but recently has improved. Denies any palpitations except for sometimes at night he can hear his heart beat.     Wife reports that he does snore at times      Cardiac risks: HLD, age, gender, family hx (mother had CABG in late 60s as well as congenital heart defect, father had first MI at age 50, valve replacement, aneurysm and pacemaker, brother had MI and SCD 39)       Objective     Vital Signs:   Vitals:    11/29/23 0814   BP: 138/77   Pulse: 77   Weight: 84.4 kg (186 lb)   Height: 172.7 cm (68\")     Body mass index is 28.28 kg/m².    Virtual Visit Physical Exam  Physical Exam         Result Review  Data Reviewed:{ Labs  Result Review  Imaging  Med Tab  Media :23}   STRESS TEST - [facility] - [facility] (11/02/2023)   Adult Transthoracic Echo Complete W/ Cont if Necessary Per Protocol (10/20/2023 15:11)  Duplex Carotid Ultrasound CAR (10/20/2023 15:11)  MRI Brain With & Without Contrast (10/19/2023 20:21)          Assessment and Plan {CC Problem List  Visit Diagnosis  ROS  Review (Popup)  Health Maintenance  Quality  BestPractice  Medications  SmartSets  SnapShot Encounters  Media :23}   1. Syncope, unspecified syncope type  - 30 day monitor, stress test, echo, MRI brain, carotid artery duplex all unremarkable. Suspect vasovagal event  - Continue to increase hydration    2. Hypertension  - Not on meds in the past. Would go ahead and start amlodipine 5mg daily.Continue home monitoring and lifestyle changes. If SBPs start running low, may be able to stop    3. Hyperlipidemia LDL goal <100  - The 10-year ASCVD risk score (Kimmie CARTAGENA, et al., 2019) is: 25.3%    Values used to calculate the score:      Age: 68 years      Sex: Male      Is Non- : No      Diabetic: No      Tobacco smoker: No      Systolic Blood Pressure: 138 mmHg      Is BP treated: Yes      HDL Cholesterol: 41 mg/dL      Total Cholesterol: " 197 mg/dL  - Recommend starting statin therapy and achieving LDL goal of <100, preferably less than 70 due to family hx. Start atorvastatin 40mg daily and recheck labs in 6-8 weeks    4. Paroxysmal atrial fibrillation  - 1 episode that was <1 minute long. I am not sure he needs the eliquis but since ChadsVasc score is 2 and he was having multiple neurological symptoms will continue until his consult with cardiology and let them decide  - We discussed varying monitoring methods at home including [employer] Mobile and Apple watch      5. Sleep disturbance    - Ambulatory Referral to Sleep Medicine            I spent 35 minutes caring for Yoseph on this date of service. This time includes time spent by me in the following activities:preparing for the visit, reviewing tests, obtaining and/or reviewing a separately obtained history, counseling and educating the patient/family/caregiver, ordering medications, tests, or procedures, and documenting information in the medical record    Follow Up {Instructions Charge Capture  Follow-up Communications :23}   No follow-ups on file.    Patient was given instructions and counseling regarding his condition or for health maintenance advice. Please see specific information pulled into the AVS if appropriate.  Advised to call the Heart and Valve Center with any questions, concerns, or worsening symptoms.    Dictated Utilizing Dragon Dictation

## 2023-11-29 ENCOUNTER — TELEMEDICINE (OUTPATIENT)
Dept: CARDIOLOGY | Facility: HOSPITAL | Age: 68
End: 2023-11-29
Payer: MEDICARE

## 2023-11-29 VITALS
WEIGHT: 186 LBS | SYSTOLIC BLOOD PRESSURE: 138 MMHG | HEIGHT: 68 IN | HEART RATE: 77 BPM | BODY MASS INDEX: 28.19 KG/M2 | DIASTOLIC BLOOD PRESSURE: 77 MMHG

## 2023-11-29 DIAGNOSIS — E78.5 HYPERLIPIDEMIA LDL GOAL <100: ICD-10-CM

## 2023-11-29 DIAGNOSIS — R55 SYNCOPE, UNSPECIFIED SYNCOPE TYPE: ICD-10-CM

## 2023-11-29 DIAGNOSIS — I10 ESSENTIAL HYPERTENSION: ICD-10-CM

## 2023-11-29 DIAGNOSIS — G47.9 SLEEP DISTURBANCE: Primary | ICD-10-CM

## 2023-11-29 DIAGNOSIS — I48.0 PAROXYSMAL ATRIAL FIBRILLATION: ICD-10-CM

## 2023-11-29 RX ORDER — ATORVASTATIN CALCIUM 20 MG/1
20 TABLET, FILM COATED ORAL DAILY
Qty: 30 TABLET | Refills: 3 | Status: SHIPPED | OUTPATIENT
Start: 2023-11-29

## 2023-11-29 RX ORDER — AMLODIPINE BESYLATE 5 MG/1
5 TABLET ORAL DAILY
Qty: 30 TABLET | Refills: 3 | Status: SHIPPED | OUTPATIENT
Start: 2023-11-29

## 2023-12-12 ENCOUNTER — HOSPITAL ENCOUNTER (OUTPATIENT)
Dept: NEUROLOGY | Facility: HOSPITAL | Age: 68
Discharge: HOME | End: 2023-12-12
Admitting: INTERNAL MEDICINE
Payer: MEDICARE

## 2023-12-12 ENCOUNTER — APPOINTMENT (OUTPATIENT)
Dept: CARDIOLOGY | Facility: HOSPITAL | Age: 68
End: 2023-12-12
Payer: MEDICARE

## 2023-12-12 ENCOUNTER — TELEPHONE (OUTPATIENT)
Dept: INTERNAL MEDICINE | Facility: CLINIC | Age: 68
End: 2023-12-12
Payer: MEDICARE

## 2023-12-12 DIAGNOSIS — R55 SYNCOPE, UNSPECIFIED SYNCOPE TYPE: ICD-10-CM

## 2023-12-12 PROCEDURE — 95816 EEG AWAKE AND DROWSY: CPT

## 2023-12-12 PROCEDURE — 95816 EEG AWAKE AND DROWSY: CPT | Performed by: PSYCHIATRY & NEUROLOGY

## 2023-12-12 NOTE — TELEPHONE ENCOUNTER
----- Message from Gina Adamson MD sent at 12/12/2023 12:33 PM EST -----  The brain test (EEG) is normal - no seizure activity seen.  As long as ok with neurology, ok to resume driving

## 2023-12-21 ENCOUNTER — OFFICE VISIT (OUTPATIENT)
Dept: CARDIOLOGY | Facility: CLINIC | Age: 68
End: 2023-12-21
Payer: MEDICARE

## 2023-12-21 VITALS
HEART RATE: 77 BPM | BODY MASS INDEX: 19.97 KG/M2 | WEIGHT: 131.8 LBS | OXYGEN SATURATION: 98 % | HEIGHT: 68 IN | SYSTOLIC BLOOD PRESSURE: 128 MMHG | DIASTOLIC BLOOD PRESSURE: 70 MMHG

## 2023-12-21 DIAGNOSIS — I10 ESSENTIAL HYPERTENSION: Chronic | ICD-10-CM

## 2023-12-21 DIAGNOSIS — I48.0 PAROXYSMAL ATRIAL FIBRILLATION: ICD-10-CM

## 2023-12-21 DIAGNOSIS — R55 SYNCOPE AND COLLAPSE: Primary | ICD-10-CM

## 2023-12-21 RX ORDER — BISOPROLOL FUMARATE 5 MG/1
2.5 TABLET, FILM COATED ORAL DAILY
Qty: 45 TABLET | Refills: 3 | Status: SHIPPED | OUTPATIENT
Start: 2023-12-21

## 2023-12-21 NOTE — PROGRESS NOTES
[facility] Card[facility] New Patient Visit      Date: 2023  Patient Name: Yoseph Granados Jr.  : 1955   MRN: [id number]     PCP: Gina Adamson MD   Referring Provider: Laury Duarte A*     Chief Complaint:    Chief Complaint   Patient presents with    Shortness of Breath       History of Present Illness  Yoseph Granados Jr. is a 68 y.o. male  referred here by AJ Jaquezto establish cardiac care.  Patient was initially seen by the heart valve clinic after a syncopal episode.  Patient had this episode on 10/1/2023 where after loading his car on a trip he sat down and had a syncopal episode for 10 to 15 seconds with urinary incontinence.  This was witnessed by his wife.  Patient had extensive testing prior to arriving to our office all which were largely normal except patient had a brief episode of atrial fibrillation on his cardiac monitor.  Patient does have a brother who had an MI and sudden cardiac death at the age of 39.  Patient states since wearing his Holter monitor he has had no episodes of palpitations, presyncope or syncope.  He had 1 event of brief atrial fibrillation with the cardiac monitor however it was while he was giving blood at his primary care provider's office.  He is starting to increase his exercise activity.  He used to bike several miles a day and now is biking about 25 to 30 minutes a day with no difficulty.  Patient denies any chest pain, shortness of breath, lower extremity edema.  He is having no ill effects from starting the Eliquis.    Problem List   CARDIAC  Coronary Artery Disease:   GXT 2015: Normal  Stress test 10/25/2023: No myocardial perfusion defect    Myocardium:   Echo 10/20/2023: EF 56%, negative bubble    Valvular:   Aortic calcification    Electrical:   MCOT 10/2023: Less than 1% A-fib, symptomatic with both sinus rhythm and with PVCs    Percardium:   Normal    VASCULAR  Cerebrovascular disease:   Carotid duplex  10/20/2023: Less than 50% stenosis, mild plaque bilaterally      CARDIAC RISK FACTORS:  Hypertension  Dyslipidemia  Family History      NON-CARDIAC:  Spinal stenosis with neurogenic claudication  Osteoarthritis    SURGERIES:  Bunionectomy  Lumbar laminectomy and discectomy  Amputated index finger          Subjective     ROS   Systems reviewed and pertinent positives and negatives noted in the HPI.    Medications:     Current Outpatient Medications:     amLODIPine (NORVASC) 5 MG tablet, Take 1 tablet by mouth Daily., Disp: 30 tablet, Rfl: 3    apixaban (ELIQUIS) 5 MG tablet tablet, Take 1 tablet by mouth 2 (Two) Times a Day., Disp: 60 tablet, Rfl: 3    atorvastatin (LIPITOR) 20 MG tablet, Take 1 tablet by mouth Daily., Disp: 30 tablet, Rfl: 3    carboxymethylcellulose (REFRESH PLUS) 0.5 % solution, 3 (Three) Times a Day As Needed for Dry Eyes., Disp: , Rfl:     cetirizine (zyrTEC) 10 MG tablet, Take 1 tablet by mouth Daily., Disp: , Rfl:     HE PO, Take  by mouth., Disp: , Rfl:     Cholecalciferol (VITAMIN D) 2000 UNITS capsule, Take 1 capsule by mouth Daily., Disp: , Rfl:     Cinnamon 500 MG tablet, Take 1 tablet by mouth daily., Disp: , Rfl:     ELDERBERRY PO, Take  by mouth., Disp: , Rfl:     fluticasone (FLONASE) 50 MCG/ACT nasal spray, 2 sprays into the nostril(s) as directed by provider Daily., Disp: 16 g, Rfl: 5    Multiple Vitamins-Minerals (CENTRUM SILVER ADULT 50+ PO), Take 1 tablet by mouth Daily., Disp: , Rfl:     Probiotic Product (PROBIOTIC-10 PO), Take  by mouth., Disp: , Rfl:     triamcinolone (KENALOG) 0.025 % cream, , Disp: , Rfl:     bisoprolol (ZEBeta) 5 MG tablet, Take 0.5 tablets by mouth Daily., Disp: 45 tablet, Rfl: 3     Allergies:   Allergies   Allergen Reactions    Cosamin Ds [Glucosamine-Chondroitin] Hives    Erythromycin Hives    Glucosamine-Chondroitin Hives    Naproxen Hives    Sulfa Antibiotics Hives    Sulfamethoxazole Hives    Trimethoprim Hives         The following portions of  "the patient's history were reviewed and updated as appropriate: allergies, current medications, past family history, past medical history, past social history, past surgical history and problem list.    Objective     Vitals:    12/21/23 1016   BP: 128/70   BP Location: Right arm   Patient Position: Sitting   Pulse: 77   SpO2: 98%   Weight: 59.8 kg (131 lb 12.8 oz)   Height: 172.7 cm (68\")      Body mass index is 20.04 kg/m².     Physical Exam   Constitutional: Well-developed, well-nourished, no acute distress  HENT: Normocephalic, atraumatic moist oral mucosa  Neck: Neck supple. No JVD present. No carotid bruits.   Cardiovascular: Regular rate, regular rhythm and normal heart sounds. No murmur heard.   Pulmonary/Chest: Clear to auscultation bilaterally without wheezing, rhonchi, or rails  Abdominal: Nondistended  Musculoskeletal: No obvious deformity  Neurological: Alert and oriented x3, no focal deficits  Extremities: No peripheral edema, palpable DP pulses bilaterally    Labs:  Lab Results   Component Value Date    GLUCOSE 96 11/09/2023    BUN 13 11/09/2023    CREATININE 1.02 11/09/2023    EGFRIFNONA 84 11/03/2021    EGFRIFAFRI 97 11/03/2021    BCR 12.7 11/09/2023    K 4.5 11/09/2023    CO2 27.2 11/09/2023    CALCIUM 9.6 11/09/2023    PROTENTOTREF 6.4 11/09/2023    ALBUMIN 4.8 11/09/2023    LABIL2 3.0 11/09/2023    AST 21 11/09/2023    ALT 23 11/09/2023     Lab Results   Component Value Date    WBC 7.08 11/09/2023    HGB 15.9 11/09/2023    HCT 46.9 11/09/2023    MCV 90.2 11/09/2023     11/09/2023     Lab Results   Component Value Date    CHOL 179 01/30/2018    TRIG 155 (H) 11/09/2023    HDL 41 11/09/2023     (H) 11/09/2023     Lab Results   Component Value Date    TSH 3.540 11/09/2023     Lab Results   Component Value Date    HGBA1C 5.70 (H) 11/09/2023         Smoking Cessation:   Patient does not smoke    Advance Care Planning   ACP discussion was declined by the patient. Patient does not have an " advance directive, declines further assistance.        Assessment & Plan    Assessment / Plan    Assessment:  1. Syncope and collapse    2. Hypertension    3. Paroxysmal atrial fibrillation          Plan:  Patient had a brief episode of atrial fibrillation less than 1 minute long while giving blood.  Will continue Eliquis 5 mg twice daily for now.  Patient states that he will likely get a smart watch to kind of monitor his heart rate.  In the meantime we will start the patient on bisoprolol 2.5 mg daily.  Continue Eliquis 5 mg twice daily for stroke prophylaxis.  Continue Lipitor 20 mg daily and will have his cholesterol rechecked in the future.  Will continue Norvasc 5 mg daily for blood pressure control.          Follow Up:   Return in about 1 year (around 12/21/2024) for at [place].    Armando Rojas MD

## 2023-12-23 PROBLEM — I48.0 PAROXYSMAL ATRIAL FIBRILLATION: Chronic | Status: ACTIVE | Noted: 2023-11-15

## 2024-01-18 ENCOUNTER — LAB (OUTPATIENT)
Dept: LAB | Facility: HOSPITAL | Age: 69
End: 2024-01-18
Payer: MEDICARE

## 2024-01-18 DIAGNOSIS — E78.5 HYPERLIPIDEMIA LDL GOAL <100: ICD-10-CM

## 2024-01-18 LAB
ALBUMIN SERPL-MCNC: 4.9 G/DL (ref 3.5–5.2)
ALBUMIN/GLOB SERPL: 2.3 G/DL
ALP SERPL-CCNC: 84 U/L (ref 39–117)
ALT SERPL W P-5'-P-CCNC: 24 U/L (ref 1–41)
ANION GAP SERPL CALCULATED.3IONS-SCNC: 12.1 MMOL/L (ref 5–15)
AST SERPL-CCNC: 23 U/L (ref 1–40)
BILIRUB SERPL-MCNC: 0.7 MG/DL (ref 0–1.2)
BUN SERPL-MCNC: 15 MG/DL (ref 8–23)
BUN/CREAT SERPL: 14.7 (ref 7–25)
CALCIUM SPEC-SCNC: 9.3 MG/DL (ref 8.6–10.5)
CHLORIDE SERPL-SCNC: 103 MMOL/L (ref 98–107)
CHOLEST SERPL-MCNC: 140 MG/DL (ref 0–200)
CO2 SERPL-SCNC: 26.9 MMOL/L (ref 22–29)
CREAT SERPL-MCNC: 1.02 MG/DL (ref 0.76–1.27)
EGFRCR SERPLBLD CKD-EPI 2021: 79.1 ML/MIN/1.73
GLOBULIN UR ELPH-MCNC: 2.1 GM/DL
GLUCOSE SERPL-MCNC: 103 MG/DL (ref 65–99)
HDLC SERPL-MCNC: 45 MG/DL (ref 40–60)
LDLC SERPL CALC-MCNC: 71 MG/DL (ref 0–100)
LDLC/HDLC SERPL: 1.51 {RATIO}
POTASSIUM SERPL-SCNC: 4.2 MMOL/L (ref 3.5–5.2)
PROT SERPL-MCNC: 7 G/DL (ref 6–8.5)
SODIUM SERPL-SCNC: 142 MMOL/L (ref 136–145)
TRIGL SERPL-MCNC: 135 MG/DL (ref 0–150)
VLDLC SERPL-MCNC: 24 MG/DL (ref 5–40)

## 2024-01-18 PROCEDURE — 80061 LIPID PANEL: CPT

## 2024-01-18 PROCEDURE — 36415 COLL VENOUS BLD VENIPUNCTURE: CPT

## 2024-01-18 PROCEDURE — 80053 COMPREHEN METABOLIC PANEL: CPT

## 2024-01-18 NOTE — PROGRESS NOTES
Your labs are within acceptable limits and your cholesterol has significantly improved.  I would continue current medications

## 2024-02-07 ENCOUNTER — OFFICE VISIT (OUTPATIENT)
Dept: SLEEP MEDICINE | Facility: HOSPITAL | Age: 69
End: 2024-02-07
Payer: MEDICARE

## 2024-02-07 VITALS
BODY MASS INDEX: 29.1 KG/M2 | DIASTOLIC BLOOD PRESSURE: 65 MMHG | HEART RATE: 62 BPM | HEIGHT: 68 IN | SYSTOLIC BLOOD PRESSURE: 135 MMHG | OXYGEN SATURATION: 96 % | WEIGHT: 192 LBS

## 2024-02-07 DIAGNOSIS — G47.33 OBSTRUCTIVE SLEEP APNEA, ADULT: Primary | ICD-10-CM

## 2024-02-07 DIAGNOSIS — G47.19 EXCESSIVE DAYTIME SLEEPINESS: ICD-10-CM

## 2024-02-07 DIAGNOSIS — R06.83 SNORING: ICD-10-CM

## 2024-02-07 NOTE — PROGRESS NOTES
Yoseph Granados Jr. is a 68 y.o. male.   Chief Complaint   Patient presents with    Sleeping Problem       HPI     68 y.o. male seen in consultation at the request of Laury Duarte A* for evaluation of the above.     He has a past medical history significant for hypertension, dyslipidemia, and atrial fibrillation.  He has had several episodes of syncope.  He has been evaluated by cardiology and neurology evaluation is pending.  During the course of his cardiac evaluation it was discovered that he snored and he was referred here for further evaluation.    He has a longstanding history of snoring.  This been present for more than 5 years.  Interestingly, his wife says that his snoring actually seems less prominent than it was this summer.    He typically is in early sleeper and early riser.  He is in bed at 830 and up at 4 AM on a nightly basis.  Takes him 5 or 10 minutes to initiate sleep and then he awakens once or twice throughout the night but does not have prolonged awakenings.  He will nap for 30 minutes on some days during the day.    He is typically energetic in the morning but in the afternoon he notes somnolence.    He denies any RLS type symptoms.  He does awaken with a dry mouth.  He has no morning headaches.  He has no significant parasomnias per his wife.    [place] Scale is: 5/24    The patient's relevant past medical, surgical, family, and social history reviewed and updated in Epic as appropriate.    Current medications are:   Current Outpatient Medications:     amLODIPine (NORVASC) 5 MG tablet, Take 1 tablet by mouth Daily., Disp: 30 tablet, Rfl: 3    apixaban (ELIQUIS) 5 MG tablet tablet, Take 1 tablet by mouth 2 (Two) Times a Day., Disp: 60 tablet, Rfl: 3    atorvastatin (LIPITOR) 20 MG tablet, Take 1 tablet by mouth Daily., Disp: 30 tablet, Rfl: 3    bisoprolol (ZEBeta) 5 MG tablet, Take 0.5 tablets by mouth Daily., Disp: 45 tablet, Rfl: 3    carboxymethylcellulose (REFRESH PLUS) 0.5 %  "solution, 3 (Three) Times a Day As Needed for Dry Eyes., Disp: , Rfl:     cetirizine (zyrTEC) 10 MG tablet, Take 1 tablet by mouth Daily., Disp: , Rfl:     HE PO, Take  by mouth., Disp: , Rfl:     Cholecalciferol (VITAMIN D) 2000 UNITS capsule, Take 1 capsule by mouth Daily., Disp: , Rfl:     Cinnamon 500 MG tablet, Take 1 tablet by mouth daily., Disp: , Rfl:     ELDERBERRY PO, Take  by mouth., Disp: , Rfl:     fluticasone (FLONASE) 50 MCG/ACT nasal spray, 2 sprays into the nostril(s) as directed by provider Daily., Disp: 16 g, Rfl: 5    Multiple Vitamins-Minerals (CENTRUM SILVER ADULT 50+ PO), Take 1 tablet by mouth Daily., Disp: , Rfl:     Probiotic Product (PROBIOTIC-10 PO), Take  by mouth., Disp: , Rfl:     triamcinolone (KENALOG) 0.025 % cream, , Disp: , Rfl: .    Review of Systems    Review of Systems  ROS documented in patient questionnaire ×14 systems.  Reviewed with patient.  Otherwise negative except as noted in HPI.    Physical Exam    Blood pressure 135/65, pulse 62, height 172.7 cm (68\"), weight 87.1 kg (192 lb), SpO2 96%. Body mass index is 29.19 kg/m².    Physical Exam  Vitals and nursing note reviewed.   Constitutional:       Appearance: Normal appearance. He is well-developed.   HENT:      Head: Normocephalic and atraumatic.      Nose: Nose normal.      Mouth/Throat:      Mouth: Mucous membranes are moist.      Pharynx: Oropharynx is clear. No oropharyngeal exudate.      Comments: Class IV airway  Eyes:      General: No scleral icterus.     Conjunctiva/sclera: Conjunctivae normal.   Neck:      Thyroid: No thyromegaly.      Trachea: No tracheal deviation.   Cardiovascular:      Rate and Rhythm: Normal rate and regular rhythm.      Heart sounds: No murmur heard.     No friction rub. No gallop.   Pulmonary:      Effort: Pulmonary effort is normal. No respiratory distress.      Breath sounds: No wheezing or rales.   Musculoskeletal:         General: No deformity. Normal range of motion.   Skin:     " General: Skin is warm and dry.      Findings: No rash.   Neurological:      Mental Status: He is alert and oriented to person, place, and time.   Psychiatric:         Behavior: Behavior normal.         Thought Content: Thought content normal.         DATA:    Reviewed 11/29/2023 note from ANAID Bowie    Reviewed 11/10/2023 labs including CBC and CMP    Reviewed bed partner questionnaire    ASSESSMENT:    Problem List Items Addressed This Visit    None  Visit Diagnoses       Obstructive sleep apnea, adult    -  Primary    Relevant Orders    Home Sleep Study    Snoring        Relevant Orders    Home Sleep Study    Excessive daytime sleepiness        Relevant Orders    Home Sleep Study            68-year-old male who presents to the sleep clinic with snoring primarily.  His wife notes possible apneas.  He does have some somnolence in the afternoons.  He has comorbidities including hypertension and atrial fibrillation.  He has class IV airway.  Based upon all of this his risk for obstructive sleep apnea is increased and he warrants further evaluation.  A home sleep apnea test would be acceptable as an initial study.    I went over the diagnostic potential treatment process with him and he was agreeable to proceed as detailed below    PLAN:    Home sleep apnea testing.  Diagnostic process and potential treatment options discussed and questions/concerns addressed.  Long-term cardiovascular and metabolic risks of untreated obstructive sleep apnea reviewed with the patient.  He was agreeable to a trial of CPAP therapy on an initial trial basis if recommended after testing complete.  Sleep center follow-up.      I have reviewed the results of my evaluation and impression and discussed my recommendations in detail with the patient.    Signed by  Matias Shepherd MD    February 7, 2024      CC: Gina Adamson MD Musto, ANKUSH Rain

## 2024-02-26 ENCOUNTER — HOSPITAL ENCOUNTER (OUTPATIENT)
Dept: SLEEP MEDICINE | Facility: HOSPITAL | Age: 69
Discharge: HOME | End: 2024-02-26
Admitting: INTERNAL MEDICINE
Payer: MEDICARE

## 2024-02-26 VITALS — BODY MASS INDEX: 29.1 KG/M2 | HEIGHT: 68 IN | WEIGHT: 192 LBS

## 2024-02-26 DIAGNOSIS — R06.83 SNORING: ICD-10-CM

## 2024-02-26 DIAGNOSIS — G47.33 OBSTRUCTIVE SLEEP APNEA, ADULT: ICD-10-CM

## 2024-02-26 DIAGNOSIS — G47.19 EXCESSIVE DAYTIME SLEEPINESS: ICD-10-CM

## 2024-02-26 PROCEDURE — 95800 SLP STDY UNATTENDED: CPT

## 2024-02-27 RX ORDER — ATORVASTATIN CALCIUM 20 MG/1
20 TABLET, FILM COATED ORAL DAILY
Qty: 90 TABLET | Refills: 1 | Status: SHIPPED | OUTPATIENT
Start: 2024-02-27

## 2024-02-27 RX ORDER — AMLODIPINE BESYLATE 5 MG/1
5 TABLET ORAL DAILY
Qty: 90 TABLET | Refills: 1 | Status: SHIPPED | OUTPATIENT
Start: 2024-02-27

## 2024-02-28 DIAGNOSIS — G47.33 OSA (OBSTRUCTIVE SLEEP APNEA): Primary | ICD-10-CM

## 2024-03-01 NOTE — TELEPHONE ENCOUNTER
Last seen 11/29/23, please review and refill if appropriate.    Cleve Kinney, PharmD, BCPS  3/1/2024  08:11 EST

## 2024-03-04 ENCOUNTER — TELEPHONE (OUTPATIENT)
Dept: SLEEP MEDICINE | Facility: CLINIC | Age: 69
End: 2024-03-04
Payer: MEDICARE

## 2024-03-04 ENCOUNTER — TELEPHONE (OUTPATIENT)
Dept: SLEEP MEDICINE | Facility: HOSPITAL | Age: 69
End: 2024-03-04
Payer: MEDICARE

## 2024-03-04 NOTE — TELEPHONE ENCOUNTER
PT AGREEABLE TO PAP THERAPY, ORDER HAS BEEN SENT TO [facility] IN [place] 03/04/24 AB

## 2024-03-04 NOTE — TELEPHONE ENCOUNTER
PATIENT RETURNED CALL AFTER REVIEWING STUDY RESULTS.    PATIENT IS AGREEABLE TO BEGIN PAP THERAPY AND WOULD LIKE TO USE [facility] IN [place].    PATIENT HAS BEEN SCHEDULED FOR COMPLIANCE F/U APPT.

## 2024-03-05 RX ORDER — APIXABAN 5 MG/1
5 TABLET, FILM COATED ORAL 2 TIMES DAILY
Qty: 180 TABLET | Refills: 3 | Status: SHIPPED | OUTPATIENT
Start: 2024-03-05

## 2024-03-14 ENCOUNTER — OFFICE VISIT (OUTPATIENT)
Dept: NEUROLOGY | Facility: CLINIC | Age: 69
End: 2024-03-14
Payer: MEDICARE

## 2024-03-14 VITALS
SYSTOLIC BLOOD PRESSURE: 152 MMHG | DIASTOLIC BLOOD PRESSURE: 72 MMHG | WEIGHT: 187.2 LBS | HEART RATE: 58 BPM | BODY MASS INDEX: 28.37 KG/M2 | HEIGHT: 68 IN | OXYGEN SATURATION: 97 %

## 2024-03-14 DIAGNOSIS — R55 SYNCOPE, UNSPECIFIED SYNCOPE TYPE: Primary | ICD-10-CM

## 2024-03-14 PROCEDURE — 1159F MED LIST DOCD IN RCRD: CPT | Performed by: NURSE PRACTITIONER

## 2024-03-14 PROCEDURE — 3078F DIAST BP <80 MM HG: CPT | Performed by: NURSE PRACTITIONER

## 2024-03-14 PROCEDURE — 99214 OFFICE O/P EST MOD 30 MIN: CPT | Performed by: NURSE PRACTITIONER

## 2024-03-14 PROCEDURE — 1160F RVW MEDS BY RX/DR IN RCRD: CPT | Performed by: NURSE PRACTITIONER

## 2024-03-14 PROCEDURE — 3077F SYST BP >= 140 MM HG: CPT | Performed by: NURSE PRACTITIONER

## 2024-03-14 NOTE — PROGRESS NOTES
Neuro Office Visit      Encounter Date: 2024   Patient Name: Yoseph Granados Jr.  : 1955   MRN: [id number]   PCP:  Gina Adamson MD     Chief Complaint:    Chief Complaint   Patient presents with    Syncope and Collapse       History of Present Illness: Yoseph Granados Jr. is a 69 y.o. male who is here today in Neurology for syncope.    2 syncopal episodes.  The first being while he was home alone.  He is unsure what happened but feels that he might have been dehydrated.  Unsure how long he was unconscious.    2023 he was out of town at a family wedding and got into the car with his wife.  His wife noted that his hands were shaking as he was trying to put the car into gear.  Suddenly his head slumped forward onto the steering wheel and he was unresponsive and snoring.  His wife did note urinary incontinence at that time.  He was unresponsive for approximately 45 seconds to 1 minute and when he regained consciousness was mildly confused for about 1 minute.  She did note that during the episode he was snoring loudly.    Following that second episode he was able to program the GPS for their hotel and his wife drove.    He has had a thorough workup done from cardiology which has ruled out cardiac cause of syncope.    He has increased his water intake to 64 ounces a day.    He is now on appropriate blood pressure medicines and his blood pressure has been much improved.    While his heart was being monitored he was noted to have a brief episode of atrial fibrillation and was started on Eliquis as well as a statin.    EEG was normal.    MRI of the brain was reviewed with patient and his wife.  He has some mild chronic vessel disease changes but no concerning normalities.      MRI Brain With & Without Contrast (10/19/2023 20:21)     EEG (2023 08:40)     Subjective      Past Medical History:   Past Medical History:   Diagnosis Date    Atrial fibrillation 23    One event while  being monitored.    Closed comminuted fracture of 2nd toe, intra-articular 07/24/2017    Podiatry - Dr. Velásquez (5/31/17): postop shoe, RTC 4 wks    Eyelid excess skin 04/23/2018 4/18/18 ophtho/Dr. Aragon - dermatochalasis bilaterally, return for upper lid eval for bleph    Finger amputation, traumatic 1991    h/o L.index finger amp seconary to bleacher injury, s/p failed surgery    Foot pain 07/31/2016    Impression: 06/05/2015 - cont with cream per Dr. Davidson; patient currently not interested in surgery  Impression: 04/20/2015 - ongoing eval per Dr. Davidson; exercise modification as above; surgery would be last resort  Impression: 01/20/2015 - rec f/u with Dr. Davidson; Description: secondary to 2nd MTP capsulitis, related to hammertoe (4/15), s/p injection (2/16); podiatry - Dr. Davidson    Hx of cardiovascular stress test 10/26/2023    nuclear stress test (10/26/23)    Hx of cardiovascular stress test 10/25/2023    nl nuclear stress test (10/25/23): EF 55%; cards - Dr. Hopkins    Hx of carotid ultrasound 10/20/2023    carotid u/s (10/20/23): < 50 % stenoses bilaterally, (+) plaque    Hx of chest x-ray 02/28/2018    CXR (2/28/18): lenticular opacities at bases sugg of atelectasis    Hx of chest x-ray 09/16/2019    CXR (9/16/19): chronic changes of atelectasia dn scarring at right hilar and infrahilar region as well as prior healed    Hx of colonoscopy 06/04/2012    colonosc (6/4/12): polyp, diverticulosis, int hem; GI - Dr. George    Hx of colonoscopy 07/24/2017    colonosc (7/24/17): polyp; GI - Dr. Geroge    Hx of CT scan of head 10/01/2023    nl CT head (10/1/23, [facility]), sinuses clear    Hx of echocardiogram 10/20/2023    ECHO (10/20/23) EF 56%, mild calcified AV with tr-mild AR, tr TR/IA, neg saline test    Hx of EEG 12/12/2023    nl EEG (12/12/23)    Hx of exercise stress test 02/11/2015    nl GXT (2/11/15)    Hx of MRI bilat hips 08/19/2022    MRI hips (8/19/22): mod arthritis changes bilat with  diffuse labral tearing; paralabral cysts L>R, no effusion, sclerotic lesions right hemipelvis, indeterminate    Hx of MRI brain 02/09/2020    brain MRI (2/9/20): Mild chronic small vessel ischemic changes, fluid in mastoid cells c/w chronic paranasal sinusitis    Hx of MRI brain 10/19/2023    MRI brain (10/19/23): perivent changes c/w chronic small vessel ischemic disease; mastoid air cells and paranasal sinuses are clear    Hx of MRI L-spine 06/14/2023    MRI L-spine (6/14/23): multilevel spinal canal and neural foraminal stenosis, severe at L4-5    Hx of MRI lumbar spine 08/19/2022    MRI L-spine (8/19/22): diffuse arthritis changes, severe canal stenosis L4-5, mod-severe left/mod right NFS L5-S1    Hypertension     Left-sided Bell's palsy 02/10/2020    2/9/20 ER visit - RX valtrex 1000mg BIDx7d and pred 40/20/10, each for 5 days; nl brain MRI except Mild chronic small vessel ischemic changes and fluid in mastoid cells c/w chronic paranasal sinusitis    Right hip pain 01/31/2017 9/11/1R. Sciatic, now with proximal hamstring tendinitis, cont hamstring stretches, yoga, and walking exercise; f/u prn flare-up; maki Trevino; 7/7/17 no longer with sciatic sxs, agree with yoga and aquatic exercises, consider MRI L-spine if no better, maki Trevino; 6/1/17 R. Hip pain due to mild degenerative changes by xray, rx PT at [facility] and rtc 6 wks, maki Trevino; 3/29/17 R    Shoulder joint pain 07/31/2016    Description: chronic tendonitis of the shoulders; ortho -    Sleep apnea 2/29/24    Syncope 10/1/23    Second time. First was a couple years prior.    Trochanteric bursitis of right hip 02/08/2017    s/p injection (2/8/17), resolved; ortho Nick Trevino       Past Surgical History:   Past Surgical History:   Procedure Laterality Date    AMPUTATION DIGIT Left 1991     History of Amputated Index Finger DIP Joint(s) secondary to injury at bleachers; s/p failed surgery x2     BUNIONECTOMY  10/2015    s/p Hudson  bunionectomy, 2nd metatarsal osteotomy shortening, arthrodesis (10/15); podiatry - Dr. Davidson    COLONOSCOPY  2017    You already have this information    KNEE SURGERY Left     LAMINECTOMY      LUMBAR LAMINECTOMY DISCECTOMY DECOMPRESSION Left 2023    Procedure: MIS Laminectomy left-sided approach L4-5 with Bilateral Laminoforaminotomy L4-5;  Surgeon: Hans Valentin MD;  Location: [facility];  Service: Neurosurgery;  Laterality: Left;    METATARSAL OSTEOTOMY      history of buniuon correction with metatarsal osteotomy  s/p Hudson bunionectomty, 2nd metatarsal osteotomy shortening, arthrodesis (10/15);  podiatry - Dr. Davidson       Family History:   Family History   Problem Relation Age of Onset    Heart attack Mother     Diabetes Mother     Heart disease Mother     Hypertension Mother     Hypertension Father     Heart disease Father     Heart attack Father     Aneurysm Father         AAA    Coronary artery disease Father         CABG    Heart failure Father     Arrhythmia Father         pacemaker    Skin cancer Father     Valvular heart disease Father     Fibromyalgia Father     Hyperlipidemia Sister     Hyperlipidemia Sister     Diabetes Sister     Stroke Sister     Anxiety disorder Sister     Hypertension Brother     Hypertension Brother     Hyperlipidemia Brother     Heart attack Brother     Arrhythmia Brother         pacemaker    Heart attack Cousin         pat cousin  age 44       Social History:   Social History     Socioeconomic History    Marital status:     Number of children: 3   Tobacco Use    Smoking status: Never     Passive exposure: Past    Smokeless tobacco: Never    Tobacco comments:     Dad heavy smoker   Vaping Use    Vaping status: Never Used   Substance and Sexual Activity    Alcohol use: Yes     Alcohol/week: 1.0 standard drink of alcohol     Comment: Maybe once a month    Drug use: No    Sexual activity: Never       Medications:     Current Outpatient Medications:      amLODIPine (NORVASC) 5 MG tablet, TAKE 1 TABLET BY MOUTH DAILY, Disp: 90 tablet, Rfl: 1    apixaban (Eliquis) 5 MG tablet tablet, TAKE 1 TABLET BY MOUTH TWICE A DAY, Disp: 180 tablet, Rfl: 3    atorvastatin (LIPITOR) 20 MG tablet, TAKE 1 TABLET BY MOUTH DAILY, Disp: 90 tablet, Rfl: 1    bisoprolol (ZEBeta) 5 MG tablet, Take 0.5 tablets by mouth Daily., Disp: 45 tablet, Rfl: 3    carboxymethylcellulose (REFRESH PLUS) 0.5 % solution, 3 (Three) Times a Day As Needed for Dry Eyes., Disp: , Rfl:     cetirizine (zyrTEC) 10 MG tablet, Take 1 tablet by mouth Daily., Disp: , Rfl:     HE PO, Take  by mouth., Disp: , Rfl:     Cholecalciferol (VITAMIN D) 2000 UNITS capsule, Take 1 capsule by mouth Daily., Disp: , Rfl:     Cinnamon 500 MG tablet, Take 1 tablet by mouth daily., Disp: , Rfl:     ELDERBERRY PO, Take  by mouth., Disp: , Rfl:     fluticasone (FLONASE) 50 MCG/ACT nasal spray, 2 sprays into the nostril(s) as directed by provider Daily., Disp: 16 g, Rfl: 5    Multiple Vitamins-Minerals (CENTRUM SILVER ADULT 50+ PO), Take 1 tablet by mouth Daily., Disp: , Rfl:     Probiotic Product (PROBIOTIC-10 PO), Take  by mouth., Disp: , Rfl:     triamcinolone (KENALOG) 0.025 % cream, , Disp: , Rfl:     Allergies:   Allergies   Allergen Reactions    Cosamin Ds [Glucosamine-Chondroitin] Hives    Erythromycin Hives    Glucosamine-Chondroitin Hives    Naproxen Hives    Sulfa Antibiotics Hives    Sulfamethoxazole Hives    Trimethoprim Hives       PHQ-9 Total Score:     STEADI Fall Risk Assessment was completed, and patient is at LOW risk for falls.Assessment completed on:3/14/2024    Objective     Physical Exam:   Physical Exam  Vitals reviewed.   Eyes:      Extraocular Movements: EOM normal.      Pupils: Pupils are equal, round, and reactive to light.   Neurological:      Mental Status: He is oriented to person, place, and time.      Coordination: Finger-Nose-Finger Test, Heel to Shin Test and Romberg Test normal.      Gait: Gait is  intact. Tandem walk normal.      Deep Tendon Reflexes:      Reflex Scores:       Tricep reflexes are 2+ on the right side and 2+ on the left side.       Bicep reflexes are 2+ on the right side and 2+ on the left side.       Brachioradialis reflexes are 2+ on the right side and 2+ on the left side.       Patellar reflexes are 2+ on the right side and 2+ on the left side.       Achilles reflexes are 2+ on the right side and 2+ on the left side.  Psychiatric:         Speech: Speech normal.         Neurologic Exam     Mental Status   Oriented to person, place, and time.   Attention: normal. Concentration: normal.   Speech: speech is normal   Level of consciousness: alert  Knowledge: good.     Cranial Nerves     CN II   Visual fields full to confrontation.     CN III, IV, VI   Pupils are equal, round, and reactive to light.  Extraocular motions are normal.   CN III: no CN III palsy  CN VI: no CN VI palsy    CN V   Facial sensation intact.     CN VII   Facial expression full, symmetric.     CN VIII   CN VIII normal.     CN IX, X   CN IX normal.   CN X normal.     CN XI   CN XI normal.     CN XII   CN XII normal.     Motor Exam     Strength   Right neck flexion: 5/5  Left neck flexion: 5/5  Right neck extension: 5/5  Left neck extension: 5/5  Right deltoid: 5/5  Left deltoid: 5/5  Right biceps: 5/5  Left biceps: 5/5  Right triceps: 5/5  Left triceps: 5/5  Right wrist flexion: 5/5  Left wrist flexion: 5/5  Right wrist extension: 5/5  Left wrist extension: 5/5  Right interossei: 5/5  Left interossei: 5/5  Right abdominals: 5/5  Left abdominals: 5/5  Right iliopsoas: 5/5  Left iliopsoas: 5/5  Right quadriceps: 5/5  Left quadriceps: 5/5  Right hamstrin/5  Left hamstrin/5  Right glutei: 5/5  Left glutei: 5/5  Right anterior tibial: 5/5  Left anterior tibial: 5/5  Right posterior tibial: 5/5  Left posterior tibial: 5/5  Right peroneal: 5/5  Left peroneal: 5/5  Right gastroc: 5/5  Left gastroc: 5/5    Sensory Exam   Light  "touch normal.     Gait, Coordination, and Reflexes     Gait  Gait: normal    Coordination   Romberg: negative  Finger to nose coordination: normal  Heel to shin coordination: normal  Tandem walking coordination: normal    Tremor   Resting tremor: absent  Intention tremor: absent  Action tremor: absent    Reflexes   Right brachioradialis: 2+  Left brachioradialis: 2+  Right biceps: 2+  Left biceps: 2+  Right triceps: 2+  Left triceps: 2+  Right patellar: 2+  Left patellar: 2+  Right achilles: 2+  Left achilles: 2+  Right : 2+  Left : 2+       Vital Signs:   Vitals:    03/14/24 1036   BP: 152/72   Pulse: 58   SpO2: 97%   Weight: 84.9 kg (187 lb 3.2 oz)   Height: 172.7 cm (68\")     Body mass index is 28.46 kg/m².     Results:   Imaging:   EEG    Result Date: 12/12/2023  Normal study This report is transcribed using the Dragon dictation system.         Labs:    No results found for: \"CMP\", \"PROTEIN\", \"ANTIMOGAB\", \"CANJKM0TAYZ\", \"JCVRESULT\", \"QUANTTBGOLD\", \"CBCDIF\", \"IGGALBSER\"     Assessment / Plan      Assessment/Plan:   Diagnoses and all orders for this visit:    1. Syncope, unspecified syncope type (Primary)           Patient Education:   At this point there is no clear neurologic etiology makes these appear like a seizure.  He has had no further episodes of syncope.  I discussed with Yoseph and his wife that if he has another episode we should probably consider starting him on an antiepileptic.  Reviewed medications, potential side effects and signs and symptoms to report. Discussed risk versus benefits of treatment plan with patient and/or family-including medications, labs and radiology that may be ordered. Addressed questions and concerns during visit. Patient and/or family verbalized understanding and agree with plan. Instructed to call the office with any questions and report to ER with any life-threatening symptoms.     Follow Up:   Return in about 6 months (around 9/14/2024).    I spent 30  minutes face " to face with the patient and family. I personally spent 50 percent of this time counseling and discussing diagnosis, diagnostic testing, driving, treatment options, and management .       During this visit the following were done:  Labs Reviewed [x]    Labs Ordered []    Radiology Reports Reviewed [x]    Radiology Ordered [x]    PCP Records Reviewed [x]    Referring Provider Records Reviewed [x]    ER Records Reviewed []    Hospital Records Reviewed []    History Obtained From Family []    Radiology Images Reviewed [x]    Other Reviewed [x]    Records Requested []      ANAID Curtis  [facility] NE[facility]  [address]  [facility] [address] [phone]

## 2024-03-18 NOTE — PROGRESS NOTES
Follow-up Visit      Date: 2024  Patient Name: Yoseph Granados Jr.  : 1955   MRN: [id number]     Chief Complaint: Patients watch is showing him going in and out of afib,      History of Present Illness: Yoseph Granados Jr. is a 69 y.o. male who is here today for evaluation of paroxysmal atrial fibrillation.  Patient recently was diagnosed with sleep apnea and has been using his mask and adjusting to it.  He noticed yesterday on his watch that his heart rate has been in atrial fibrillation and lasted for few minutes.  He went back to normal sinus rhythm.  He felt a little dizzy.  Sometimes he feels a little tension in the back of his head.  He denies any shortness of breath any chest pain or any other symptoms.  On his previous evaluation he does have 1% burden of atrial fibrillation.      Problem List     CARDIAC  Coronary Artery Disease:   GXT : Normal  Stress test 10/25/2023: No myocardial perfusion defect    Myocardium:   Echo 10/20/2023: EF 56%, negative bubble    Valvular:   Aortic calcification    Electrical:   MCOT 10/2023: Less than 1% A-fib, symptomatic with both sinus rhythm and with PVCs    Percardium:   Normal    VASCULAR  Cerebrovascular disease:   Carotid duplex 10/20/2023: Less than 50% stenosis, mild plaque bilaterally    CARDIAC RISK FACTORS:  Hypertension  Dyslipidemia  2024   HDL 45 LDL 71  Family History   Sleep Apnea    NON-CARDIAC:  Spinal stenosis with neurogenic claudication  Osteoarthritis    SURGERIES:  Bunionectomy  Lumbar laminectomy and discectomy  Amputated index finger      Subjective      Review of Systems:   Review of Systems   Respiratory: Negative.     Cardiovascular:  Positive for palpitations.       Medications:     Current Outpatient Medications:     amLODIPine (NORVASC) 5 MG tablet, TAKE 1 TABLET BY MOUTH DAILY, Disp: 90 tablet, Rfl: 1    apixaban (Eliquis) 5 MG tablet tablet, TAKE 1 TABLET BY MOUTH TWICE A DAY, Disp: 180 tablet, Rfl:  "3    atorvastatin (LIPITOR) 20 MG tablet, TAKE 1 TABLET BY MOUTH DAILY, Disp: 90 tablet, Rfl: 1    bisoprolol (ZEBeta) 5 MG tablet, Take 0.5 tablets by mouth Daily., Disp: 45 tablet, Rfl: 3    carboxymethylcellulose (REFRESH PLUS) 0.5 % solution, 3 (Three) Times a Day As Needed for Dry Eyes., Disp: , Rfl:     cetirizine (zyrTEC) 10 MG tablet, Take 1 tablet by mouth Daily., Disp: , Rfl:     HE PO, Take  by mouth., Disp: , Rfl:     Cholecalciferol (VITAMIN D) 2000 UNITS capsule, Take 1 capsule by mouth Daily., Disp: , Rfl:     Cinnamon 500 MG tablet, Take 1 tablet by mouth daily., Disp: , Rfl:     ELDERBERRY PO, Take  by mouth., Disp: , Rfl:     fluticasone (FLONASE) 50 MCG/ACT nasal spray, 2 sprays into the nostril(s) as directed by provider Daily., Disp: 16 g, Rfl: 5    Multiple Vitamins-Minerals (CENTRUM SILVER ADULT 50+ PO), Take 1 tablet by mouth Daily., Disp: , Rfl:     Probiotic Product (PROBIOTIC-10 PO), Take  by mouth., Disp: , Rfl:     triamcinolone (KENALOG) 0.025 % cream, , Disp: , Rfl:     Allergies:   Allergies   Allergen Reactions    Cosamin Ds [Glucosamine-Chondroitin] Hives    Erythromycin Hives    Glucosamine-Chondroitin Hives    Naproxen Hives    Sulfa Antibiotics Hives    Sulfamethoxazole Hives    Trimethoprim Hives       Objective     Physical Exam:  Vitals:    03/19/24 1037   BP: 128/72   BP Location: Right arm   Patient Position: Sitting   Cuff Size: Adult   Pulse: 59   SpO2: 99%   Weight: 83.9 kg (185 lb)   Height: 172.7 cm (68\")     Body mass index is 28.13 kg/m².      Constitutional:       General: Not in acute distress.     Appearance: Healthy appearance. Not in distress.     Neck:     JVP: Not elevated     Carotid artery: No carotid bruit    Pulmonary:      Effort: Pulmonary effort is normal.      Breath sounds: Normal breath sounds. No wheezing. No rhonchi. No rales.     Cardiovascular:      Regular rate.  Regular rhythm. Normal S1. Normal S2.      Murmurs: There is no significant " murmur.      No gallop. No click. No rub.     Abdominal:      General: Bowel sounds are normal.      Palpations: Abdomen is soft.      Tenderness: There is no abdominal tenderness.    Extremities:     Pulses: Good pulses     Edema: No edema    Smoking Cessation:   Tobacco Product History : Patient never smoked    Lab Review:   Lab Results   Component Value Date    GLUCOSE 103 (H) 01/18/2024    BUN 15 01/18/2024    CREATININE 1.02 01/18/2024    EGFRIFNONA 84 11/03/2021    EGFRIFAFRI 97 11/03/2021    BCR 14.7 01/18/2024    K 4.2 01/18/2024    CO2 26.9 01/18/2024    CALCIUM 9.3 01/18/2024    PROTENTOTREF 6.4 11/09/2023    ALBUMIN 4.9 01/18/2024    LABIL2 3.0 11/09/2023    AST 23 01/18/2024    ALT 24 01/18/2024     Lab Results   Component Value Date    WBC 7.08 11/09/2023    HGB 15.9 11/09/2023    HCT 46.9 11/09/2023    MCV 90.2 11/09/2023     11/09/2023     Lab Results   Component Value Date    TSH 3.540 11/09/2023           ECG 12 Lead    Date/Time: 3/19/2024 10:58 AM  Performed by: Armando Rojas MD    Authorized by: Armando Rojas MD  Comparison: compared with previous ECG from 10/10/2023  Similar to previous ECG  Rhythm: sinus bradycardia  Conduction: right bundle branch block           Assessment / Plan      Assessment:   Diagnosis Plan   1. Paroxysmal atrial fibrillation        2. Hyperlipidemia LDL goal <100        3. Hypertension             Plan:  Patient has a history of paroxysmal atrial fibrillation.  Patient episodes have settled down a lot more with bisoprolol.  I have advised him to take an extra pill if his atrial fibrillation episode lasted more than 10 to 15 minutes.  He is going to continue with his current medication for his cholesterol management.  He will continue with his blood pressure medications.      Follow Up:       Return in about 1 year (around 3/19/2025).    Armando Rojas MD

## 2024-03-19 ENCOUNTER — OFFICE VISIT (OUTPATIENT)
Dept: CARDIOLOGY | Facility: CLINIC | Age: 69
End: 2024-03-19
Payer: MEDICARE

## 2024-03-19 VITALS
BODY MASS INDEX: 28.04 KG/M2 | OXYGEN SATURATION: 99 % | HEART RATE: 59 BPM | DIASTOLIC BLOOD PRESSURE: 72 MMHG | WEIGHT: 185 LBS | SYSTOLIC BLOOD PRESSURE: 128 MMHG | HEIGHT: 68 IN

## 2024-03-19 DIAGNOSIS — I48.0 PAROXYSMAL ATRIAL FIBRILLATION: Primary | ICD-10-CM

## 2024-03-19 DIAGNOSIS — E78.5 HYPERLIPIDEMIA LDL GOAL <100: ICD-10-CM

## 2024-03-19 DIAGNOSIS — I10 ESSENTIAL HYPERTENSION: ICD-10-CM

## 2024-03-19 PROCEDURE — 93000 ELECTROCARDIOGRAM COMPLETE: CPT | Performed by: INTERNAL MEDICINE

## 2024-03-19 PROCEDURE — 3078F DIAST BP <80 MM HG: CPT | Performed by: INTERNAL MEDICINE

## 2024-03-19 PROCEDURE — 99214 OFFICE O/P EST MOD 30 MIN: CPT | Performed by: INTERNAL MEDICINE

## 2024-03-19 PROCEDURE — 3074F SYST BP LT 130 MM HG: CPT | Performed by: INTERNAL MEDICINE

## 2024-03-21 ENCOUNTER — TELEPHONE (OUTPATIENT)
Dept: CARDIOLOGY | Facility: CLINIC | Age: 69
End: 2024-03-21
Payer: MEDICARE

## 2024-03-26 RX ORDER — ATORVASTATIN CALCIUM 20 MG/1
20 TABLET, FILM COATED ORAL DAILY
Qty: 90 TABLET | Refills: 3 | Status: SHIPPED | OUTPATIENT
Start: 2024-03-26

## 2024-03-26 RX ORDER — AMLODIPINE BESYLATE 5 MG/1
5 TABLET ORAL DAILY
Qty: 90 TABLET | Refills: 3 | Status: SHIPPED | OUTPATIENT
Start: 2024-03-26

## 2024-04-22 DIAGNOSIS — M51.16 LUMBAR DISC HERNIATION WITH RADICULOPATHY: ICD-10-CM

## 2024-04-22 DIAGNOSIS — M48.062 SPINAL STENOSIS OF LUMBAR REGION WITH NEUROGENIC CLAUDICATION: Primary | Chronic | ICD-10-CM

## 2024-05-09 ENCOUNTER — HOSPITAL ENCOUNTER (OUTPATIENT)
Dept: GENERAL RADIOLOGY | Facility: HOSPITAL | Age: 69
Discharge: HOME | End: 2024-05-09
Admitting: PHYSICIAN ASSISTANT
Payer: MEDICARE

## 2024-05-09 DIAGNOSIS — M51.16 LUMBAR DISC HERNIATION WITH RADICULOPATHY: ICD-10-CM

## 2024-05-09 DIAGNOSIS — M48.062 SPINAL STENOSIS OF LUMBAR REGION WITH NEUROGENIC CLAUDICATION: Chronic | ICD-10-CM

## 2024-05-09 PROCEDURE — 72120 X-RAY BEND ONLY L-S SPINE: CPT

## 2024-05-09 NOTE — PROGRESS NOTES
Sleep Clinic Follow Up Note    Chief Complaint  Follow-up and Sleep Apnea    Subjective     History of Present Illness (from previous encounter on 2/7/2024 with Dr. Shepherd):  He has a past medical history significant for hypertension, dyslipidemia, and atrial fibrillation.  He has had several episodes of syncope.  He has been evaluated by cardiology and neurology evaluation is pending.  During the course of his cardiac evaluation it was discovered that he snored and he was referred here for further evaluation.     He has a longstanding history of snoring.  This been present for more than 5 years.  Interestingly, his wife says that his snoring actually seems less prominent than it was this summer.     He typically is in early sleeper and early riser.  He is in bed at 830 and up at 4 AM on a nightly basis.  Takes him 5 or 10 minutes to initiate sleep and then he awakens once or twice throughout the night but does not have prolonged awakenings.  He will nap for 30 minutes on some days during the day.     He is typically energetic in the morning but in the afternoon he notes somnolence.     He denies any RLS type symptoms.  He does awaken with a dry mouth.  He has no morning headaches.  He has no significant parasomnias per his wife.     [place] Varina Scale is: 5/24 (End copied text).    -A home sleep test was obtained on 2/27/2024 revealing moderate obstructive sleep apnea with an AHI of 20.4/h.    Interval History:  Yoseph Granados Jr. is a 69 y.o. male returns for follow up and compliance of PAP therapy. The patient was last seen on 2/7/2024 by Dr. Shepherd. Overall the patient feels good with regard to therapy. He has episodes of tachypnea. The device appears to be working appropriately. On average the patient sleeps 6-7 hours per night. The patient wakes 1-2 times per night.     The patient reports the following changes to their medical and medication history since they were last seen:  Cataract surgery        Further details are as follows:      [place] Scale is (out of 24): Total score: 4     Weight:  Current Weight: 190 lb    The patient's relevant past medical, surgical, family, and social history reviewed and updated in Epic as appropriate.    PMH:    Past Medical History:   Diagnosis Date    Abnormal ECG Nov 23    AFIB-RVR    Arthritis Around five years ago    Asthma     Atrial fibrillation 11/29/23    One event while being monitored.    Closed comminuted fracture of 2nd toe, intra-articular 07/24/2017    Podiatry - Dr. Velásquez (5/31/17): postop shoe, RTC 4 wks    Eyelid excess skin 04/23/2018 4/18/18 ophtho/Dr. Aragon - dermatochalasis bilaterally, return for upper lid eval for bleph    Finger amputation, traumatic 1991    h/o L.index finger amp seconary to bleacher injury, s/p failed surgery    Foot pain 07/31/2016    Impression: 06/05/2015 - cont with cream per Dr. Davidson; patient currently not interested in surgery  Impression: 04/20/2015 - ongoing eval per Dr. Davidson; exercise modification as above; surgery would be last resort  Impression: 01/20/2015 - rec f/u with Dr. Davidson; Description: secondary to 2nd MTP capsulitis, related to hammertoe (4/15), s/p injection (2/16); podiatry - Dr. Davidson    Hx of cardiovascular stress test 10/26/2023    nuclear stress test (10/26/23)    Hx of cardiovascular stress test 10/25/2023    nl nuclear stress test (10/25/23): EF 55%; cards - Dr. Hopkins    Hx of carotid ultrasound 10/20/2023    carotid u/s (10/20/23): < 50 % stenoses bilaterally, (+) plaque    Hx of chest x-ray 02/28/2018    CXR (2/28/18): lenticular opacities at bases sugg of atelectasis    Hx of chest x-ray 09/16/2019    CXR (9/16/19): chronic changes of atelectasia dn scarring at right hilar and infrahilar region as well as prior healed    Hx of colonoscopy 06/04/2012    colonosc (6/4/12): polyp, diverticulosis, int hem; GI - Dr. George    Hx of colonoscopy 07/24/2017    colonosc (7/24/17): polyp; GI  - Dr. George    Hx of CT scan of head 10/01/2023    nl CT head (10/1/23, [facility]), sinuses clear    Hx of echocardiogram 10/20/2023    ECHO (10/20/23) EF 56%, mild calcified AV with tr-mild AR, tr TR/IL, neg saline test    Hx of EEG 12/12/2023    nl EEG (12/12/23)    Hx of exercise stress test 02/11/2015    nl GXT (2/11/15)    Hx of MRI bilat hips 08/19/2022    MRI hips (8/19/22): mod arthritis changes bilat with diffuse labral tearing; paralabral cysts L>R, no effusion, sclerotic lesions right hemipelvis, indeterminate    Hx of MRI brain 02/09/2020    brain MRI (2/9/20): Mild chronic small vessel ischemic changes, fluid in mastoid cells c/w chronic paranasal sinusitis    Hx of MRI brain 10/19/2023    MRI brain (10/19/23): perivent changes c/w chronic small vessel ischemic disease; mastoid air cells and paranasal sinuses are clear    Hx of MRI L-spine 06/14/2023    MRI L-spine (6/14/23): multilevel spinal canal and neural foraminal stenosis, severe at L4-5    Hx of MRI lumbar spine 08/19/2022    MRI L-spine (8/19/22): diffuse arthritis changes, severe canal stenosis L4-5, mod-severe left/mod right NFS L5-S1    Hypertension     Left-sided Bell's palsy 02/10/2020    2/9/20 ER visit - RX valtrex 1000mg BIDx7d and pred 40/20/10, each for 5 days; nl brain MRI except Mild chronic small vessel ischemic changes and fluid in mastoid cells c/w chronic paranasal sinusitis    Right hip pain 01/31/2017    9/11/1R. Sciatic, now with proximal hamstring tendinitis, cont hamstring stretches, yoga, and walking exercise; f/u prn flare-up; ortho - Dr. Trevino; 7/7/17 no longer with sciatic sxs, agree with yoga and aquatic exercises, consider MRI L-spine if no better, ortho - Dr. Trevino; 6/1/17 R. Hip pain due to mild degenerative changes by xray, rx PT at [facility] and rtc 6 wks, ortho - Dr. Trevino; 3/29/17 R    Shoulder joint pain 07/31/2016    Description: chronic tendonitis of the shoulders; ortho -    Sleep apnea 2/29/24     Syncope 10/1/23    Second time. First was a couple years prior.    Trochanteric bursitis of right hip 02/08/2017    s/p injection (2/8/17), resolved; ortho - Dr. Trevino     Past Surgical History:   Procedure Laterality Date    AMPUTATION DIGIT Left 1991     History of Amputated Index Finger DIP Joint(s) secondary to injury at bleachers; s/p failed surgery x2     BACK SURGERY      BUNIONECTOMY  10/2015    s/p Hudson bunionectomy, 2nd metatarsal osteotomy shortening, arthrodesis (10/15); podiatry - Dr. Davidson    COLONOSCOPY  2017    You already have this information    KNEE SURGERY Left 1985    LAMINECTOMY      LUMBAR LAMINECTOMY DISCECTOMY DECOMPRESSION Left 07/27/2023    Procedure: MIS Laminectomy left-sided approach L4-5 with Bilateral Laminoforaminotomy L4-5;  Surgeon: Hans Valentin MD;  [facility]: [facility];  Service: Neurosurgery;  Laterality: Left;    METATARSAL OSTEOTOMY      history of buniuon correction with metatarsal osteotomy  s/p Hudson bunionectomty, 2nd metatarsal osteotomy shortening, arthrodesis (10/15);  podiatry - Dr. Davidson       Allergies   Allergen Reactions    Cosamin Ds [Glucosamine-Chondroitin] Hives    Erythromycin Hives    Glucosamine-Chondroitin Hives    Naproxen Hives    Sulfa Antibiotics Hives    Sulfamethoxazole Hives    Trimethoprim Hives       MEDS:  Prior to Admission medications    Medication Sig Start Date End Date Taking? Authorizing Provider   amLODIPine (NORVASC) 5 MG tablet Take 1 tablet by mouth Daily. 3/26/24   Armando Rojas MD   apixaban (Eliquis) 5 MG tablet tablet TAKE 1 TABLET BY MOUTH TWICE A DAY 3/5/24   Armando Rojas MD   atorvastatin (LIPITOR) 20 MG tablet Take 1 tablet by mouth Daily. 3/26/24   Armando Rojas MD   bisoprolol (ZEBeta) 5 MG tablet Take 0.5 tablets by mouth Daily. 12/21/23   Zoya Gayle PA-C   carboxymethylcellulose (REFRESH PLUS) 0.5 % solution 3 (Three) Times a Day As Needed for Dry Eyes.    Provider, MD Ian   cetirizine (zyrTEC)  "10 MG tablet Take 1 tablet by mouth Daily.    ProviderIan MD   HE PO Take  by mouth.    Ian Vargas MD   Cholecalciferol (VITAMIN D) 2000 UNITS capsule Take 1 capsule by mouth Daily. 13      Cinnamon 500 MG tablet Take 1 tablet by mouth daily. 4/20/15      ELDERBERRY PO Take  by mouth.    Ian Vargas MD   fluticasone (FLONASE) 50 MCG/ACT nasal spray 2 sprays into the nostril(s) as directed by provider Daily. 21   Gina Adamson MD   Multiple Vitamins-Minerals (CENTRUM SILVER ADULT 50+ PO) Take 1 tablet by mouth Daily. 13      Probiotic Product (PROBIOTIC-10 PO) Take  by mouth.    ProviderIan MD   triamcinolone (KENALOG) 0.025 % cream  23   ProviderIan MD         FH:  Family History   Problem Relation Age of Onset    Heart attack Mother     Diabetes Mother     Heart disease Mother     Hypertension Mother     Hypertension Father     Heart disease Father     Heart attack Father     Aneurysm Father         AAA    Coronary artery disease Father         CABG    Heart failure Father     Arrhythmia Father         pacemaker    Skin cancer Father     Valvular heart disease Father     Fibromyalgia Father     Hyperlipidemia Sister     Hyperlipidemia Sister     Diabetes Sister     Stroke Sister     Anxiety disorder Sister     Hypertension Brother     Hyperlipidemia Brother     Hypertension Brother     Hyperlipidemia Brother     Heart attack Brother     Arrhythmia Brother         pacemaker    Heart attack Cousin         pat cousin  age 44    Heart disease Paternal Grandmother        Objective   Vital Signs:  /74   Pulse 59   Temp 98.3 °F (36.8 °C)   Ht 172.7 cm (68\")   Wt 86.5 kg (190 lb 9.6 oz)   SpO2 98%   BMI 28.98 kg/m²              Physical Exam  Vitals reviewed.   Constitutional:       Appearance: Normal appearance.   HENT:      Head: Normocephalic and atraumatic.      Nose: Nose normal.      Mouth/Throat:      Mouth: Mucous membranes are " moist.   Cardiovascular:      Rate and Rhythm: Normal rate and regular rhythm.      Heart sounds: No murmur heard.     No friction rub. No gallop.   Pulmonary:      Effort: Pulmonary effort is normal. No respiratory distress.      Breath sounds: Normal breath sounds. No wheezing or rhonchi.   Neurological:      Mental Status: He is alert and oriented to person, place, and time.   Psychiatric:         Behavior: Behavior normal.               Result Review :           PAP Report:  AHI: 5.6/h  Days of Usage: 53/66 (80%)  Number of Days Greater than 4 hours: 49/66 (74%)  Average time (days used): 6 hours 30 minutes  95th Percentile Pressure: 1.9 cmH2O  95th percentile leaks: 3.0 L/min  Settings: Auto CPAP-8/18 cm H2O, EPR full-time, EPR level 1, response standard.       Assessment and Plan  Yoseph Granados Jr. is a 69 y.o. male who returns for follow-up and compliance of PAP therapy.  The Pap report has been reviewed.  Overall usage is at 80% with compliance at 74%.  Patient averages 6 hours and 30 minutes of therapy.  Sleep apnea is controlled with an AHI of 5.6/H. He has a history of moderate obstructive sleep apnea with an AHI of 20.4/h. I will refill the patient's supplies, and I have asked them to return for follow-up and compliance in 1 year or sooner should they have further questions or concerns.     Diagnoses and all orders for this visit:    1. EJ (obstructive sleep apnea) (Primary)  -     PAP Therapy    2. Paroxysmal atrial fibrillation    3. Overweight (BMI 25.0-29.9)         The patient continues to use and benefit from PAP therapy.    1. The patient was counseled regarding multimodal approach with healthy nutrition, healthy sleep, regular physical activity, social activities, counseling, and medications. Encouraged to practice lateral sleep position. Avoid alcohol and sedatives close to bedtime.     2.  We will refill supplies x1 year.  Return to clinic 1 year or sooner if symptoms warrant. I have  reviewed the results of my evaluation and impression and discussed my recommendations in detail with the patient.           Follow Up  Return in about 1 year (around 5/14/2025) for Annual visit.  Patient was given instructions and counseling regarding his condition or for health maintenance advice. Please see specific information pulled into the AVS if appropriate.       ANAID Guzman, ACNP-BC  Pulmonology, Critical Care, and Sleep Medicine

## 2024-05-13 ENCOUNTER — OFFICE VISIT (OUTPATIENT)
Dept: NEUROSURGERY | Facility: CLINIC | Age: 69
End: 2024-05-13
Payer: MEDICARE

## 2024-05-13 VITALS
DIASTOLIC BLOOD PRESSURE: 70 MMHG | HEIGHT: 68 IN | WEIGHT: 190 LBS | SYSTOLIC BLOOD PRESSURE: 130 MMHG | TEMPERATURE: 98 F | BODY MASS INDEX: 28.79 KG/M2

## 2024-05-13 DIAGNOSIS — M48.062 SPINAL STENOSIS OF LUMBAR REGION WITH NEUROGENIC CLAUDICATION: Primary | ICD-10-CM

## 2024-05-13 PROCEDURE — 3075F SYST BP GE 130 - 139MM HG: CPT | Performed by: PHYSICIAN ASSISTANT

## 2024-05-13 PROCEDURE — 99214 OFFICE O/P EST MOD 30 MIN: CPT | Performed by: PHYSICIAN ASSISTANT

## 2024-05-13 PROCEDURE — 3078F DIAST BP <80 MM HG: CPT | Performed by: PHYSICIAN ASSISTANT

## 2024-05-13 RX ORDER — PREDNISOLONE ACETATE 10 MG/ML
SUSPENSION/ DROPS OPHTHALMIC
COMMUNITY
Start: 2024-04-14

## 2024-05-13 RX ORDER — KETOROLAC TROMETHAMINE 4 MG/ML
1 SOLUTION/ DROPS OPHTHALMIC
COMMUNITY
Start: 2024-04-10

## 2024-05-13 RX ORDER — OFLOXACIN 3 MG/ML
SOLUTION/ DROPS OPHTHALMIC
COMMUNITY
Start: 2024-04-10 | End: 2024-05-17

## 2024-05-13 NOTE — PROGRESS NOTES
Patient: Yoseph Granados Jr.  : 1955    Primary Care Provider: Gina Adamson MD      Chief Complaint: Low back right leg pain    History of Present Illness:       Patient is a very nice 69-year-old gentleman who is very active and had a history of a lumbar laminectomy and discectomy at the left-sided L4-5 area.  This was done microscopically.  Patient had a great outcome with his initial surgery but has had increased activity and pain since February of this year.  Patient has been overactive and will have good days and bad days with his pain.  Patient's pain is intermittent but whenever it occurs it seems to happen whenever he is standing and walking and it runs down the L4-5 area on the right.    Patient has not had any new scans has not done any physical therapy at this point.  Patient has been some of the exercises we gave him prior and after his previous surgery.    Patient denies any cauda equina or any other signs or symptoms of progressive myelopathy  Review of Systems   Constitutional:  Negative for activity change, appetite change, chills, diaphoresis, fatigue, fever and unexpected weight change.   HENT:  Negative for congestion, dental problem, drooling, ear discharge, ear pain, facial swelling, hearing loss, mouth sores, nosebleeds, postnasal drip, rhinorrhea, sinus pressure, sinus pain, sneezing, sore throat, tinnitus, trouble swallowing and voice change.    Eyes:  Negative for photophobia, pain, discharge, redness, itching and visual disturbance.   Respiratory:  Positive for apnea. Negative for cough, choking, chest tightness, shortness of breath, wheezing and stridor.    Cardiovascular:  Negative for chest pain, palpitations and leg swelling.   Gastrointestinal:  Negative for abdominal distention, abdominal pain, anal bleeding, blood in stool, constipation, diarrhea, nausea, rectal pain and vomiting.   Endocrine: Negative for cold intolerance, heat intolerance, polydipsia, polyphagia and  polyuria.   Genitourinary:  Negative for decreased urine volume, difficulty urinating, dysuria, enuresis, flank pain, frequency, genital sores, hematuria, penile discharge, penile pain, penile swelling, scrotal swelling, testicular pain and urgency.   Musculoskeletal:  Negative for arthralgias, back pain, gait problem, joint swelling, myalgias, neck pain and neck stiffness.   Skin:  Negative for color change, pallor, rash and wound.   Allergic/Immunologic: Negative for environmental allergies, food allergies and immunocompromised state.   Neurological:  Negative for dizziness, tremors, seizures, syncope, facial asymmetry, speech difficulty, weakness, light-headedness, numbness and headaches.   Hematological:  Negative for adenopathy. Does not bruise/bleed easily.   Psychiatric/Behavioral:  Negative for agitation, behavioral problems, confusion, decreased concentration, dysphoric mood, hallucinations, self-injury, sleep disturbance and suicidal ideas. The patient is not nervous/anxious and is not hyperactive.        Past Medical History:     Past Medical History:   Diagnosis Date    Abnormal ECG Nov 23    AFIB-RVR    Arthritis Around five years ago    Asthma     Atrial fibrillation 11/29/23    One event while being monitored.    Closed comminuted fracture of 2nd toe, intra-articular 07/24/2017    Podiatry - Dr. Velásquez (5/31/17): postop shoe, RTC 4 wks    Eyelid excess skin 04/23/2018 4/18/18 ophtho/Dr. Aragon - dermatochalasis bilaterally, return for upper lid eval for bleph    Finger amputation, traumatic 1991    h/o L.index finger amp seconary to bleacher injury, s/p failed surgery    Foot pain 07/31/2016    Impression: 06/05/2015 - cont with cream per Dr. Davidson; patient currently not interested in surgery  Impression: 04/20/2015 - ongoing eval per Dr. Davidson; exercise modification as above; surgery would be last resort  Impression: 01/20/2015 - rec f/u with Dr. Davidson; Description: secondary to 2nd MTP  capsulitis, related to hammertoe (4/15), s/p injection (2/16); podiatry - Dr. Davidson    Hx of cardiovascular stress test 10/26/2023    nuclear stress test (10/26/23)    Hx of cardiovascular stress test 10/25/2023    nl nuclear stress test (10/25/23): EF 55%; cards - Dr. Hopkins    Hx of carotid ultrasound 10/20/2023    carotid u/s (10/20/23): < 50 % stenoses bilaterally, (+) plaque    Hx of chest x-ray 02/28/2018    CXR (2/28/18): lenticular opacities at bases sugg of atelectasis    Hx of chest x-ray 09/16/2019    CXR (9/16/19): chronic changes of atelectasia dn scarring at right hilar and infrahilar region as well as prior healed    Hx of colonoscopy 06/04/2012    colonosc (6/4/12): polyp, diverticulosis, int hem; GI - Dr. George    Hx of colonoscopy 07/24/2017    colonosc (7/24/17): polyp; GI - Dr. George    Hx of CT scan of head 10/01/2023    nl CT head (10/1/23, [facility]), sinuses clear    Hx of echocardiogram 10/20/2023    ECHO (10/20/23) EF 56%, mild calcified AV with tr-mild AR, tr TR/IN, neg saline test    Hx of EEG 12/12/2023    nl EEG (12/12/23)    Hx of exercise stress test 02/11/2015    nl GXT (2/11/15)    Hx of MRI bilat hips 08/19/2022    MRI hips (8/19/22): mod arthritis changes bilat with diffuse labral tearing; paralabral cysts L>R, no effusion, sclerotic lesions right hemipelvis, indeterminate    Hx of MRI brain 02/09/2020    brain MRI (2/9/20): Mild chronic small vessel ischemic changes, fluid in mastoid cells c/w chronic paranasal sinusitis    Hx of MRI brain 10/19/2023    MRI brain (10/19/23): perivent changes c/w chronic small vessel ischemic disease; mastoid air cells and paranasal sinuses are clear    Hx of MRI L-spine 06/14/2023    MRI L-spine (6/14/23): multilevel spinal canal and neural foraminal stenosis, severe at L4-5    Hx of MRI lumbar spine 08/19/2022    MRI L-spine (8/19/22): diffuse arthritis changes, severe canal stenosis L4-5, mod-severe left/mod right NFS L5-S1     Hypertension     Left-sided Bell's palsy 02/10/2020    2/9/20 ER visit - RX valtrex 1000mg BIDx7d and pred 40/20/10, each for 5 days; nl brain MRI except Mild chronic small vessel ischemic changes and fluid in mastoid cells c/w chronic paranasal sinusitis    Right hip pain 2017. Sciatic, now with proximal hamstring tendinitis, cont hamstring stretches, yoga, and walking exercise; f/u prn flare-up; ortho Nick Trevino; 17 no longer with sciatic sxs, agree with yoga and aquatic exercises, consider MRI L-spine if no better, maki Trevino; 17 R. Hip pain due to mild degenerative changes by xray, rx PT at [facility] and rtc 6 wks, ortho Nick Trevino; 3/29/17 R    Shoulder joint pain 2016    Description: chronic tendonitis of the shoulders; ortho -    Sleep apnea 24    Syncope 10/1/23    Second time. First was a couple years prior.    Trochanteric bursitis of right hip 2017    s/p injection (17), resolved; ortho - Dr. Trevino       Family History:     Family History   Problem Relation Age of Onset    Heart attack Mother     Diabetes Mother     Heart disease Mother     Hypertension Mother     Hypertension Father     Heart disease Father     Heart attack Father     Aneurysm Father         AAA    Coronary artery disease Father         CABG    Heart failure Father     Arrhythmia Father         pacemaker    Skin cancer Father     Valvular heart disease Father     Fibromyalgia Father     Hyperlipidemia Sister     Hyperlipidemia Sister     Diabetes Sister     Stroke Sister     Anxiety disorder Sister     Hypertension Brother     Hyperlipidemia Brother     Hypertension Brother     Hyperlipidemia Brother     Heart attack Brother     Arrhythmia Brother         pacemaker    Heart attack Cousin         pat cousin  age 44    Heart disease Paternal Grandmother        Social History:    reports that he has never smoked. He has been exposed to tobacco smoke. He has never used smokeless  "tobacco. He reports current alcohol use of about 1.0 standard drink of alcohol per week. He reports that he does not use drugs.   SMOKING STATUS: Non-smoker    Surgical History:     Past Surgical History:   Procedure Laterality Date    AMPUTATION DIGIT Left 1991     History of Amputated Index Finger DIP Joint(s) secondary to injury at bleachers; s/p failed surgery x2     BACK SURGERY      BUNIONECTOMY  10/2015    s/p [place] bunionectomy, 2nd metatarsal osteotomy shortening, arthrodesis (10/15); podiatry - Dr. Davidson    [facility]  2017    You already have this information    KNEE SURGERY Left 1985    LAMINECTOMY      LUMBAR LAMINECTOMY DISCECTOMY DECOMPRESSION Left 07/27/2023    Procedure: MIS Laminectomy left-sided approach L4-5 with Bilateral Laminoforaminotomy L4-5;  Surgeon: Hans Valentin MD;  Location: [facility];  Service: Neurosurgery;  Laterality: Left;    METATARSAL OSTEOTOMY      history of buniuon correction with metatarsal osteotomy  s/p Hudson bunionectomty, 2nd metatarsal osteotomy shortening, arthrodesis (10/15);  podiatry - Dr. Davidson       Allergies:   Cosamin ds [glucosamine-chondroitin], Erythromycin, Glucosamine-chondroitin, Naproxen, Sulfa antibiotics, Sulfamethoxazole, and Trimethoprim    Physical Exam:    Vital Signs:/70 (BP Location: Right arm, Patient Position: Sitting, Cuff Size: Adult)   Temp 98 °F (36.7 °C) (Infrared)   Ht 172.7 cm (68\")   Wt 86.2 kg (190 lb)   BMI 28.89 kg/m²    BMI: Body mass index is 28.89 kg/m².       Incision:   The incision is well-healed and well approximated.  No signs of infection, bleeding, or erythema.    Musculoskeletal:                                            Dorsiflexion is 5/5 Bilaterally                    Plantarflexion is 5/5 bilaterally                    Hip Flexion 5/5 bilaterally.                        Neurologic:                                         The patient is alert and oriented by 3.                       Sensation is " equal bilaterally with no deficit.                        Reflexes:  2+ throughout       Medical Decision Making    Data Review:   Flexion-extension x-rays do not show any instability    Diagnosis:   Low back pain  Neurogenic claudication  Right L5 radiculitis    Treatment Options:   Since last surgery patient has developed A-fib with RVR.  He is now on Eliquis.    Patient at this point is not a great candidate for injection secondary to that new diagnosis and his pain is only intermittent.  We have went over signs and symptoms that would necessitate referral back to us in a more urgent fashion but in that absence of symptoms I think that it would be in his best interest to wait this out.    If patient ever needs to be seen again we can always get an MRI of the lumbar spine with and without contrast to review this further.        Diagnosis Plan   1. Spinal stenosis of lumbar region with neurogenic claudication

## 2024-05-14 ENCOUNTER — OFFICE VISIT (OUTPATIENT)
Dept: SLEEP MEDICINE | Age: 69
End: 2024-05-14
Payer: MEDICARE

## 2024-05-14 VITALS
OXYGEN SATURATION: 98 % | TEMPERATURE: 98.3 F | BODY MASS INDEX: 28.89 KG/M2 | WEIGHT: 190.6 LBS | HEART RATE: 59 BPM | SYSTOLIC BLOOD PRESSURE: 124 MMHG | HEIGHT: 68 IN | DIASTOLIC BLOOD PRESSURE: 74 MMHG

## 2024-05-14 DIAGNOSIS — E66.3 OVERWEIGHT (BMI 25.0-29.9): ICD-10-CM

## 2024-05-14 DIAGNOSIS — G47.33 OSA (OBSTRUCTIVE SLEEP APNEA): Primary | ICD-10-CM

## 2024-05-14 DIAGNOSIS — I48.0 PAROXYSMAL ATRIAL FIBRILLATION: Chronic | ICD-10-CM

## 2024-05-14 PROCEDURE — 3074F SYST BP LT 130 MM HG: CPT | Performed by: NURSE PRACTITIONER

## 2024-05-14 PROCEDURE — 99213 OFFICE O/P EST LOW 20 MIN: CPT | Performed by: NURSE PRACTITIONER

## 2024-05-14 PROCEDURE — 1159F MED LIST DOCD IN RCRD: CPT | Performed by: NURSE PRACTITIONER

## 2024-05-14 PROCEDURE — 3078F DIAST BP <80 MM HG: CPT | Performed by: NURSE PRACTITIONER

## 2024-05-14 PROCEDURE — 1160F RVW MEDS BY RX/DR IN RCRD: CPT | Performed by: NURSE PRACTITIONER

## 2024-05-17 ENCOUNTER — OFFICE VISIT (OUTPATIENT)
Dept: INTERNAL MEDICINE | Facility: CLINIC | Age: 69
End: 2024-05-17
Payer: MEDICARE

## 2024-05-17 VITALS
HEIGHT: 68 IN | DIASTOLIC BLOOD PRESSURE: 64 MMHG | BODY MASS INDEX: 28.82 KG/M2 | TEMPERATURE: 97.4 F | OXYGEN SATURATION: 99 % | SYSTOLIC BLOOD PRESSURE: 128 MMHG | WEIGHT: 190.2 LBS | HEART RATE: 65 BPM

## 2024-05-17 DIAGNOSIS — I10 ESSENTIAL HYPERTENSION: Chronic | ICD-10-CM

## 2024-05-17 DIAGNOSIS — R73.01 IMPAIRED FASTING GLUCOSE: Primary | Chronic | ICD-10-CM

## 2024-05-17 LAB
EXPIRATION DATE: ABNORMAL
HBA1C MFR BLD: 6.1 % (ref 4.5–5.7)
Lab: ABNORMAL

## 2024-05-17 PROCEDURE — 1126F AMNT PAIN NOTED NONE PRSNT: CPT | Performed by: INTERNAL MEDICINE

## 2024-05-17 PROCEDURE — G2211 COMPLEX E/M VISIT ADD ON: HCPCS | Performed by: INTERNAL MEDICINE

## 2024-05-17 PROCEDURE — 83036 HEMOGLOBIN GLYCOSYLATED A1C: CPT | Performed by: INTERNAL MEDICINE

## 2024-05-17 PROCEDURE — 3074F SYST BP LT 130 MM HG: CPT | Performed by: INTERNAL MEDICINE

## 2024-05-17 PROCEDURE — 99214 OFFICE O/P EST MOD 30 MIN: CPT | Performed by: INTERNAL MEDICINE

## 2024-05-17 PROCEDURE — 3078F DIAST BP <80 MM HG: CPT | Performed by: INTERNAL MEDICINE

## 2024-05-17 PROCEDURE — 1160F RVW MEDS BY RX/DR IN RCRD: CPT | Performed by: INTERNAL MEDICINE

## 2024-05-17 PROCEDURE — 3044F HG A1C LEVEL LT 7.0%: CPT | Performed by: INTERNAL MEDICINE

## 2024-05-17 PROCEDURE — 1159F MED LIST DOCD IN RCRD: CPT | Performed by: INTERNAL MEDICINE

## 2024-05-17 NOTE — PROGRESS NOTES
"Chief Complaint   Patient presents with    Hypertension    Impaired fasting glucose       History of Present Illness  69 y.o.  male presents for f/u on BP and sugars. Home BPs have been 110-120s.    Reports his watch has indicated 3 episodes of afib, but patient was asx. Remains on eliquis.    Reports recent successful cataract surgery, still needs glasses for distance vision.  Still has deficit in left eye vision.    Review of Systems  Denies CP, SOB, falls. All other ROS reviewed and negative.    Current Outpatient Medications:     amLODIPine 5 MG QD    apixaban (Eliquis) 5 MG q12    atorvastatin 20 MG QD    bisoprolol (ZEBeta) 5 MG 1/2 QD    carboxymethylcellulose (REFRESH PLUS) 0.5 % solution AD    cetirizine (zyrTEC) 10 MG  QD    CHERRY QD    Cholecalciferol (VITAMIN D) 2000 UNITS QD    Cinnamon 500 MG QD    ELDERBERRY QD    fluticasone (FLONASE) 50 MCG/ACT nasal spray AD    ketorolac (ACULAR) 0.4 % solution AD     CENTRUM SILVER ADULT 50+ QD    Ocuflox 0.3 % ophth AD    prednisoLONE acetate (PRED FORTE) 1 % ophth AD    Probiotic Product QD    triamcinolone (KENALOG) 0.025 % cream AD    VITALS:  /64   Pulse 65   Temp 97.4 °F (36.3 °C)   Ht 172.7 cm (68\")   Wt 86.3 kg (190 lb 3.2 oz)   SpO2 99%   BMI 28.92 kg/m²     Physical Exam  Vitals and nursing note reviewed.   Constitutional:       General: He is not in acute distress.     Appearance: Normal appearance. He is not ill-appearing.   Eyes:      Extraocular Movements: Extraocular movements intact.      Conjunctiva/sclera: Conjunctivae normal.   Pulmonary:      Effort: Pulmonary effort is normal. No respiratory distress.   Neurological:      Mental Status: He is alert. Mental status is at baseline.   Psychiatric:         Mood and Affect: Mood normal.         Behavior: Behavior normal.         LABS  Results for orders placed or performed in visit on 05/17/24   POC Glycosylated Hemoglobin (Hb A1C)    Specimen: Blood   Result Value Ref Range    " Hemoglobin A1C 6.1 (A) 4.5 - 5.7 %    Lot Number [id number]     Expiration Date 01/03/2026 11/23 A1C 5.7    ASSESSMENT/PLAN    Diagnoses and all orders for this visit:    1. Impaired fasting glucose (Primary)  Assessment & Plan:  BG control stable with A1C 6.1; encouraged reg phys activity to decr insulin resistance, moderation in unhealthy starches/sweets; f/u A1C in 6 mos      Orders:  -     POC Glycosylated Hemoglobin (Hb A1C)    2. Hypertension  Assessment & Plan:  BP borderline elevated, improved on repeat and normotensive at home; cont amlo 5mg QD and bisoprolol 5mg 1/2 QD          FOLLOW-UP  Health maintenance - flu vacc, COVID19 vacc, and RSV vacc completed for this season; rec consideration for 2nd dose COVID19 vacc  RTC for AWV 11/18/24; fasting labs prior to appt (CBC, CMP, TSH, lipids, UA/micro, PSA, A1C, microalb)    Electronically signed by:    Gina Adamson MD, FACP  05/17/2024    EMR Dragon/Transcription Utilization  Please note that portions of this note were completed with a voice recognition program.

## 2024-05-17 NOTE — ASSESSMENT & PLAN NOTE
BP borderline elevated, improved on repeat and normotensive at home; cont amlo 5mg QD and bisoprolol 5mg 1/2 QD

## 2024-07-01 RX ORDER — BISOPROLOL FUMARATE 5 MG/1
5 TABLET, FILM COATED ORAL DAILY
Qty: 90 TABLET | Refills: 3 | Status: SHIPPED | OUTPATIENT
Start: 2024-07-01

## 2024-07-01 RX ORDER — AMLODIPINE BESYLATE 2.5 MG/1
2.5 TABLET ORAL DAILY
Qty: 90 TABLET | Refills: 3 | Status: SHIPPED | OUTPATIENT
Start: 2024-07-01

## 2024-09-11 RX ORDER — METHYLPREDNISOLONE 4 MG
TABLET, DOSE PACK ORAL
Qty: 1 EACH | Refills: 0 | Status: SHIPPED | OUTPATIENT
Start: 2024-09-11

## 2024-09-16 ENCOUNTER — OFFICE VISIT (OUTPATIENT)
Dept: NEUROLOGY | Facility: CLINIC | Age: 69
End: 2024-09-16
Payer: MEDICARE

## 2024-09-16 VITALS
OXYGEN SATURATION: 96 % | SYSTOLIC BLOOD PRESSURE: 142 MMHG | HEIGHT: 68 IN | WEIGHT: 181.6 LBS | DIASTOLIC BLOOD PRESSURE: 78 MMHG | HEART RATE: 59 BPM | BODY MASS INDEX: 27.52 KG/M2

## 2024-09-16 DIAGNOSIS — R55 SYNCOPE, UNSPECIFIED SYNCOPE TYPE: Primary | ICD-10-CM

## 2024-09-16 PROCEDURE — 3078F DIAST BP <80 MM HG: CPT | Performed by: NURSE PRACTITIONER

## 2024-09-16 PROCEDURE — 1159F MED LIST DOCD IN RCRD: CPT | Performed by: NURSE PRACTITIONER

## 2024-09-16 PROCEDURE — 1160F RVW MEDS BY RX/DR IN RCRD: CPT | Performed by: NURSE PRACTITIONER

## 2024-09-16 PROCEDURE — 3077F SYST BP >= 140 MM HG: CPT | Performed by: NURSE PRACTITIONER

## 2024-09-16 PROCEDURE — 99213 OFFICE O/P EST LOW 20 MIN: CPT | Performed by: NURSE PRACTITIONER

## 2024-10-09 PROBLEM — K64.8 INTERNAL HEMORRHOIDS: Status: ACTIVE | Noted: 2024-10-09

## 2024-10-09 PROBLEM — K57.30 DIVERTICULOSIS OF COLON: Status: ACTIVE | Noted: 2024-10-09

## 2024-10-15 ENCOUNTER — TELEPHONE (OUTPATIENT)
Dept: NEUROSURGERY | Facility: CLINIC | Age: 69
End: 2024-10-15
Payer: MEDICARE

## 2024-10-15 NOTE — TELEPHONE ENCOUNTER
I spoke to he PT and got him scheduled with [facility],I let him know that if we had any cancellations I would give him a call.He was very thankful for the call.

## 2024-10-31 ENCOUNTER — OFFICE VISIT (OUTPATIENT)
Dept: NEUROSURGERY | Facility: CLINIC | Age: 69
End: 2024-10-31
Payer: MEDICARE

## 2024-10-31 VITALS
TEMPERATURE: 98 F | HEIGHT: 68 IN | BODY MASS INDEX: 28.04 KG/M2 | WEIGHT: 185 LBS | SYSTOLIC BLOOD PRESSURE: 130 MMHG | DIASTOLIC BLOOD PRESSURE: 60 MMHG

## 2024-10-31 DIAGNOSIS — Z12.5 PROSTATE CANCER SCREENING: ICD-10-CM

## 2024-10-31 DIAGNOSIS — M48.062 SPINAL STENOSIS OF LUMBAR REGION WITH NEUROGENIC CLAUDICATION: Primary | ICD-10-CM

## 2024-10-31 DIAGNOSIS — R73.01 IMPAIRED FASTING GLUCOSE: Chronic | ICD-10-CM

## 2024-10-31 DIAGNOSIS — Z00.00 MEDICARE ANNUAL WELLNESS VISIT, SUBSEQUENT: Primary | Chronic | ICD-10-CM

## 2024-10-31 PROCEDURE — 3078F DIAST BP <80 MM HG: CPT | Performed by: PHYSICIAN ASSISTANT

## 2024-10-31 PROCEDURE — 99214 OFFICE O/P EST MOD 30 MIN: CPT | Performed by: PHYSICIAN ASSISTANT

## 2024-10-31 PROCEDURE — 3075F SYST BP GE 130 - 139MM HG: CPT | Performed by: PHYSICIAN ASSISTANT

## 2024-10-31 NOTE — PROGRESS NOTES
Patient: Yoseph Granados Jr.  : 1955    Primary Care Provider: Gina Adamson MD    Chief Complaint: Neurogenic claudication    History of Present Illness:       Patient is a nice 69-year-old gentleman known to the neurosurgical practice for undergoing a left-sided L4-5 microdiscectomy and laminectomy secondary to severe central canal stenosis.  Patient will from the surgery and did exceedingly well for almost a year.  Patient then started developing a little bit more low back pain as well as bilateral lower extremity numbness and tingling when he was walking.    We tried to manage this conservatively but ultimately he is decreased his activity taking some steroids which helped the back pain however he is still having neurogenic type symptom.Review of Systems   Constitutional:  Negative for activity change, appetite change, chills, diaphoresis, fatigue, fever and unexpected weight change.   HENT:  Negative for congestion, dental problem, drooling, ear discharge, ear pain, facial swelling, hearing loss, mouth sores, nosebleeds, postnasal drip, rhinorrhea, sinus pressure, sinus pain, sneezing, sore throat, tinnitus, trouble swallowing and voice change.    Eyes:  Negative for photophobia, pain, discharge, redness, itching and visual disturbance.   Respiratory:  Negative for apnea, cough, choking, chest tightness, shortness of breath, wheezing and stridor.    Cardiovascular:  Negative for chest pain, palpitations and leg swelling.   Gastrointestinal:  Negative for abdominal distention, abdominal pain, anal bleeding, blood in stool, constipation, diarrhea, nausea, rectal pain and vomiting.   Endocrine: Negative for cold intolerance, heat intolerance, polydipsia, polyphagia and polyuria.   Genitourinary:  Negative for decreased urine volume, difficulty urinating, dysuria, enuresis, flank pain, frequency, genital sores, hematuria, penile discharge, penile pain, penile swelling, scrotal swelling, testicular pain  and urgency.   Musculoskeletal:  Positive for back pain. Negative for arthralgias, gait problem, joint swelling, myalgias, neck pain and neck stiffness.   Skin:  Negative for color change, pallor, rash and wound.   Allergic/Immunologic: Negative for environmental allergies, food allergies and immunocompromised state.   Neurological:  Positive for weakness. Negative for dizziness, tremors, seizures, syncope, facial asymmetry, speech difficulty, light-headedness, numbness and headaches.   Hematological:  Negative for adenopathy. Does not bruise/bleed easily.   Psychiatric/Behavioral:  Negative for agitation, behavioral problems, confusion, decreased concentration, dysphoric mood, hallucinations, self-injury, sleep disturbance and suicidal ideas. The patient is not nervous/anxious and is not hyperactive.        Past Medical History:     Past Medical History:   Diagnosis Date    Arthritis Around five years ago    Asthma     Seasonal    Closed comminuted fracture of 2nd toe, intra-articular 07/24/2017    Podiatry - Dr. Velásquez (5/31/17): postop shoe, RTC 4 wks    Eyelid excess skin 04/23/2018 4/18/18 ophtho/Dr. Aragon - dermatochalasis bilaterally, return for upper lid eval for bleph    Finger amputation, traumatic 1991    h/o L.index finger amp seconary to bleacher injury, s/p failed surgery    Foot pain 07/31/2016    Impression: 06/05/2015 - cont with cream per Dr. Davidson; patient currently not interested in surgery  Impression: 04/20/2015 - ongoing eval per Dr. Davidson; exercise modification as above; surgery would be last resort  Impression: 01/20/2015 - rec f/u with Dr. Davidson; Description: secondary to 2nd MTP capsulitis, related to hammertoe (4/15), s/p injection (2/16); podiatry - Dr. Davidson    Hx of cardiovascular stress test 10/26/2023    nuclear stress test (10/26/23)    Hx of cardiovascular stress test 10/25/2023    nl nuclear stress test (10/25/23): EF 55%; cards - Dr. Hopkins    Hx of carotid ultrasound  10/20/2023    carotid u/s (10/20/23): < 50 % stenoses bilaterally, (+) plaque    Hx of chest x-ray 02/28/2018    CXR (2/28/18): lenticular opacities at bases sugg of atelectasis    Hx of chest x-ray 09/16/2019    CXR (9/16/19): chronic changes of atelectasia dn scarring at right hilar and infrahilar region as well as prior healed    Hx of colonoscopy 06/04/2012    colonosc (6/4/12): polyp, diverticulosis, int hem; GI - Dr. George    Hx of colonoscopy 07/24/2017    colonosc (7/24/17): polyp; GI - Dr. George    Hx of colonoscopy 10/08/2024    colonosc (10/8/24): diverticulosis, int hemorrhoids, no further scfeening; GI - Dr. George    Hx of CT scan of head 10/01/2023    nl CT head (10/1/23, [facility]), sinuses clear    Hx of echocardiogram 10/20/2023    ECHO (10/20/23) EF 56%, mild calcified AV with tr-mild AR, tr TR/ID, neg saline test    Hx of EEG 12/12/2023    nl EEG (12/12/23)    Hx of exercise stress test 02/11/2015    nl GXT (2/11/15)    Hx of MRI bilat hips 08/19/2022    MRI hips (8/19/22): mod arthritis changes bilat with diffuse labral tearing; paralabral cysts L>R, no effusion, sclerotic lesions right hemipelvis, indeterminate    Hx of MRI brain 02/09/2020    brain MRI (2/9/20): Mild chronic small vessel ischemic changes, fluid in mastoid cells c/w chronic paranasal sinusitis    Hx of MRI brain 10/19/2023    MRI brain (10/19/23): perivent changes c/w chronic small vessel ischemic disease; mastoid air cells and paranasal sinuses are clear    Hx of MRI L-spine 06/14/2023    MRI L-spine (6/14/23): multilevel spinal canal and neural foraminal stenosis, severe at L4-5    Hx of MRI lumbar spine 08/19/2022    MRI L-spine (8/19/22): diffuse arthritis changes, severe canal stenosis L4-5, mod-severe left/mod right NFS L5-S1    Left-sided Bell's palsy 02/10/2020    2/9/20 ER visit - RX valtrex 1000mg BIDx7d and pred 40/20/10, each for 5 days; nl brain MRI except Mild chronic small vessel ischemic  changes and fluid in mastoid cells c/w chronic paranasal sinusitis    Trochanteric bursitis of right hip 2017    s/p injection (17), resolved; ortho - Dr. Trevino       Family History:     Family History   Problem Relation Age of Onset    Heart attack Mother     Diabetes Mother     Heart disease Mother     Hypertension Mother     Hypertension Father     Heart disease Father     Heart attack Father     Aneurysm Father         AAA    Coronary artery disease Father         CABG    Heart failure Father     Arrhythmia Father         pacemaker    Skin cancer Father     Valvular heart disease Father     Fibromyalgia Father     Hyperlipidemia Sister     Hyperlipidemia Sister     Diabetes Sister     Stroke Sister         Caused by shingles    Anxiety disorder Sister     Hypertension Brother     Hyperlipidemia Brother     Hypertension Brother     Hyperlipidemia Brother     Heart attack Brother     Early death Brother         Heart Attack    Arrhythmia Brother         pacemaker    Heart attack Cousin         pat cousin  age 44    Heart disease Paternal Grandmother        Social History:    reports that he has never smoked. He has never been exposed to tobacco smoke. He has never used smokeless tobacco. He reports current alcohol use of about 1.0 standard drink of alcohol per week. He reports that he does not use drugs.   SMOKING STATUS: Non-smoker    Surgical History:     Past Surgical History:   Procedure Laterality Date    AMPUTATION DIGIT Left      History of Amputated Index Finger DIP Joint(s) secondary to injury at bleachers; s/p failed surgery x2     BACK SURGERY      BUNIONECTOMY  10/2015    s/p Hudson bunionectomy, 2nd metatarsal osteotomy shortening, arthrodesis (10/15); podiatry - Dr. Davidson    CATARACT EXTRACTION Bilateral 04/15/2024    s/p cataract surgery (L 4/15/24, R 24);  Dr. Elizabeth    KNEE SURGERY Left     LAMINECTOMY      LUMBAR LAMINECTOMY DISCECTOMY DECOMPRESSION Left 2023     "Procedure: MIS Laminectomy left-sided approach L4-5 with Bilateral Laminoforaminotomy L4-5;  Surgeon: Hans Valentin MD;  Location: [facility];  Service: Neurosurgery;  Laterality: Left;    METATARSAL OSTEOTOMY      history of buniuon correction with metatarsal osteotomy  s/p Hudson bunionectomty, 2nd metatarsal osteotomy shortening, arthrodesis (10/15);  podiatry - Dr. Davidson       Allergies:   Cosamin ds [glucosamine-chondroitin], Erythromycin, Glucosamine-chondroitin, Naproxen, Sulfa antibiotics, Sulfamethoxazole, and Trimethoprim    Physical Exam:    Vital Signs:/60 (BP Location: Right arm, Patient Position: Sitting, Cuff Size: Adult)   Temp 98 °F (36.7 °C) (Infrared)   Ht 172.7 cm (68\")   Wt 83.9 kg (185 lb)   BMI 28.13 kg/m²    BMI: Body mass index is 28.13 kg/m².       Incision:   The incision is well-healed and well approximated.  No signs of infection, bleeding, or erythema.    Musculoskeletal:                                            Dorsiflexion is 5/5 Bilaterally                    Plantarflexion is 5/5 bilaterally                    Hip Flexion 5/5 bilaterally.                        Neurologic:                                         The patient is alert and oriented by 3.                       Sensation is equal bilaterally with no deficit.                        Reflexes:  2+ throughout   No Morse's no clonus no long track sign    Medical Decision Making    Data Review:   No new films reviewed at this visit    Diagnosis:   Status post L4-5 microlaminectomy and discectomy    Treatment Options:   I think the patient is showing signs of claudicating likely secondary to progression of lumbar disease.  We will proceed with lumbar MRI as well as a flexion-extension x-ray to delineate if there is any further surgical pathology.    I have discussed with him hopefully we can avoid surgery and proceed with pain management but he is relatively healthy other than his A-fib and Eliquis think " considering even a fusion is not out of question.    Patient understands and will follow-up with us after he gets back from [place] and gets his scans.    Patient is went through a lot of conservative therapy including daily walking exercises and he has been doing in-home therapist directed physical therapy.     BMI is >= 25 and <30. (Overweight) The following options were offered after discussion;: weight loss educational material (shared in after visit summary)     Diagnosis Plan   1. Spinal stenosis of lumbar region with neurogenic claudication  MRI Lumbar Spine With & Without Contrast    XR Spine Lumbar Complete With Flex & Ext

## 2024-11-18 ENCOUNTER — LAB (OUTPATIENT)
Dept: LAB | Facility: HOSPITAL | Age: 69
End: 2024-11-18
Payer: MEDICARE

## 2024-11-18 ENCOUNTER — OFFICE VISIT (OUTPATIENT)
Dept: INTERNAL MEDICINE | Facility: CLINIC | Age: 69
End: 2024-11-18
Payer: MEDICARE

## 2024-11-18 VITALS
OXYGEN SATURATION: 95 % | HEIGHT: 68 IN | SYSTOLIC BLOOD PRESSURE: 120 MMHG | WEIGHT: 179.8 LBS | DIASTOLIC BLOOD PRESSURE: 62 MMHG | BODY MASS INDEX: 27.25 KG/M2 | HEART RATE: 60 BPM

## 2024-11-18 DIAGNOSIS — E78.5 DYSLIPIDEMIA: Chronic | ICD-10-CM

## 2024-11-18 DIAGNOSIS — I48.0 PAROXYSMAL ATRIAL FIBRILLATION: Chronic | ICD-10-CM

## 2024-11-18 DIAGNOSIS — I10 ESSENTIAL HYPERTENSION: Chronic | ICD-10-CM

## 2024-11-18 DIAGNOSIS — Z00.00 MEDICARE ANNUAL WELLNESS VISIT, SUBSEQUENT: ICD-10-CM

## 2024-11-18 DIAGNOSIS — M48.062 SPINAL STENOSIS OF LUMBAR REGION WITH NEUROGENIC CLAUDICATION: Chronic | ICD-10-CM

## 2024-11-18 DIAGNOSIS — R73.01 IMPAIRED FASTING GLUCOSE: Chronic | ICD-10-CM

## 2024-11-18 DIAGNOSIS — Z00.00 MEDICARE ANNUAL WELLNESS VISIT, SUBSEQUENT: Primary | ICD-10-CM

## 2024-11-18 DIAGNOSIS — Z12.5 PROSTATE CANCER SCREENING: ICD-10-CM

## 2024-11-18 LAB
ALBUMIN SERPL-MCNC: 4.4 G/DL (ref 3.5–5.2)
ALBUMIN UR-MCNC: 7.7 MG/DL
ALBUMIN/GLOB SERPL: 2.1 G/DL
ALP SERPL-CCNC: 90 U/L (ref 39–117)
ALT SERPL W P-5'-P-CCNC: 23 U/L (ref 1–41)
ANION GAP SERPL CALCULATED.3IONS-SCNC: 10 MMOL/L (ref 5–15)
AST SERPL-CCNC: 20 U/L (ref 1–40)
BACTERIA UR QL AUTO: NORMAL /HPF
BASOPHILS # BLD AUTO: 0.04 10*3/MM3 (ref 0–0.2)
BASOPHILS NFR BLD AUTO: 0.4 % (ref 0–1.5)
BILIRUB SERPL-MCNC: 0.7 MG/DL (ref 0–1.2)
BILIRUB UR QL STRIP: NEGATIVE
BUN SERPL-MCNC: 15 MG/DL (ref 8–23)
BUN/CREAT SERPL: 14.7 (ref 7–25)
CALCIUM SPEC-SCNC: 9.3 MG/DL (ref 8.6–10.5)
CHLORIDE SERPL-SCNC: 105 MMOL/L (ref 98–107)
CHOLEST SERPL-MCNC: 124 MG/DL (ref 0–200)
CLARITY UR: CLEAR
CO2 SERPL-SCNC: 26 MMOL/L (ref 22–29)
COLOR UR: YELLOW
CREAT SERPL-MCNC: 1.02 MG/DL (ref 0.76–1.27)
CREAT UR-MCNC: 247.7 MG/DL
DEPRECATED RDW RBC AUTO: 41.5 FL (ref 37–54)
EGFRCR SERPLBLD CKD-EPI 2021: 78.6 ML/MIN/1.73
EOSINOPHIL # BLD AUTO: 0.12 10*3/MM3 (ref 0–0.4)
EOSINOPHIL NFR BLD AUTO: 1.3 % (ref 0.3–6.2)
ERYTHROCYTE [DISTWIDTH] IN BLOOD BY AUTOMATED COUNT: 12.4 % (ref 12.3–15.4)
EXPIRATION DATE: ABNORMAL
GLOBULIN UR ELPH-MCNC: 2.1 GM/DL
GLUCOSE SERPL-MCNC: 92 MG/DL (ref 65–99)
GLUCOSE UR STRIP-MCNC: NEGATIVE MG/DL
HBA1C MFR BLD: 5.9 % (ref 4.5–5.7)
HCT VFR BLD AUTO: 47.5 % (ref 37.5–51)
HDLC SERPL-MCNC: 42 MG/DL (ref 40–60)
HGB BLD-MCNC: 15.8 G/DL (ref 13–17.7)
HGB UR QL STRIP.AUTO: NEGATIVE
HYALINE CASTS UR QL AUTO: NORMAL /LPF
IMM GRANULOCYTES # BLD AUTO: 0.02 10*3/MM3 (ref 0–0.05)
IMM GRANULOCYTES NFR BLD AUTO: 0.2 % (ref 0–0.5)
KETONES UR QL STRIP: ABNORMAL
LDLC SERPL CALC-MCNC: 66 MG/DL (ref 0–100)
LDLC/HDLC SERPL: 1.57 {RATIO}
LEUKOCYTE ESTERASE UR QL STRIP.AUTO: NEGATIVE
LYMPHOCYTES # BLD AUTO: 1.12 10*3/MM3 (ref 0.7–3.1)
LYMPHOCYTES NFR BLD AUTO: 12.5 % (ref 19.6–45.3)
Lab: ABNORMAL
MCH RBC QN AUTO: 30.6 PG (ref 26.6–33)
MCHC RBC AUTO-ENTMCNC: 33.3 G/DL (ref 31.5–35.7)
MCV RBC AUTO: 92.1 FL (ref 79–97)
MICROALBUMIN/CREAT UR: 31.1 MG/G (ref 0–29)
MONOCYTES # BLD AUTO: 0.75 10*3/MM3 (ref 0.1–0.9)
MONOCYTES NFR BLD AUTO: 8.4 % (ref 5–12)
NEUTROPHILS NFR BLD AUTO: 6.88 10*3/MM3 (ref 1.7–7)
NEUTROPHILS NFR BLD AUTO: 77.2 % (ref 42.7–76)
NITRITE UR QL STRIP: NEGATIVE
NRBC BLD AUTO-RTO: 0 /100 WBC (ref 0–0.2)
PH UR STRIP.AUTO: 6 [PH] (ref 5–8)
PLATELET # BLD AUTO: 195 10*3/MM3 (ref 140–450)
PMV BLD AUTO: 11.3 FL (ref 6–12)
POTASSIUM SERPL-SCNC: 4.1 MMOL/L (ref 3.5–5.2)
PROT SERPL-MCNC: 6.5 G/DL (ref 6–8.5)
PROT UR QL STRIP: ABNORMAL
PSA SERPL-MCNC: 0.6 NG/ML (ref 0–4)
RBC # BLD AUTO: 5.16 10*6/MM3 (ref 4.14–5.8)
RBC # UR STRIP: NORMAL /HPF
REF LAB TEST METHOD: NORMAL
SODIUM SERPL-SCNC: 141 MMOL/L (ref 136–145)
SP GR UR STRIP: 1.02 (ref 1–1.03)
SQUAMOUS #/AREA URNS HPF: NORMAL /HPF
TRIGL SERPL-MCNC: 81 MG/DL (ref 0–150)
TSH SERPL DL<=0.05 MIU/L-ACNC: 2.44 UIU/ML (ref 0.27–4.2)
UROBILINOGEN UR QL STRIP: ABNORMAL
VLDLC SERPL-MCNC: 16 MG/DL (ref 5–40)
WBC # UR STRIP: NORMAL /HPF
WBC NRBC COR # BLD AUTO: 8.93 10*3/MM3 (ref 3.4–10.8)

## 2024-11-18 PROCEDURE — G0103 PSA SCREENING: HCPCS

## 2024-11-18 PROCEDURE — 84443 ASSAY THYROID STIM HORMONE: CPT

## 2024-11-18 PROCEDURE — 82570 ASSAY OF URINE CREATININE: CPT

## 2024-11-18 PROCEDURE — 81001 URINALYSIS AUTO W/SCOPE: CPT

## 2024-11-18 PROCEDURE — 1125F AMNT PAIN NOTED PAIN PRSNT: CPT | Performed by: INTERNAL MEDICINE

## 2024-11-18 PROCEDURE — 80061 LIPID PANEL: CPT

## 2024-11-18 PROCEDURE — 3078F DIAST BP <80 MM HG: CPT | Performed by: INTERNAL MEDICINE

## 2024-11-18 PROCEDURE — 1159F MED LIST DOCD IN RCRD: CPT | Performed by: INTERNAL MEDICINE

## 2024-11-18 PROCEDURE — 1170F FXNL STATUS ASSESSED: CPT | Performed by: INTERNAL MEDICINE

## 2024-11-18 PROCEDURE — 3074F SYST BP LT 130 MM HG: CPT | Performed by: INTERNAL MEDICINE

## 2024-11-18 PROCEDURE — 93000 ELECTROCARDIOGRAM COMPLETE: CPT | Performed by: INTERNAL MEDICINE

## 2024-11-18 PROCEDURE — 82043 UR ALBUMIN QUANTITATIVE: CPT

## 2024-11-18 PROCEDURE — 99397 PER PM REEVAL EST PAT 65+ YR: CPT | Performed by: INTERNAL MEDICINE

## 2024-11-18 PROCEDURE — 3044F HG A1C LEVEL LT 7.0%: CPT | Performed by: INTERNAL MEDICINE

## 2024-11-18 PROCEDURE — 80053 COMPREHEN METABOLIC PANEL: CPT

## 2024-11-18 PROCEDURE — 85025 COMPLETE CBC W/AUTO DIFF WBC: CPT

## 2024-11-18 PROCEDURE — 1160F RVW MEDS BY RX/DR IN RCRD: CPT | Performed by: INTERNAL MEDICINE

## 2024-11-18 PROCEDURE — 96160 PT-FOCUSED HLTH RISK ASSMT: CPT | Performed by: INTERNAL MEDICINE

## 2024-11-18 PROCEDURE — G0439 PPPS, SUBSEQ VISIT: HCPCS | Performed by: INTERNAL MEDICINE

## 2024-11-18 PROCEDURE — 83036 HEMOGLOBIN GLYCOSYLATED A1C: CPT | Performed by: INTERNAL MEDICINE

## 2024-11-18 NOTE — ASSESSMENT & PLAN NOTE
Hemodynamically stable, currently in SR; cont eliquis 5mg q12 (RX per cards); also on bisoprolol 5mg QD

## 2024-11-18 NOTE — ASSESSMENT & PLAN NOTE
Bilat low back, buttock and thigh pain without radiculopathy into the legs currently; note history of lumbar laminectomy/discectomy; MRI L-spine pending per neurosurgery

## 2024-11-18 NOTE — PROGRESS NOTES
ANNUAL WELLNESS VISIT    DRUG AND ALCOHOL USE      no tobacco use, alcohol intake:1 beers per week, and caffeine intake: 1 cups of caffeinated coffee per day    DIET AND PHYSICAL ACTIVITY     Diet: frequent junk food    Exercise: daily   Exercise Details: walking, house cleaning, yard work     MOOD DISORDER AND COGNITIVE SCREENING     PHQ-2 Depression Screening - components reviewed with patient; screening is negative for active depression.    Little interest or pleasure in doing things? Not at all   Feeling down, depressed, or hopeless? Not at all   PHQ-2 Total Score 0       Anxiety Screening Tool Used YES     AUDIT screening 1     Mini-Cog Performed   Yes    1. Tell Patient 3 Words Car house boat    2. Administer Clock Test normal    3. Recall 3 words  Car house boat     4. Number Correct Items 3    FUNCTIONAL ABILITY AND LEVEL OF SAFETY   Hearing no hearing loss     Wears Hearing Aids No       Current Activities Independent      none  - see Funct/Cog Status Intake     Fall Risk Assessment       Has difficulty with walking or balance  No         Timed Up and Go (TUG) Test  9 sec.       If >12 sec, normal    ADVANCED DIRECTIVE  Advance Care Planning   ACP discussion was held with the patient during this visit. Patient does not have an advance directive, information provided.  Advance Care Planning Discussion:  Patient does not have advanced directive and living will.  Reviewed desires for end of life care, which is to have comfort care.  Patient does not want extraordinary life-sustaining measures, including no prolonged artificial life support. Encouraged patient to ensure family is aware of desires/preferences. Confirmed wife is his healthcare surrogate. He states he and his wife are still working on the document.  Requested he bring copy for [facility] records when available.    PAIN SCREENING Do you have pain right now? yes      If so, 1-10 scale: 5     Intermittent     Do you have pain every day? Yes       Probable chronic pain: Yes     Recent Hospitalizations:  No hospitalization(s) within the last year..     MEDICATION REVIEW   - updated and reviewed (see Medication List).   - reviewed for potentially harmful drug-disease interactions in the elderly.   - reviewed for high risk medications in the elderly.   - aspirin use: No, on eliquis    BMI  Body mass index is 27.34 kg/m².  ______________________________________  Chief Complaint   Patient presents with    Medicare Wellness-subsequent    Hypertension    Hyperlipidemia    Impaired fasting glucose       History of Present Illness  69 y.o.  male presents for updated phys examination and wellness visit as well as f/u on BP, cholesterol, sugars, and afib.  Denies palpitations.  Does notice some increased bleeding with nicks.  Has switched to electric razor.    Had great trip to [place].    Reports improved vision after cataract surgery earlier this year.    Notes MRI of back pending tomorrow.  Has had recurrent leg pain, mostly in the mornings.  The pain does not radiate down to his feet.  X-rays were already performed by neurosurgery.  Notes history of lumbar surgery.    Review of Systems  Denies headaches, visual changes, CP, palpitations, SOB, cough, abd pain, n/v/d, difficulty with urination, numbness/tingling, falls, mood changes, lightheadedness, hearing changes, rashes.  ROS (+) for easy bleeding.     All other ROS reviewed and negative.    Current Outpatient Medications:     amLODIPine 2.5 MG QD    apixaban (Eliquis) 5 MG q12    atorvastatin 20 MG QD    bisoprolol 5 MG QD    carboxymethylcellulose (REFRESH PLUS) 0.5 % solution AD    cetirizine 10 MG QD    CHERRY QD    Cholecalciferol (VITAMIN D) 2000 UNITS QD    Cinnamon 500 MG QD    ELDERBERRY QD    fluticasone (FLONASE) 50 MCG/ACT nasal spray AD    CENTRUM SILVER ADULT 50+ QD    Probiotic Product QD    triamcinolone (KENALOG) 0.025 % cream AD      OPIOID SCREENING:  The patient does not have  "opioid prescription on his medication list. Medication list has been reviewed with the patient regarding potentially high risk medications and harmful drug interactions in patients over age 65.    VITALS:  /62   Pulse 60   Ht 172.7 cm (68\")   Wt 81.6 kg (179 lb 12.8 oz)   SpO2 95%   BMI 27.34 kg/m²     Physical Exam  Vitals and nursing note reviewed.   Constitutional:       General: He is not in acute distress.     Appearance: Normal appearance. He is well-developed.   HENT:      Head: Normocephalic.      Right Ear: Tympanic membrane, ear canal and external ear normal.      Left Ear: Tympanic membrane, ear canal and external ear normal.      Nose: Nose normal.   Eyes:      Extraocular Movements: Extraocular movements intact.      Conjunctiva/sclera: Conjunctivae normal.      Pupils: Pupils are equal, round, and reactive to light.      Comments: Wear glasses; baseline amblyopia   Neck:      Vascular: No carotid bruit (bilaterally).   Cardiovascular:      Rate and Rhythm: Normal rate and regular rhythm.      Heart sounds: Normal heart sounds.      Comments: 2+ PT pulses bilaterally  Pulmonary:      Effort: Pulmonary effort is normal. No respiratory distress.      Breath sounds: Normal breath sounds. No wheezing, rhonchi or rales.   Abdominal:      General: Bowel sounds are normal. There is no distension.      Palpations: Abdomen is soft. There is no mass.      Tenderness: There is no abdominal tenderness.   Genitourinary:     Comments: /FARNAZ deferred  Musculoskeletal:      Cervical back: Normal range of motion and neck supple.      Right lower leg: No edema.      Left lower leg: No edema.   Lymphadenopathy:      Cervical: No cervical adenopathy.   Skin:     General: Skin is warm and dry.      Findings: No rash.   Neurological:      Mental Status: He is alert and oriented to person, place, and time.      Gait: Gait normal.   Psychiatric:         Mood and Affect: Mood normal.         Behavior: Behavior " normal.       LABS  Results for orders placed or performed in visit on 11/18/24   POC Glycosylated Hemoglobin (Hb A1C)    Collection Time: 11/18/24  9:37 AM    Specimen: Blood   Result Value Ref Range    Hemoglobin A1C 5.9 (A) 4.5 - 5.7 %    Lot Number [id number]     Expiration Date 08/01/2026      Results for orders placed or performed in visit on 05/17/24   POC Glycosylated Hemoglobin (Hb A1C)    Collection Time: 05/17/24  8:43 AM    Specimen: Blood   Result Value Ref Range    Hemoglobin A1C 6.1 (A) 4.5 - 5.7 %                 1/18/24 LDL 71      ECG 12 Lead    Date/Time: 11/18/2024 9:51 AM  Performed by: Gina Adamson MD    Authorized by: Gina Adamson MD  Comparison: compared with previous ECG from 3/19/2024  Similar to previous ECG  Rhythm: sinus rhythm  Rate: normal  BPM: 57  Conduction: right bundle branch block  ST Segments: ST segments normal  T Waves: T waves normal  QRS axis: normal and left  Clinical impression comment: stable EKG          ASSESSMENT/PLAN    Diagnoses and all orders for this visit:    1. Medicare annual wellness visit, subsequent (Primary)  Assessment & Plan:  Health maintenance - COVID19 vacc 9/24; flu vacc 8/24; RSV 9/23; Prevnar 8/18; PVX 10/19; Td 10/23 (next Tdap due 2033), HAV and Shingrix done; colonosc 10/24, no further per Dr. George; PSA pending; eye exam with  Dr. Elizabeth pending 3/25 (had cataract surgery in 4/24); dental exam q6 mos; (+) seat belt use    Consultants:  Patient Care Team:  Gina Adamson MD as PCP - General  MassaFranny MD as Consulting Physician (Dermatology)  Erwin George MD as Consulting Physician (Gastroenterology)  Paramjit Trevino MD as Consulting Physician (Orthopedic Surgery)  Remington Eller DPM as Consulting Physician (Podiatry)  Kavon Manzo DMD (Dental General Practice)  Yoseph Bernal MD as Consulting Physician (Orthopedic Surgery)  Dilip Decker MD as Consulting Physician (Pain  Medicine)  Hans Valentin MD as Surgeon (Neurosurgery)  Laury Duarte APRN as Nurse Practitioner (Neurology)  Armando Rojas MD as Cardiologist (Cardiology)  Leeanne Elizabeth MD as Consulting Physician (Ophthalmology)  Megan Valentin MD as Consulting Physician (Dermatology)        2. Hypertension  Assessment & Plan:  BP stable 120/62; cont amlo 2.5mg QD, bisoprolol 5mg QD, both per cards    Orders:  -     ECG 12 Lead    3. Dyslipidemia  Assessment & Plan:  Needs updated lipids on atorvastatin 20mg QD (RX per cards); goal LDL < 130, ideally < 100      4. Impaired fasting glucose  Assessment & Plan:  BG control stable with A1C 5.9; encouraged reg phys activity to decr insulin resistance, moderation in unhealthy starches/sweets; f/u A1C in 6 mos      Orders:  -     POC Glycosylated Hemoglobin (Hb A1C)    5. Paroxysmal atrial fibrillation  Assessment & Plan:  Hemodynamically stable, currently in SR; cont eliquis 5mg q12 (RX per cards); also on bisoprolol 5mg QD    Orders:  -     ECG 12 Lead    6. Spinal stenosis of lumbar region with neurogenic claudication  Assessment & Plan:  Bilat low back, buttock and thigh pain without radiculopathy into the legs currently; note history of lumbar laminectomy/discectomy; MRI L-spine pending per neurosurgery          FOLLOW-UP  Needs fasting PE labs -on the way out the door  RTC 6 mos with A1C (and any other labs indicated)    Electronically signed by:    Gina Adamson MD, FACP  11/18/2024    EMR Dragon/Transcription Utilization  Please note that portions of this note were completed with a voice recognition program.
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
## 2024-11-19 ENCOUNTER — HOSPITAL ENCOUNTER (OUTPATIENT)
Dept: GENERAL RADIOLOGY | Facility: HOSPITAL | Age: 69
Discharge: HOME | End: 2024-11-19
Payer: MEDICARE

## 2024-11-19 ENCOUNTER — HOSPITAL ENCOUNTER (OUTPATIENT)
Dept: MRI IMAGING | Facility: HOSPITAL | Age: 69
Discharge: HOME | End: 2024-11-19
Payer: MEDICARE

## 2024-11-19 DIAGNOSIS — M48.062 SPINAL STENOSIS OF LUMBAR REGION WITH NEUROGENIC CLAUDICATION: ICD-10-CM

## 2024-11-19 PROCEDURE — 25510000002 GADOBENATE DIMEGLUMINE 529 MG/ML SOLUTION: Performed by: PHYSICIAN ASSISTANT

## 2024-11-19 PROCEDURE — 72114 X-RAY EXAM L-S SPINE BENDING: CPT

## 2024-11-19 PROCEDURE — 72158 MRI LUMBAR SPINE W/O & W/DYE: CPT

## 2024-11-19 PROCEDURE — A9577 INJ MULTIHANCE: HCPCS | Performed by: PHYSICIAN ASSISTANT

## 2024-11-19 RX ADMIN — GADOBENATE DIMEGLUMINE 15 ML: 529 INJECTION, SOLUTION INTRAVENOUS at 15:28

## 2024-11-22 ENCOUNTER — OFFICE VISIT (OUTPATIENT)
Dept: NEUROSURGERY | Facility: CLINIC | Age: 69
End: 2024-11-22
Payer: MEDICARE

## 2024-11-22 ENCOUNTER — PREP FOR SURGERY (OUTPATIENT)
Dept: OTHER | Facility: HOSPITAL | Age: 69
End: 2024-11-22
Payer: MEDICARE

## 2024-11-22 ENCOUNTER — TELEPHONE (OUTPATIENT)
Dept: INTERNAL MEDICINE | Facility: CLINIC | Age: 69
End: 2024-11-22
Payer: MEDICARE

## 2024-11-22 ENCOUNTER — TELEPHONE (OUTPATIENT)
Dept: CARDIOLOGY | Facility: CLINIC | Age: 69
End: 2024-11-22
Payer: MEDICARE

## 2024-11-22 VITALS
OXYGEN SATURATION: 100 % | HEIGHT: 68 IN | SYSTOLIC BLOOD PRESSURE: 138 MMHG | DIASTOLIC BLOOD PRESSURE: 80 MMHG | RESPIRATION RATE: 16 BRPM | HEART RATE: 72 BPM | WEIGHT: 183 LBS | BODY MASS INDEX: 27.74 KG/M2 | TEMPERATURE: 98.2 F

## 2024-11-22 DIAGNOSIS — M48.062 SPINAL STENOSIS, LUMBAR REGION, WITH NEUROGENIC CLAUDICATION: Primary | ICD-10-CM

## 2024-11-22 DIAGNOSIS — M51.16 LUMBAR DISC HERNIATION WITH RADICULOPATHY: ICD-10-CM

## 2024-11-22 DIAGNOSIS — M48.062 SPINAL STENOSIS OF LUMBAR REGION WITH NEUROGENIC CLAUDICATION: Primary | ICD-10-CM

## 2024-11-22 PROBLEM — R80.9 MICROALBUMINURIA: Status: ACTIVE | Noted: 2024-11-22

## 2024-11-22 PROCEDURE — 3079F DIAST BP 80-89 MM HG: CPT | Performed by: PHYSICIAN ASSISTANT

## 2024-11-22 PROCEDURE — 99214 OFFICE O/P EST MOD 30 MIN: CPT | Performed by: PHYSICIAN ASSISTANT

## 2024-11-22 PROCEDURE — 3075F SYST BP GE 130 - 139MM HG: CPT | Performed by: PHYSICIAN ASSISTANT
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
## 2024-11-22 NOTE — TELEPHONE ENCOUNTER
Criss at 's office is requesting patient to hold Eliquis for 3 days prior to an upcoming procedure. He will be having a Lumbar Myelogram. Please advise.

## 2024-11-22 NOTE — TELEPHONE ENCOUNTER
----- Message from Gina Adamson sent at 11/22/2024  2:41 AM EST -----  Dear Yoseph,    Thank you for obtaining the laboratories that we ordered.  I have received those results and would like to review them with you.    Your blood counts, thyroid test, electrolytes, and PSA (prostate blood test) are within normal limits.  This indicates no anemia as well as normal thyroid, liver, and kidney function. PSA is 0.596 (normal < 4.0). Fasting sugar was normal at 92 (goal < 100).    Your cholesterol profile is stable/improved, showing a total cholesterol of 124 (goal < 200).  Your triglycerides are acceptable at 81 with a goal < 150.  Your HDL, the good cholesterol, is 42.  HDL is heart protective, and the goal is > 40 in men.  Your LDL, the bad cholesterol and the most important value of the profile, is 66.  Goal for LDL is < 130, ideally < 100. With these results, please continue your current dose of atorvastatin.    The only abnormality is borderline microscopic protein spilling from the kidneys. Microalbumin/creatinine ratio should be < 30, and your value is borderline at 31.1. The best way to improve this value is to maintain good blood pressures and sugars as well as minimizing NSAIDs, which includes ibuprofen or naproxen.     Overall your labs are stable.  Let me know if you have any questions or concerns regarding these results.      Sincerely,  Gina Adamson MD, FACP

## 2024-11-26 DIAGNOSIS — M51.16 LUMBAR DISC HERNIATION WITH RADICULOPATHY: ICD-10-CM

## 2024-11-26 DIAGNOSIS — M48.062 SPINAL STENOSIS OF LUMBAR REGION WITH NEUROGENIC CLAUDICATION: Primary | ICD-10-CM

## 2024-12-12 ENCOUNTER — APPOINTMENT (OUTPATIENT)
Dept: CT IMAGING | Facility: HOSPITAL | Age: 69
End: 2024-12-12
Payer: MEDICARE

## 2024-12-12 ENCOUNTER — HOSPITAL ENCOUNTER (OUTPATIENT)
Facility: HOSPITAL | Age: 69
Setting detail: HOSPITAL OUTPATIENT SURGERY
Discharge: HOME | End: 2024-12-12
Attending: RADIOLOGY | Admitting: RADIOLOGY
Payer: MEDICARE

## 2024-12-12 ENCOUNTER — HOSPITAL ENCOUNTER (OUTPATIENT)
Dept: NEUROLOGY | Facility: HOSPITAL | Age: 69
Discharge: HOME | End: 2024-12-12
Payer: MEDICARE

## 2024-12-12 VITALS
HEIGHT: 68 IN | BODY MASS INDEX: 27.77 KG/M2 | OXYGEN SATURATION: 95 % | RESPIRATION RATE: 14 BRPM | SYSTOLIC BLOOD PRESSURE: 126 MMHG | TEMPERATURE: 97.2 F | HEART RATE: 70 BPM | WEIGHT: 183.2 LBS | DIASTOLIC BLOOD PRESSURE: 78 MMHG

## 2024-12-12 DIAGNOSIS — M48.062 SPINAL STENOSIS OF LUMBAR REGION WITH NEUROGENIC CLAUDICATION: ICD-10-CM

## 2024-12-12 DIAGNOSIS — M51.16 LUMBAR DISC HERNIATION WITH RADICULOPATHY: ICD-10-CM

## 2024-12-12 PROCEDURE — 95886 MUSC TEST DONE W/N TEST COMP: CPT

## 2024-12-12 PROCEDURE — 25510000002 IODIXANOL PER 1 ML: Performed by: RADIOLOGY

## 2024-12-12 PROCEDURE — 72132 CT LUMBAR SPINE W/DYE: CPT

## 2024-12-12 PROCEDURE — 62304 MYELOGRAPHY LUMBAR INJECTION: CPT | Performed by: RADIOLOGY

## 2024-12-12 PROCEDURE — 25010000002 LIDOCAINE PF 1% 1 % SOLUTION: Performed by: RADIOLOGY

## 2024-12-12 PROCEDURE — 95913 NRV CNDJ TEST 13/> STUDIES: CPT

## 2024-12-12 RX ORDER — LIDOCAINE HYDROCHLORIDE 10 MG/ML
INJECTION, SOLUTION EPIDURAL; INFILTRATION; INTRACAUDAL; PERINEURAL
Status: DISCONTINUED | OUTPATIENT
Start: 2024-12-12 | End: 2024-12-12 | Stop reason: HOSPADM

## 2024-12-12 RX ORDER — IODIXANOL 320 MG/ML
INJECTION, SOLUTION INTRAVASCULAR
Status: DISCONTINUED | OUTPATIENT
Start: 2024-12-12 | End: 2024-12-12 | Stop reason: HOSPADM

## 2024-12-14 PROBLEM — G56.03 BILATERAL CARPAL TUNNEL SYNDROME: Chronic | Status: ACTIVE | Noted: 2024-12-14

## 2024-12-14 PROBLEM — H43.811 POSTERIOR VITREOUS DETACHMENT OF RIGHT EYE: Chronic | Status: ACTIVE | Noted: 2021-05-11

## 2024-12-14 PROBLEM — M51.16 LUMBAR DISC HERNIATION WITH RADICULOPATHY: Chronic | Status: ACTIVE | Noted: 2023-08-17

## 2024-12-14 PROBLEM — K57.30 DIVERTICULOSIS OF COLON: Chronic | Status: ACTIVE | Noted: 2024-10-09

## 2024-12-14 PROBLEM — G56.03 BILATERAL CARPAL TUNNEL SYNDROME: Status: ACTIVE | Noted: 2024-12-14

## 2024-12-14 PROBLEM — R80.9 MICROALBUMINURIA: Chronic | Status: ACTIVE | Noted: 2024-11-22

## 2024-12-14 PROBLEM — K64.8 INTERNAL HEMORRHOIDS: Chronic | Status: ACTIVE | Noted: 2024-10-09

## 2024-12-14 PROBLEM — M53.86 SCIATICA ASSOCIATED WITH DISORDER OF LUMBAR SPINE: Chronic | Status: ACTIVE | Noted: 2023-07-07

## 2024-12-19 ENCOUNTER — OFFICE VISIT (OUTPATIENT)
Dept: NEUROSURGERY | Facility: CLINIC | Age: 69
End: 2024-12-19
Payer: MEDICARE

## 2024-12-19 VITALS — TEMPERATURE: 98 F | BODY MASS INDEX: 28.34 KG/M2 | HEIGHT: 68 IN | WEIGHT: 187 LBS

## 2024-12-19 DIAGNOSIS — M48.062 SPINAL STENOSIS OF LUMBAR REGION WITH NEUROGENIC CLAUDICATION: ICD-10-CM

## 2024-12-19 DIAGNOSIS — M51.16 LUMBAR DISC HERNIATION WITH RADICULOPATHY: Primary | ICD-10-CM

## 2024-12-19 PROCEDURE — 99214 OFFICE O/P EST MOD 30 MIN: CPT | Performed by: NEUROLOGICAL SURGERY
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
## 2024-12-30 ENCOUNTER — OFFICE VISIT (OUTPATIENT)
Dept: PAIN MEDICINE | Facility: CLINIC | Age: 69
End: 2024-12-30
Payer: MEDICARE

## 2024-12-30 VITALS — HEIGHT: 68 IN | WEIGHT: 190.1 LBS | BODY MASS INDEX: 28.81 KG/M2

## 2024-12-30 DIAGNOSIS — M48.062 SPINAL STENOSIS OF LUMBAR REGION WITH NEUROGENIC CLAUDICATION: Primary | ICD-10-CM

## 2024-12-30 PROCEDURE — 1125F AMNT PAIN NOTED PAIN PRSNT: CPT

## 2024-12-30 PROCEDURE — G2211 COMPLEX E/M VISIT ADD ON: HCPCS

## 2024-12-30 PROCEDURE — 1160F RVW MEDS BY RX/DR IN RCRD: CPT

## 2024-12-30 PROCEDURE — 99214 OFFICE O/P EST MOD 30 MIN: CPT

## 2024-12-30 PROCEDURE — 1159F MED LIST DOCD IN RCRD: CPT
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
## 2025-01-02 ENCOUNTER — TELEPHONE (OUTPATIENT)
Dept: PAIN MEDICINE | Facility: CLINIC | Age: 70
End: 2025-01-02
Payer: MEDICARE

## 2025-01-02 NOTE — TELEPHONE ENCOUNTER
----- Message from Armando Rojas sent at 12/31/2024  2:37 PM EST -----  Revised Cardiac Risk Index Tool    Patient Name:  Yoseph Granados Jr.       MRN: [id number]                    Age: 69 y.o.     YOB: 1955    Clinical Risk Factors:       0    Risk of MI, Pulmonary Embolus, V-Fib, Cardiac Arrest or Complete Heart Block:       0 = (0.5% risk)    Rate of Cardiac Death, NonFatal MI and NonFatal Cardiac Arrest:       No risk factors = 0.4% (95% CI 0.1 - 0.8)    Comments:       Careful hemodynamic control (I.e, HR 60-70 bpm, no hypotension)       If patient has been on beta blocker, continue throughout pre-operative period, if stable BP.       Continue pre-op statin if no contraindications.       Patient can stop his anticoagulant 3 days before surgery and start 24 to 48 hours based on his procedure bleeding risk.      Armando Rojas MD  12/31/24, 2:38 PM EST  ----- Message -----  From: Nicolle Pardo MA  Sent: 12/31/2024  11:13 AM EST  To: Armando Rojas MD

## 2025-01-09 ENCOUNTER — DOCUMENTATION (OUTPATIENT)
Dept: PAIN MEDICINE | Facility: CLINIC | Age: 70
End: 2025-01-09

## 2025-01-09 ENCOUNTER — OUTSIDE FACILITY SERVICE (OUTPATIENT)
Dept: PAIN MEDICINE | Facility: CLINIC | Age: 70
End: 2025-01-09
Payer: MEDICARE

## 2025-01-09 NOTE — PROGRESS NOTES
[facility]  [address] [place] [address]      PROCEDURE: Fluoroscopically-guided L3/4 Lumbar Interlaminar Epidural Steroid Injection     PRE-OP DIAGNOSIS: Lumbar spinal stenosis with neurogenic claudication  POST-OP DIAGNOSIS: Same    BLOOD THINNERS (ANTIPLATELETS/ANTICOAGULANTS): Were discussed with the patient and DASH Guidelines were followed. Last dose eliquis 1/5/25    CONSENT: Risks, benefits and options were explained to the patient, all questions were answered and written informed consent was obtained.     ANESTHESIA: Local Only     PROCEDURE NOTE: A pre-procedural time out was performed to confirm the correct patient, procedure, side, and site. Standard ASA monitors were applied and oxygen via nasal cannula was provided. All proceduralists donned sterile gloves with masks and surgical hats. The patient was placed prone with pillow under the abdomen and all pressure points padded. The patient's lumbar spine was prepped in standard fashion using [Chlorhexidine] and draped with sterile towels. The target lumbar interspace was identified using fluoroscopy. The overlying skin and subcutaneous tissue was anesthetized with 1% lidocaine. A 20 gauge 10 cm Tuohy needle was advanced using intermittent AP and lateral fluoroscopy. The ligamentum flavum was engaged. Access to the epidural space was gained using a loss of resistance technique to air. Needle placement was confirmed with 1 ml of Omnipaque 180 mgI/ml contrast using biplanar live fluoroscopic imaging. An epidurogram was noted without evidence of intravascular or intrathecal spread. After negative aspiration, a mixture containing 3 ml of preservative-free normal saline, dexamethasone 10mg steroid, lidocaine 1% - 2 ml local anesthetic for a total volume of 6 ml was injected under direct visualization with fluoroscopy. The Tuohy needle was flushed, removed and a bandage applied.     EBL: None     COMPLICATIONS:  None     The patient tolerated the procedure well. Vital signs were stable. Sensory and motor exam was unchanged from baseline. The patient was observed in recovery and was discharged after meeting established criteria.    FOLLOW UP: As scheduled     ADDITIONAL NOTES: Resume eliquis in 24hrs    [facility]   Dilip Decker MD      CODES:  [id number]

## 2025-01-16 ENCOUNTER — OFFICE VISIT (OUTPATIENT)
Dept: NEUROSURGERY | Facility: CLINIC | Age: 70
End: 2025-01-16
Payer: MEDICARE

## 2025-01-16 VITALS — HEIGHT: 68 IN | WEIGHT: 187.6 LBS | TEMPERATURE: 99.3 F | BODY MASS INDEX: 28.43 KG/M2

## 2025-01-16 DIAGNOSIS — M51.16 LUMBAR DISC HERNIATION WITH RADICULOPATHY: Primary | ICD-10-CM

## 2025-01-16 DIAGNOSIS — M48.062 SPINAL STENOSIS OF LUMBAR REGION WITH NEUROGENIC CLAUDICATION: ICD-10-CM

## 2025-01-16 PROCEDURE — 99214 OFFICE O/P EST MOD 30 MIN: CPT | Performed by: NEUROLOGICAL SURGERY
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
## 2025-01-28 ENCOUNTER — PATIENT MESSAGE (OUTPATIENT)
Dept: PAIN MEDICINE | Facility: CLINIC | Age: 70
End: 2025-01-28
Payer: MEDICARE

## 2025-02-03 NOTE — PROGRESS NOTES
Follow-up Visit      Date: 2025  Patient Name: Yoseph Granados Jr.  : 1955   MRN: [id number]     Chief Complaint:    Chief Complaint   Patient presents with    Paroxysmal atrial fibrillation       History of Present Illness: Yoseph Granados Jr. is a 69 y.o. male who is here today for follow-up on paroxysmal atrial fibrillation.    Patient has been doing fine.  Patient denies any palpitations any evidence of atrial fibrillation.  He did have his spinal injection and he did have some runs of A-fib and PVC.  He denies any chest pain any shortness of breath any dizziness any palpitations or any other symptoms.    Patient denies any weight loss or any weight gain.  Patient denies any symptoms of claudication or any other significant problem.      Problem List     CARDIAC  Coronary Artery Disease:   GXT : Normal  Stress test 10/25/2023: No myocardial perfusion defect    Myocardium:   Echo 10/20/2023: EF 56%, negative bubble    Valvular:   Aortic calcification    Electrical:   MCOT 10/2023: Less than 1% A-fib, symptomatic with both sinus rhythm and with PVCs    Percardium:   Normal    VASCULAR  Cerebrovascular disease:   Carotid duplex 10/20/2023: Less than 50% stenosis, mild plaque bilaterally    CARDIAC RISK FACTORS  Hypertension  Dyslipidemia  2024   HDL 45 LDL 71  2024  TG 81 HDL 42 LDL 66  Family History   Sleep Apnea    NON-CARDIAC  Spinal stenosis with neurogenic claudication  Osteoarthritis    SURGERIES  Bunionectomy  Lumbar laminectomy and discectomy  Amputated index finger      Subjective      Review of Systems:   Review of Systems   All other systems reviewed and are negative.      Medications:     Current Outpatient Medications:     amLODIPine (NORVASC) 2.5 MG tablet, Take 1 tablet by mouth Daily., Disp: 90 tablet, Rfl: 3    apixaban (Eliquis) 5 MG tablet tablet, TAKE 1 TABLET BY MOUTH TWICE A DAY, Disp: 180 tablet, Rfl: 3    atorvastatin (LIPITOR) 20 MG tablet,  "Take 1 tablet by mouth Daily., Disp: 90 tablet, Rfl: 3    bisoprolol (ZEBeta) 5 MG tablet, Take 1 tablet by mouth Daily., Disp: 90 tablet, Rfl: 3    carboxymethylcellulose (REFRESH PLUS) 0.5 % solution, 3 (Three) Times a Day As Needed for Dry Eyes., Disp: , Rfl:     cetirizine (zyrTEC) 10 MG tablet, Take 1 tablet by mouth Daily., Disp: , Rfl:     HE PO, Take  by mouth., Disp: , Rfl:     Cholecalciferol (VITAMIN D) 2000 UNITS capsule, Take 1 capsule by mouth Daily., Disp: , Rfl:     Cinnamon 500 MG tablet, Take 1 tablet by mouth daily., Disp: , Rfl:     ELDERBERRY PO, Take  by mouth., Disp: , Rfl:     fluticasone (FLONASE) 50 MCG/ACT nasal spray, 2 sprays into the nostril(s) as directed by provider Daily., Disp: 16 g, Rfl: 5    Multiple Vitamins-Minerals (CENTRUM SILVER ADULT 50+ PO), Take 1 tablet by mouth Daily., Disp: , Rfl:     Probiotic Product (PROBIOTIC-10 PO), Take  by mouth., Disp: , Rfl:     triamcinolone (KENALOG) 0.025 % cream, , Disp: , Rfl:     Allergies:   Allergies   Allergen Reactions    Cosamin Ds [Glucosamine-Chondroitin] Hives    Erythromycin Hives    Glucosamine-Chondroitin Hives    Naproxen Hives    Sulfa Antibiotics Hives    Sulfamethoxazole Hives    Trimethoprim Hives       Objective     Physical Exam:  Vitals:    02/04/25 1047   BP: 130/70   BP Location: Right arm   Patient Position: Sitting   Cuff Size: Adult   Pulse: 62   SpO2: 99%   Weight: 86.4 kg (190 lb 6.4 oz)   Height: 172.7 cm (68\")     Body mass index is 28.95 kg/m².    Constitutional:       General: Not in acute distress.     Appearance: Healthy appearance. Not in distress.     Neck:     JVP:Not elevated     Carotid artery: Normal    Pulmonary:      Effort: Pulmonary effort is normal.      Breath sounds: Normal breath sounds. No wheezing. No rhonchi. No rales.     Cardiovascular:      Normal rate. Regular rhythm. Normal S1. Normal S2.      Murmurs: There is no significant murmur.      No gallop. No click. No rub.     Abdominal: "      General: Bowel sounds are normal.      Palpations: Abdomen is soft.      Tenderness: There is no abdominal tenderness.    Extremities:     Pulses:Normal radial and pedal pulses     Edema:no edema    Smoking Cessation:   Tobacco Product History : Patient never smoked    Lab Review:   Lab Results   Component Value Date    GLUCOSE 92 11/18/2024    BUN 15 11/18/2024    CREATININE 1.02 11/18/2024    EGFRIFNONA 84 11/03/2021    EGFRIFAFRI 97 11/03/2021    BCR 14.7 11/18/2024    K 4.1 11/18/2024    CO2 26.0 11/18/2024    CALCIUM 9.3 11/18/2024    PROTENTOTREF 6.4 11/09/2023    ALBUMIN 4.4 11/18/2024    LABIL2 3.0 11/09/2023    AST 20 11/18/2024    ALT 23 11/18/2024     Lab Results   Component Value Date    WBC 8.93 11/18/2024    HGB 15.8 11/18/2024    HCT 47.5 11/18/2024    MCV 92.1 11/18/2024     11/18/2024     Lab Results   Component Value Date    TSH 2.440 11/18/2024             Assessment / Plan      Assessment:   Diagnosis Plan   1. Paroxysmal atrial fibrillation        2. Dyslipidemia        3. Hypertension             Plan:  Patient Pamela has been under good control.  She is going to continue taking his Eliquis.  If needed he can stop 48 to 78 hours before surgery and resume 24 hours after surgery.  His cholesterol has been under good control and we will continue with the current medications.  His blood pressure has been under good control and we will continue him with his current medications.      Follow Up:       Return in about 1 year (around 2/4/2026).    Armando Rojas MD

## 2025-02-04 ENCOUNTER — OFFICE VISIT (OUTPATIENT)
Dept: CARDIOLOGY | Facility: CLINIC | Age: 70
End: 2025-02-04
Payer: MEDICARE

## 2025-02-04 VITALS
WEIGHT: 190.4 LBS | SYSTOLIC BLOOD PRESSURE: 130 MMHG | BODY MASS INDEX: 28.85 KG/M2 | HEART RATE: 62 BPM | DIASTOLIC BLOOD PRESSURE: 70 MMHG | HEIGHT: 68 IN | OXYGEN SATURATION: 99 %

## 2025-02-04 DIAGNOSIS — E78.5 DYSLIPIDEMIA: Chronic | ICD-10-CM

## 2025-02-04 DIAGNOSIS — I48.0 PAROXYSMAL ATRIAL FIBRILLATION: Primary | ICD-10-CM

## 2025-02-04 DIAGNOSIS — I10 ESSENTIAL HYPERTENSION: ICD-10-CM

## 2025-02-04 PROCEDURE — 3075F SYST BP GE 130 - 139MM HG: CPT | Performed by: INTERNAL MEDICINE

## 2025-02-04 PROCEDURE — 3078F DIAST BP <80 MM HG: CPT | Performed by: INTERNAL MEDICINE

## 2025-02-04 PROCEDURE — 99213 OFFICE O/P EST LOW 20 MIN: CPT | Performed by: INTERNAL MEDICINE

## 2025-02-05 ENCOUNTER — OFFICE VISIT (OUTPATIENT)
Dept: PAIN MEDICINE | Facility: CLINIC | Age: 70
End: 2025-02-05
Payer: MEDICARE

## 2025-02-05 VITALS — BODY MASS INDEX: 29.16 KG/M2 | WEIGHT: 192.4 LBS | HEIGHT: 68 IN

## 2025-02-05 DIAGNOSIS — M48.062 SPINAL STENOSIS OF LUMBAR REGION WITH NEUROGENIC CLAUDICATION: Primary | ICD-10-CM
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
## 2025-02-18 ENCOUNTER — OUTSIDE FACILITY SERVICE (OUTPATIENT)
Dept: PAIN MEDICINE | Facility: CLINIC | Age: 70
End: 2025-02-18
Payer: MEDICARE

## 2025-02-18 ENCOUNTER — DOCUMENTATION (OUTPATIENT)
Dept: PAIN MEDICINE | Facility: CLINIC | Age: 70
End: 2025-02-18

## 2025-02-18 PROCEDURE — 27096 INJECT SACROILIAC JOINT: CPT | Performed by: STUDENT IN AN ORGANIZED HEALTH CARE EDUCATION/TRAINING PROGRAM

## 2025-02-18 NOTE — PROGRESS NOTES
[facility]  [address] [place] [address]    PROCEDURE: Fluoroscopically-Guided Diagnostic and Therapeutic Sacroiliac Joint Injection - bilateral-      PRE-OP DIAGNOSIS: Sacroiliac joint pain  POST-OP DIAGNOSIS: Same    BLOOD THINNERS (ANTIPLATELETS/ANTICOAGULANTS): Were discussed with the patient and DASH Guidelines were followed.    CONSENT: Risks, benefits and options were explained to the patient, all questions were answered and written informed consent was obtained.     ANESTHESIA:  Local only     PROCEDURE NOTE: The patient was placed prone with the abdomen supported by a pillow and all pressure points were padded. Standard ASA monitors were applied. A timeout protocol was performed. Proper protective gear was worn by the physician including a mask, scrub cap, and sterile gloves. The patient's low back and sacral region were prepped with chlorhexidine and draped in a sterile fashion. Fluoroscopic guidance was used to identify the inferior and posterior opening to the bilateral sacroiliac joint. Under intermittent fluoroscopic guidance, the tip of a 25-gauge, 3.5-inch spinal needle with bent tip was advanced toward the inferior aspect of the sacroiliac joint opening and advanced slightly into the joint space. Appropriate needle tip position was confirmed in the AP and lateral view. After negative aspiration, a total of 0.5 mL of Omnipaque was injected with an acceptable arthrogram outlining the sacroiliac joint and no vascular uptake. Next, a mixture containing 40mg methylprednisolone with 2cc lidocaine 1% for a total volume of 3 ml was injected without any complications. The procedure was then repeated on the contralateral side. The needles were removed and sterile bandages applied at the needle sites. The patient was transferred to the stretcher and taken to recovery in stable condition.    EBL: None  COMPLICATIONS: None    The patient tolerated the procedure  well. Vital signs were stable. Sensory and motor exam was unchanged from baseline. The patient was observed in recovery and was discharged after meeting established criteria.    FOLLOW UP: as scheduled   ADDITIONAL NOTES: [n/a]    [facility]  Dilip Decker MD     Codes:  [id number]

## 2025-03-31 ENCOUNTER — OFFICE VISIT (OUTPATIENT)
Dept: PAIN MEDICINE | Facility: CLINIC | Age: 70
End: 2025-03-31
Payer: MEDICARE

## 2025-03-31 VITALS — BODY MASS INDEX: 28.49 KG/M2 | WEIGHT: 188 LBS | HEIGHT: 68 IN

## 2025-03-31 DIAGNOSIS — M48.062 SPINAL STENOSIS OF LUMBAR REGION WITH NEUROGENIC CLAUDICATION: ICD-10-CM

## 2025-03-31 DIAGNOSIS — G89.29 CHRONIC PAIN OF BOTH SHOULDERS: ICD-10-CM

## 2025-03-31 DIAGNOSIS — M70.61 GREATER TROCHANTERIC BURSITIS OF BOTH HIPS: ICD-10-CM

## 2025-03-31 DIAGNOSIS — M25.511 CHRONIC PAIN OF BOTH SHOULDERS: ICD-10-CM

## 2025-03-31 DIAGNOSIS — M25.512 CHRONIC PAIN OF BOTH SHOULDERS: ICD-10-CM

## 2025-03-31 DIAGNOSIS — M53.3 SACROILIAC JOINT DYSFUNCTION OF BOTH SIDES: Primary | ICD-10-CM

## 2025-03-31 DIAGNOSIS — M70.62 GREATER TROCHANTERIC BURSITIS OF BOTH HIPS: ICD-10-CM

## 2025-03-31 PROCEDURE — 99213 OFFICE O/P EST LOW 20 MIN: CPT

## 2025-03-31 PROCEDURE — 1160F RVW MEDS BY RX/DR IN RCRD: CPT

## 2025-03-31 PROCEDURE — 1159F MED LIST DOCD IN RCRD: CPT

## 2025-03-31 PROCEDURE — G2211 COMPLEX E/M VISIT ADD ON: HCPCS

## 2025-03-31 PROCEDURE — 1125F AMNT PAIN NOTED PAIN PRSNT: CPT
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
## 2025-04-01 PROBLEM — L82.0 INFLAMED SEBORRHEIC KERATOSIS: Status: ACTIVE | Noted: 2025-04-01

## 2025-04-02 DIAGNOSIS — M70.61 GREATER TROCHANTERIC BURSITIS OF BOTH HIPS: Primary | ICD-10-CM

## 2025-04-02 DIAGNOSIS — M53.3 SACROILIAC JOINT DYSFUNCTION OF BOTH SIDES: ICD-10-CM

## 2025-04-02 DIAGNOSIS — M70.62 GREATER TROCHANTERIC BURSITIS OF BOTH HIPS: Primary | ICD-10-CM

## 2025-04-09 ENCOUNTER — OFFICE VISIT (OUTPATIENT)
Age: 70
End: 2025-04-09
Payer: MEDICARE

## 2025-04-09 VITALS
SYSTOLIC BLOOD PRESSURE: 140 MMHG | BODY MASS INDEX: 28.72 KG/M2 | HEIGHT: 68 IN | WEIGHT: 189.5 LBS | DIASTOLIC BLOOD PRESSURE: 78 MMHG

## 2025-04-09 DIAGNOSIS — M75.80 ROTATOR CUFF TENDINITIS, UNSPECIFIED LATERALITY: ICD-10-CM

## 2025-04-09 DIAGNOSIS — M25.511 BILATERAL SHOULDER PAIN, UNSPECIFIED CHRONICITY: Primary | ICD-10-CM

## 2025-04-09 DIAGNOSIS — M25.512 BILATERAL SHOULDER PAIN, UNSPECIFIED CHRONICITY: Primary | ICD-10-CM

## 2025-04-09 DIAGNOSIS — M19.011 ARTHRITIS OF BOTH GLENOHUMERAL JOINTS: ICD-10-CM

## 2025-04-09 DIAGNOSIS — M19.012 ARTHRITIS OF BOTH GLENOHUMERAL JOINTS: ICD-10-CM

## 2025-04-09 NOTE — PROGRESS NOTES
[facility]opa[facility] Surgery Office Visit     Office Visit       Date: 04/09/2025   Patient Name: Yoseph Granados Jr.  MRN: [id number]  YOB: 1955    Referring Physician: Tiara Lagos PA*     Chief Complaint:   Chief Complaint   Patient presents with    Left Shoulder - Pain    Right Shoulder - Pain     History of Present Illness:   Yoseph Granados Jr. is a 70 y.o. male who presents with new problem of: bilateral shoulder pain.  Onset: atraumatic and gradual in nature. The issue has been ongoing for 4 month(s). Pain is a 7/10 on the pain scale. Pain is described as dull and burning. Associated symptoms include pain. The pain is worse with sleeping, lying on affected side, and any movement of the joint; ice, heat, and pain medication and/or NSAID improve the pain. Previous treatments have included: NSAIDS.    Subjective   Review of Systems: Review of Systems   Constitutional:  Negative for chills, fever, unexpected weight gain and unexpected weight loss.   HENT:  Negative for congestion, postnasal drip and rhinorrhea.    Eyes:  Negative for blurred vision.   Respiratory:  Negative for shortness of breath.    Cardiovascular:  Negative for leg swelling.   Gastrointestinal:  Negative for abdominal pain, nausea and vomiting.   Genitourinary:  Negative for difficulty urinating.   Musculoskeletal:  Positive for arthralgias. Negative for gait problem, joint swelling and myalgias.   Skin:  Negative for skin lesions and wound.   Neurological:  Negative for dizziness, weakness, light-headedness and numbness.   Hematological:  Does not bruise/bleed easily.   Psychiatric/Behavioral:  Negative for depressed mood.         I have reviewed the following portions of the patient's history:History of Present Illness and review of systems.    Past Medical History:   Past Medical History:   Diagnosis Date    Ankle sprain     Arthritis Around five years ago    Arthritis of back      Asthma     Seasonal    Atrial fibrillation 11/29/23    One event while being monitored.    Cataract     Just removed/April/May 24    CTS (carpal tunnel syndrome)     Extremity pain     Finger amputation, traumatic 1991    h/o L.index finger amp seconary to bleacher injury, s/p failed surgery    Hip arthrosis     Hx of cardiovascular stress test 10/26/2023    nuclear stress test (10/26/23)    Hx of cardiovascular stress test 10/25/2023    nl nuclear stress test (10/25/23): EF 55%; cards - Dr. Hopkins    Hx of carotid ultrasound 10/20/2023    carotid u/s (10/20/23): < 50 % stenoses bilaterally, (+) plaque    Hx of chest x-ray 02/28/2018    CXR (2/28/18): lenticular opacities at bases sugg of atelectasis    Hx of chest x-ray 09/16/2019    CXR (9/16/19): chronic changes of atelectasia dn scarring at right hilar and infrahilar region as well as prior healed    Hx of colonoscopy 06/04/2012    colonosc (6/4/12): polyp, diverticulosis, int hem; GI - Dr. George    Hx of colonoscopy 07/24/2017    colonosc (7/24/17): polyp; GI - Dr. George    Hx of colonoscopy 10/08/2024    colonosc (10/8/24): diverticulosis, int hemorrhoids, no further scfeening; GI - Dr. George    Hx of CT scan of head 10/01/2023    nl CT head (10/1/23, [facility]), sinuses clear    Hx of echocardiogram 10/20/2023    ECHO (10/20/23) EF 56%, mild calcified AV with tr-mild AR, tr TR/AR, neg saline test    Hx of EEG 12/12/2023    nl EEG (12/12/23)    Hx of EMG/NCV BUE/BLE 12/12/2024    EMG/NCV (12/12/24): median neuropathies bilaterally (mild-mod L / mod R); no radiculopathy in arms; nl BLE NCV; EMG shows bilat chronic L5/S1 radiculopathies    Hx of exercise stress test 02/11/2015    nl GXT (2/11/15)    Hx of MRI bilat hips 08/19/2022    MRI hips (8/19/22): mod arthritis changes bilat with diffuse labral tearing; paralabral cysts L>R, no effusion, sclerotic lesions right hemipelvis, indeterminate    Hx of MRI brain 02/09/2020    brain MRI  (2/9/20): Mild chronic small vessel ischemic changes, fluid in mastoid cells c/w chronic paranasal sinusitis    Hx of MRI brain 10/19/2023    MRI brain (10/19/23): perivent changes c/w chronic small vessel ischemic disease; mastoid air cells and paranasal sinuses are clear    Hx of MRI L-spine 06/14/2023    MRI L-spine (6/14/23): multilevel spinal canal and neural foraminal stenosis, severe at L4-5    Hx of MRI lumbar spine 08/19/2022    MRI L-spine (8/19/22): diffuse arthritis changes, severe canal stenosis L4-5, mod-severe left/mod right NFS L5-S1    Hypertension     Joint pain     Knee sprain     Left-sided Bell's palsy 02/10/2020    2/9/20 ER visit - RX valtrex 1000mg BIDx7d and pred 40/20/10, each for 5 days; nl brain MRI except Mild chronic small vessel ischemic changes and fluid in mastoid cells c/w chronic paranasal sinusitis    Low back pain     Lumbosacral disc disease     Neck pain     Sleep apnea 2/29/24    Spinal stenosis     Tear of meniscus of knee     Trochanteric bursitis of right hip 02/08/2017    s/p injection (2/8/17), resolved; ortho - Dr. Trevino    Wrist sprain        Past Surgical History:   Past Surgical History:   Procedure Laterality Date    AMPUTATION DIGIT Left 1991     History of Amputated Index Finger DIP Joint(s) secondary to injury at bleachers; s/p failed surgery x2     BACK SURGERY      BUNIONECTOMY  10/2015    s/p Hudson bunionectomy, 2nd metatarsal osteotomy shortening, arthrodesis (10/15); podiatry - Dr. Davidson    CATARACT EXTRACTION Bilateral 04/15/2024    s/p cataract surgery (L 4/15/24, R 5/6/24); [facility] Dr. Elizabeth    COLONOSCOPY  2024    You already have this information    HAND SURGERY      INTERVENTIONAL RADIOLOGY PROCEDURE N/A 12/12/2024    Procedure: IR myelogram, lumbar;  Surgeon: Alvaro Rojas MD;  Location: [facility] INVASIVE LOC[facility];  Service: Interventional Radiology;  Laterality: N/A;    KNEE SURGERY Left 1985    LAMINECTOMY      LUMBAR LAMINECTOMY DISCECTOMY  DECOMPRESSION Left 2023    Procedure: MIS Laminectomy left-sided approach L4-5 with Bilateral Laminoforaminotomy L4-5;  Surgeon: Hans Valentin MD;  Location: [facility];  Service: Neurosurgery;  Laterality: Left;    LUMBAR SYNOVIAL CYST REMOVAL  Last surgery    METATARSAL OSTEOTOMY      history of buniuon correction with metatarsal osteotomy  s/p Hudson bunionectomty, 2nd metatarsal osteotomy shortening, arthrodesis (10/15);  podiatry - Dr. Davidson    SPINE SURGERY         Family History:   Family History   Problem Relation Age of Onset    Heart attack Mother     Diabetes Mother     Heart disease Mother     Hypertension Mother     Hypertension Father     Heart disease Father     Heart attack Father     Aneurysm Father         AAA    Coronary artery disease Father         CABG    Heart failure Father     Arrhythmia Father         pacemaker    Skin cancer Father     Valvular heart disease Father     Fibromyalgia Father     Hyperlipidemia Sister     Sleep apnea Sister     Hyperlipidemia Sister     Diabetes Sister     Stroke Sister         Caused by shingles    Sleep apnea Sister     Broken bones Sister     Anxiety disorder Sister     Hypertension Brother     Hyperlipidemia Brother     Hypertension Brother     Hyperlipidemia Brother     Heart attack Brother         Passed as a result    Early death Brother         Heart Attack    Arrhythmia Brother         Sick-sinus syndrome.    Heart attack Cousin         pat cousin  age 44    Heart disease Paternal Grandmother     Heart attack Paternal Grandmother         Passed as a result.    Anxiety disorder Sister     Sleep apnea Brother        Social History:   Social History     Socioeconomic History    Marital status:     Number of children: 3   Tobacco Use    Smoking status: Never     Passive exposure: Never    Smokeless tobacco: Never    Tobacco comments:     Dad heavy smoker   Vaping Use    Vaping status: Never Used   Substance and Sexual Activity     Alcohol use: Yes     Alcohol/week: 1.0 standard drink of alcohol     Comment: Maybe once a month    Drug use: No    Sexual activity: Never       Medications:   Current Outpatient Medications:     amLODIPine (NORVASC) 2.5 MG tablet, Take 1 tablet by mouth Daily., Disp: 90 tablet, Rfl: 3    apixaban (Eliquis) 5 MG tablet tablet, Take 1 tablet by mouth 2 (Two) Times a Day., Disp: 180 tablet, Rfl: 3    atorvastatin (LIPITOR) 20 MG tablet, Take 1 tablet by mouth Daily., Disp: 90 tablet, Rfl: 3    bisoprolol (ZEBeta) 5 MG tablet, Take 1 tablet by mouth Daily., Disp: 90 tablet, Rfl: 3    carboxymethylcellulose (REFRESH PLUS) 0.5 % solution, 3 (Three) Times a Day As Needed for Dry Eyes., Disp: , Rfl:     cetirizine (zyrTEC) 10 MG tablet, Take 1 tablet by mouth Daily., Disp: , Rfl:     HE PO, Take  by mouth., Disp: , Rfl:     Cholecalciferol (VITAMIN D) 2000 UNITS capsule, Take 1 capsule by mouth Daily., Disp: , Rfl:     Cinnamon 500 MG tablet, Take 1 tablet by mouth daily., Disp: , Rfl:     ELDERBERRY PO, Take  by mouth., Disp: , Rfl:     fluticasone (FLONASE) 50 MCG/ACT nasal spray, 2 sprays into the nostril(s) as directed by provider Daily., Disp: 16 g, Rfl: 5    Multiple Vitamins-Minerals (CENTRUM SILVER ADULT 50+ PO), Take 1 tablet by mouth Daily., Disp: , Rfl:     Probiotic Product (PROBIOTIC-10 PO), Take  by mouth., Disp: , Rfl:     triamcinolone (KENALOG) 0.025 % cream, , Disp: , Rfl:     Allergies:   Allergies   Allergen Reactions    Cosamin Ds [Glucosamine-Chondroitin] Hives    Erythromycin Hives    Glucosamine-Chondroitin Hives    Naproxen Hives    Sulfa Antibiotics Hives    Sulfamethoxazole Hives    Trimethoprim Hives       I reviewed the patient's chief complaint, history of present illness, review of systems, past medical history, surgical history, family history, social history, medications and allergy list.     Objective    Vital Signs:   Vitals:    04/09/25 1042   BP: 140/78   Weight: 86 kg (189 lb 8 oz)  "  Height: 172.7 cm (67.99\")     Body mass index is 28.82 kg/m².   BMI is >= 25 and <30. (Overweight) The following options were offered after discussion;: exercise counseling/recommendations and nutrition counseling/recommendations      Patient reports that he is a non-smoker and has not ever been a smoker.  This behavior was applauded and he was encouraged to continue in smoking cessation.  We will continue to monitor at subsequent visits.    Ortho Exam:  Cardiovascular System: Arterial Pulses Right: radial normal. Arterial Pulses Left: radial normal.   C-Spine/Neck: Active Range of Motion: no crepitus or pain elicited on motion and flexion normal, extension normal, rotation normal, and lateral flexion normal.   Shoulders: Inspection Right: no misalignment, erythema, induration, swelling, or warmth and AC prominence normal. Inspection Left: no misalignment, erythema, induration, swelling, or warmth and AC prominence normal. Bony Palpation Right: no tenderness of the clavicle, the acromioclavicular joint, the greater tuberosity, or the bicipital groove. Bony Palpation Left: tenderness of the clavicle lateral one-third, the acromioclavicular joint, the greater tuberosity, and the bicipital groove and no tenderness of the sternoclavicular joint. Soft Tissue Palpation Left: tenderness of the supraspinatus, the infraspinatus, the glenohumeral joint region, and the lateral cuff insertion. Active Range of Motion Left: limited. Special Tests Right: Hawkin's test positive and empty can sign positive. Special Tests Left: Hawkin's test positive, Neer's test positive, Galway's test positive, and empty can sign positive.   exam RIGHT shoulder: forward flexion approximately 120 degrees. Abduction 120 degrees. Internal rotation L5. Motor testing supraspinatus 5/5  exam LEFT shoulder: forward flexion approximately 120 degrees. Abduction 120 degrees. Internal rotation SI. Motor testing supraspinatus 5/5    Results Review: "   Imaging Results (Last 24 Hours)       Procedure Component Value Units Date/Time    XR Shoulder 2+ View Bilateral [[id number]] Resulted: 04/09/25 1128     Updated: 04/09/25 1128    Narrative:      Indication: Pain.    Views:AP lateral and scapular Y views of the shoulder are submitted.    Impression: There is no fracture subluxation or dislocation.  Mild   degenerative changes of both glenohumeral joints.  No evidence of calcific   tendinitis. There are no acute findings.    Comparison: None.              Procedures    Assessment / Plan    Assessment/Plan:   Diagnoses and all orders for this visit:    1. Bilateral shoulder pain, unspecified chronicity (Primary)  -     XR Shoulder 2+ View Bilateral    2. Arthritis of both glenohumeral joints  -     Ambulatory Referral to Physical Therapy for Evaluation & Treatment    3. Rotator cuff tendinitis, unspecified laterality  -     Ambulatory Referral to Physical Therapy for Evaluation & Treatment    Plan:  Bilateral anterior  and lateral shoulder pain.  Symptoms worsened by motion.  Radiographs show mild degenerative changes the glenohumeral and AC joints.  He is dealing with arthritic pain as well as rotator cuff tendinitis.  Has an allergy to NSAIDs so we will hold off on any oral medication.  Can use Tylenol extra strength up to 2000 mg/day.  Discussed the role for cortisone injection would hold off today.  Refer to physical therapy.  Follow-up in 6 weeks.    Previous studies reviewed: office visit 3/31/2025. TSH. CMP.    Follow Up:   Return in about 6 weeks (around 5/21/2025) for Recheck.      Alok Morton MD  [facility]

## 2025-04-15 ENCOUNTER — DOCUMENTATION (OUTPATIENT)
Dept: PAIN MEDICINE | Facility: CLINIC | Age: 70
End: 2025-04-15

## 2025-04-15 ENCOUNTER — OUTSIDE FACILITY SERVICE (OUTPATIENT)
Dept: PAIN MEDICINE | Facility: CLINIC | Age: 70
End: 2025-04-15
Payer: MEDICARE

## 2025-04-15 PROCEDURE — 77002 NEEDLE LOCALIZATION BY XRAY: CPT | Performed by: STUDENT IN AN ORGANIZED HEALTH CARE EDUCATION/TRAINING PROGRAM

## 2025-04-15 PROCEDURE — 20610 DRAIN/INJ JOINT/BURSA W/O US: CPT | Performed by: STUDENT IN AN ORGANIZED HEALTH CARE EDUCATION/TRAINING PROGRAM

## 2025-04-15 NOTE — PROGRESS NOTES
[facility]  [address] [place] [address]    PROCEDURE: Fluoroscopy guided bilateral Greater trochanteric bursa injection     PRE-OP DIAGNOSIS: Greater trochanteric bursitis    POST-OP DIAGNOSIS: Greater trochanteric bursitis    ANTIPLATELET/ANTICOAGULANT STOP DATE: n/a   ANTIPLATELET/ANTICOAGULANT RESTART DATE: n/a     CONSENT: Risks, benefits and options were explained to the patient, all questions were answered and written informed consent was obtained.     ANESTHESIA: Local only     PROCEDURE NOTE: After timeout was performed, the patient was placed in the supine position with all pressure points padded and the selected side upward. All proceduralists donned sterile gloves with masks and surgical hats. The area of maximal tenderness was identified and marked. The skin was sterilely prepped with chlorhexidine.  Tender greater trochanter was identified under AP fluoroscopy.  Then a 25 gauge 3.5 inch spinal needle was inserted in the lateral thigh and midline and advanced medially under intermittent fluoroscopy until the target greater trochanteric bone was contacted. The needle was slightly withdrawn 1-2 mm and an injectate containing methylprednisolone 40 mg steroid and 4 ml of bupivacaine 0.5% local anesthetic was administered. The needle was then flushed and removed. Pressure was held and a dressing placed. The same procedure was then repeated on the contralateral side.    EBL: None   COMPLICATIONS: None     The patient tolerated the procedure well. Vital signs were stable. Sensory and motor exam was unchanged from baseline. The patient was observed in recovery and was discharged after meeting established criteria.    FOLLOW UP: As scheduled     ADDITIONAL NOTES:     Codes:    Codes:   08082  40712

## 2025-05-08 NOTE — PROGRESS NOTES
Sleep Clinic Follow Up Note    Chief Complaint  Sleeping Problem and Sleep Apnea (Follow up)    Subjective     History of Present Illness (from previous encounter on 5/14/2024):  Yoseph Granados Jr. is a 69 y.o. male who returns for follow-up and compliance of PAP therapy.  The Pap report has been reviewed.  Overall usage is at 80% with compliance at 74%.  Patient averages 6 hours and 30 minutes of therapy.  Sleep apnea is controlled with an AHI of 5.6/H. He has a history of moderate obstructive sleep apnea with an AHI of 20.4/h. I will refill the patient's supplies, and I have asked them to return for follow-up and compliance in 1 year or sooner should they have further questions or concerns. (End copied text).    Interval History:  Yoseph Granados Jr. is a 70 y.o. male returns for follow up and compliance of PAP therapy. The patient was last seen on 5/14/2024 by me. Overall the patient feels good with regard to therapy. The device appears to be working appropriately. On average the patient sleeps 7.5-8 hours per night. The patient wakes 1-2 times per night.     The patient reports the following changes to their medical and medication history since they were last seen:  Dermatology procedure  Carpal tunnel     Further details are as follows:      Orondo Scale is (out of 24): Total score: 1     Weight:  Current Weight: 185 lb    Weight change in the last year:  gain: 0 lbs    The patient's relevant past medical, surgical, family, and social history reviewed and updated in Epic as appropriate.    PMH:    Past Medical History:   Diagnosis Date    Ankle sprain     Arthritis Around five years ago    Arthritis of back     Asthma     Seasonal    Atrial fibrillation 11/29/23    One event while being monitored.    Cataract     Just removed/April/May 24    CTS (carpal tunnel syndrome)     Extremity pain     Finger amputation, traumatic 1991    h/o L.index finger amp seconary to bleacher injury, s/p failed surgery    Hip  arthrosis     Hx of cardiovascular stress test 10/26/2023    nuclear stress test (10/26/23)    Hx of cardiovascular stress test 10/25/2023    nl nuclear stress test (10/25/23): EF 55%; [facility] - Dr. Hopkins    Hx of carotid ultrasound 10/20/2023    carotid u/s (10/20/23): < 50 % stenoses bilaterally, (+) plaque    Hx of chest x-ray 02/28/2018    CXR (2/28/18): lenticular opacities at bases sugg of atelectasis    Hx of chest x-ray 09/16/2019    CXR (9/16/19): chronic changes of atelectasia dn scarring at right hilar and infrahilar region as well as prior healed    Hx of colonoscopy 06/04/2012    colonosc (6/4/12): polyp, diverticulosis, int hem; GI - Dr. George    Hx of colonoscopy 07/24/2017    colonosc (7/24/17): polyp; GI - Dr. George    Hx of colonoscopy 10/08/2024    colonosc (10/8/24): diverticulosis, int hemorrhoids, no further scfeening; GI - Dr. George    Hx of CT scan of head 10/01/2023    nl CT head (10/1/23, [facility]), sinuses clear    Hx of echocardiogram 10/20/2023    ECHO (10/20/23) EF 56%, mild calcified AV with tr-mild AR, tr TR/WA, neg saline test    Hx of EEG 12/12/2023    nl EEG (12/12/23)    Hx of EMG/NCV BUE/BLE 12/12/2024    EMG/NCV (12/12/24): median neuropathies bilaterally (mild-mod L / mod R); no radiculopathy in arms; nl BLE NCV; EMG shows bilat chronic L5/S1 radiculopathies    Hx of exercise stress test 02/11/2015    nl GXT (2/11/15)    Hx of MRI bilat hips 08/19/2022    MRI hips (8/19/22): mod arthritis changes bilat with diffuse labral tearing; paralabral cysts L>R, no effusion, sclerotic lesions right hemipelvis, indeterminate    Hx of MRI brain 02/09/2020    brain MRI (2/9/20): Mild chronic small vessel ischemic changes, fluid in mastoid cells c/w chronic paranasal sinusitis    Hx of MRI brain 10/19/2023    MRI brain (10/19/23): perivent changes c/w chronic small vessel ischemic disease; mastoid air cells and paranasal sinuses are clear    Hx of MRI L-spine 06/14/2023     MRI L-spine (6/14/23): multilevel spinal canal and neural foraminal stenosis, severe at L4-5    Hx of MRI lumbar spine 08/19/2022    MRI L-spine (8/19/22): diffuse arthritis changes, severe canal stenosis L4-5, mod-severe left/mod right NFS L5-S1    Hypertension     Joint pain     Knee sprain     Left-sided Bell's palsy 02/10/2020    2/9/20 ER visit - RX valtrex 1000mg BIDx7d and pred 40/20/10, each for 5 days; nl brain MRI except Mild chronic small vessel ischemic changes and fluid in mastoid cells c/w chronic paranasal sinusitis    Low back pain     Lumbosacral disc disease     Neck pain     Sleep apnea 2/29/24    Spinal stenosis     Tear of meniscus of knee     Trochanteric bursitis of right hip 02/08/2017    s/p injection (2/8/17), resolved; ortho - Dr. Trevino    Wrist sprain      Past Surgical History:   Procedure Laterality Date    AMPUTATION DIGIT Left 1991     History of Amputated Index Finger DIP Joint(s) secondary to injury at bleachers; s/p failed surgery x2     BACK SURGERY      BUNIONECTOMY  10/2015    s/p Hudson bunionectomy, 2nd metatarsal osteotomy shortening, arthrodesis (10/15); podiatry - Dr. Davidson    CATARACT EXTRACTION Bilateral 04/15/2024    s/p cataract surgery (L 4/15/24, R 5/6/24);  Dr. Elizabeth    COLONOSCOPY  2024    You already have this information    HAND SURGERY      INTERVENTIONAL RADIOLOGY PROCEDURE N/A 12/12/2024    Procedure: IR myelogram, lumbar;  Surgeon: Alvaro Rojas MD;  Location: [facility];  Service: Interventional Radiology;  Laterality: N/A;    KNEE SURGERY Left 1985    LAMINECTOMY      LUMBAR LAMINECTOMY DISCECTOMY DECOMPRESSION Left 07/27/2023    Procedure: MIS Laminectomy left-sided approach L4-5 with Bilateral Laminoforaminotomy L4-5;  Surgeon: Hans Valentin MD;  Location: [facility] OR;  Service: Neurosurgery;  Laterality: Left;    LUMBAR SYNOVIAL CYST REMOVAL  Last surgery    METATARSAL OSTEOTOMY      history of buniuon correction with  metatarsal osteotomy  s/p Hudson bunionectomty, 2nd metatarsal osteotomy shortening, arthrodesis (10/15);  podiatry - Dr. Davidson    SPINE SURGERY         Allergies   Allergen Reactions    Cosamin Ds [Glucosamine-Chondroitin] Hives    Erythromycin Hives    Glucosamine-Chondroitin Hives    Naproxen Hives    Sulfa Antibiotics Hives    Sulfamethoxazole Hives    Trimethoprim Hives       MEDS:  Prior to Admission medications    Medication Sig Start Date End Date Taking? Authorizing Provider   amLODIPine (NORVASC) 2.5 MG tablet Take 1 tablet by mouth Daily. 7/1/24   Armando Rojas MD   apixaban (Eliquis) 5 MG tablet tablet Take 1 tablet by mouth 2 (Two) Times a Day. 3/24/25   Armando Rojas MD   atorvastatin (LIPITOR) 20 MG tablet Take 1 tablet by mouth Daily. 3/26/24   Armando Rojas MD   bisoprolol (ZEBeta) 5 MG tablet Take 1 tablet by mouth Daily. 7/1/24   Armando Rojas MD   carboxymethylcellulose (REFRESH PLUS) 0.5 % solution 3 (Three) Times a Day As Needed for Dry Eyes.    ProviderIan MD   cetirizine (zyrTEC) 10 MG tablet Take 1 tablet by mouth Daily.    ProviderIan MD   HE PO Take  by mouth.    Ian Vargas MD   Cholecalciferol (VITAMIN D) 2000 UNITS capsule Take 1 capsule by mouth Daily. 9/6/13      Cinnamon 500 MG tablet Take 1 tablet by mouth daily. 4/20/15      ELDERBERRY PO Take  by mouth.    Ian Vargas MD   fluticasone (FLONASE) 50 MCG/ACT nasal spray 2 sprays into the nostril(s) as directed by provider Daily. 2/9/21   Gina Adamson MD   Multiple Vitamins-Minerals (CENTRUM SILVER ADULT 50+ PO) Take 1 tablet by mouth Daily. 9/6/13      Probiotic Product (PROBIOTIC-10 PO) Take  by mouth.    Ian Vargas MD   triamcinolone (KENALOG) 0.025 % cream  1/12/23   Ian Vargas MD         FH:  Family History   Problem Relation Age of Onset    Heart attack Mother     Diabetes Mother     Heart disease Mother     Hypertension Mother     Hypertension  "Father     Heart disease Father     Heart attack Father     Aneurysm Father         AAA    Coronary artery disease Father         CABG    Heart failure Father     Arrhythmia Father         pacemaker    Skin cancer Father     Valvular heart disease Father     Fibromyalgia Father     Hyperlipidemia Sister     Sleep apnea Sister     Hyperlipidemia Sister     Diabetes Sister     Stroke Sister         Caused by shingles    Sleep apnea Sister     Broken bones Sister     Anxiety disorder Sister     Hypertension Brother     Hyperlipidemia Brother     Hypertension Brother     Hyperlipidemia Brother     Heart attack Brother         Passed as a result    Early death Brother         Heart Attack    Arrhythmia Brother         Sick-sinus syndrome.    Heart attack Cousin         pat cousin  age 44    Heart disease Paternal Grandmother     Heart attack Paternal Grandmother         Passed as a result.    Anxiety disorder Sister     Sleep apnea Brother        Objective   Vital Signs:  /68 (BP Location: Left arm, Patient Position: Sitting, Cuff Size: Adult)   Pulse 66   Temp 96.8 °F (36 °C) (Temporal)   Ht 172.7 cm (67.99\")   Wt 84 kg (185 lb 1.6 oz)   SpO2 96%   BMI 28.15 kg/m²     Patient's (Body mass index is 28.15 kg/m².) indicates that they are overweight (BMI 25-29.9)          Physical Exam  Vitals reviewed.   Constitutional:       Appearance: Normal appearance.   HENT:      Head: Normocephalic and atraumatic.      Nose: Nose normal.      Mouth/Throat:      Mouth: Mucous membranes are moist.   Cardiovascular:      Rate and Rhythm: Normal rate and regular rhythm.      Heart sounds: No murmur heard.     No friction rub. No gallop.   Pulmonary:      Effort: Pulmonary effort is normal. No respiratory distress.      Breath sounds: Normal breath sounds. No wheezing or rhonchi.   Neurological:      Mental Status: He is alert and oriented to person, place, and time.   Psychiatric:         Behavior: Behavior normal. "               Result Review :           PAP Report:  AHI: 2.4/h  Days of Usage: 75/90 (83%)  Number of Days Greater than 4 hours: 81%  Average time (days used): 7 hours 40 minutes  95th Percentile Pressure: 12.3 cmH2O  95th percentile leaks: 1.5 L/min  Settings: Auto CPAP-8/18 cm H2O, EPR full-time, EPR level 1, response standard.       Assessment and Plan  Yoseph Granados Jr. is a 70 y.o. male who returns for follow-up and compliance of PAP therapy.  The Pap report has been reviewed.  Overall usage is 83% with compliance at 81%.  Patient averaged 7 hours and 40 minutes of therapy.  Sleep apnea is well-controlled with an AHI of 2.4/h. I will refill the patient's supplies, and I have asked them to return for follow-up and compliance in 1 year or sooner should they have further questions or concerns.     Diagnoses and all orders for this visit:    1. Obstructive sleep apnea, adult (Primary)  -     PAP Therapy    2. Overweight (BMI 25.0-29.9)         The patient continues to use and benefit from PAP therapy.    1. The patient was counseled regarding multimodal approach with healthy nutrition, healthy sleep, regular physical activity, social activities, counseling, and medications. Encouraged to practice lateral sleep position. Avoid alcohol and sedatives close to bedtime.     2.  We will refill supplies x1 year.  Return to clinic 1 year or sooner if symptoms warrant. I have reviewed the results of my evaluation and impression and discussed my recommendations in detail with the patient.           Follow Up  Return in about 1 year (around 5/14/2026) for Annual visit.  Patient was given instructions and counseling regarding his condition or for health maintenance advice. Please see specific information pulled into the AVS if appropriate.       ANAID Guzman, [facility]  Pulmonology, Critical Care, and Sleep Medicine

## 2025-05-14 ENCOUNTER — OFFICE VISIT (OUTPATIENT)
Dept: SLEEP MEDICINE | Age: 70
End: 2025-05-14
Payer: MEDICARE

## 2025-05-14 VITALS
WEIGHT: 185.1 LBS | SYSTOLIC BLOOD PRESSURE: 122 MMHG | HEART RATE: 66 BPM | TEMPERATURE: 96.8 F | BODY MASS INDEX: 28.05 KG/M2 | DIASTOLIC BLOOD PRESSURE: 68 MMHG | HEIGHT: 68 IN | OXYGEN SATURATION: 96 %

## 2025-05-14 DIAGNOSIS — E66.3 OVERWEIGHT (BMI 25.0-29.9): ICD-10-CM

## 2025-05-14 DIAGNOSIS — G47.33 OBSTRUCTIVE SLEEP APNEA, ADULT: Primary | ICD-10-CM

## 2025-05-19 NOTE — PROGRESS NOTES
"Chief Complaint   Patient presents with    Impaired fasting glucose    Hypertension       History of Present Illness  70 y.o.  male presents for f/u on sugars and BP. Reports successful injections in the lumbar spine, bilateral hips (bursitis) and SI joints. Now with ongoing issues with shoulder pain. Has seen Dr. Morton and is going to PT. Thinks he will need injections for his shoulders as well.     Inquiring about hand ortho options; has documented bilat CTS, R>L.    Review of Systems  Denies CP, SOB, falls.  ROS (+) for bilat shoulder pain as noted, attributed to OA. ROS (+) for bilat hand pain, numbness/tingling, attributed to CTS. All other ROS reviewed and negative.    Current Outpatient Medications:     amLODIPine 2.5 MG QD    apixaban (Eliquis) 5 MG BID    atorvastatin 20 MG QD    bisoprolol 5 MG QD    carboxymethylcellulose (REFRESH PLUS) 0.5 % soln AD    cetirizine 10 MG QD    CHERRY D    Cholecalciferol (VITAMIN D) 2000 UNITS QD    Cinnamon 500 MG QD    ELDERBERRY QD    fluticasone (FLONASE) 50 MCG/ACT nasal spray AD    CENTRUM SILVER ADULT 50+ QD    Probiotic Product QD    triamcinolone (KENALOG) 0.025 % cream AD      VITALS:  /68   Pulse 56   Ht 170.2 cm (67\")   Wt 83.5 kg (184 lb)   SpO2 96%   BMI 28.82 kg/m²     Physical Exam  Vitals and nursing note reviewed.   Constitutional:       General: He is not in acute distress.     Appearance: Normal appearance. He is not ill-appearing.   Eyes:      Extraocular Movements: Extraocular movements intact.      Conjunctiva/sclera: Conjunctivae normal.   Pulmonary:      Effort: Pulmonary effort is normal. No respiratory distress.   Neurological:      Mental Status: He is alert. Mental status is at baseline.   Psychiatric:         Mood and Affect: Mood normal.         Behavior: Behavior normal.         LABS  Results for orders placed or performed in visit on 05/22/25   POC Glycosylated Hemoglobin (Hb A1C)    Collection Time: 05/22/25  8:39 AM "    Specimen: Blood   Result Value Ref Range    Hemoglobin A1C 6.0 (A) 4.5 - 5.7 %    Lot Number [id number]     Expiration Date 01/17/2027 11/18/24 A1C 5.9    ASSESSMENT/PLAN    Diagnoses and all orders for this visit:    1. Impaired fasting glucose (Primary)  Assessment & Plan:  BG control stable with A1C 6.0; encouraged reg phys activity to decr insulin resistance, moderation in unhealthy starches/sweets; f/u A1C in 6 mos with next wellness    Orders:  -     POC Glycosylated Hemoglobin (Hb A1C)    2. Hypertension  Assessment & Plan:  BP stable 122/68; cont amlo 2.5mg QD and bisoprolol 5mg QD,      3. Bilateral carpal tunnel syndrome  Assessment & Plan:  Symptomatic R>L; ref Dr. Bobby, Dr. Vicente, Dr. Anthony, Adele, Karuna, or [place]      4. Osteoarthritis of both shoulders  Assessment & Plan:  Ongoing PT, discussed importance of HEP and goal of functional pain, not complete pain relief; f/u Dr. Morton as scheduled          FOLLOW-UP  Health maintenance - flu vacc and COVID19 vacc done for this season (reviewed option to get 2nd dose of COVID19 vacc at this time); RSV vacc prev completed  RTC for AWV 11/21/25; fasting labs prior to appt (CBC, CMP, TSH, lipids, UA/micro, PSA, A1C, microalb/Cr)     Electronically signed by:    Gina Adamson MD, FACP  05/22/2025

## 2025-05-19 NOTE — ASSESSMENT & PLAN NOTE
BG control stable with A1C 6.0; encouraged reg phys activity to decr insulin resistance, moderation in unhealthy starches/sweets; f/u A1C in 6 mos with next wellness

## 2025-05-21 ENCOUNTER — OFFICE VISIT (OUTPATIENT)
Age: 70
End: 2025-05-21
Payer: MEDICARE

## 2025-05-21 VITALS
DIASTOLIC BLOOD PRESSURE: 82 MMHG | BODY MASS INDEX: 28.08 KG/M2 | SYSTOLIC BLOOD PRESSURE: 144 MMHG | HEIGHT: 68 IN | WEIGHT: 185.3 LBS

## 2025-05-21 DIAGNOSIS — M19.012 ARTHRITIS OF BOTH ACROMIOCLAVICULAR JOINTS: Primary | ICD-10-CM

## 2025-05-21 DIAGNOSIS — M19.011 ARTHRITIS OF BOTH ACROMIOCLAVICULAR JOINTS: Primary | ICD-10-CM

## 2025-05-21 PROBLEM — L82.0 INFLAMED SEBORRHEIC KERATOSIS: Chronic | Status: ACTIVE | Noted: 2025-04-01

## 2025-05-21 NOTE — PROGRESS NOTES
[facility] Follow Up Visit     Office Follow Up      Date: 05/21/2025   Patient Name: Yoseph Granados Jr.  MRN: [id number]  YOB: 1955    Referring Physician: No ref. provider found     Chief Complaint:   Chief Complaint   Patient presents with    Follow-up     6 week recheck Bilateral shoulder pain, unspecified chronicity; Arthritis of both glenohumeral joints; Rotator cuff tendinitis, unspecified laterality       History of Present Illness: Yoseph Granados Jr. is a 70 y.o. male who returns to clinic today for follow up on bilateral shoulder pain. His pain is a 4 /10 on the pain scale. Patient has tried the following previous treatments physical therapy  and injections in the lower back & bilateral hips and date  4/15/2025. He mentions current symptoms of pain , popping, stiffness, and numbness / tingling. He states that these treatments have stayed the same.      Subjective     Review of Systems: Review of Systems   Constitutional: Negative.  Negative for chills, fatigue and fever.   HENT: Negative.  Negative for congestion and dental problem.    Eyes: Negative.  Negative for blurred vision.   Respiratory: Negative.  Negative for shortness of breath.    Cardiovascular: Negative.  Negative for leg swelling.   Gastrointestinal: Negative.  Negative for abdominal pain.   Endocrine: Negative.  Negative for polyuria.   Genitourinary: Negative.  Negative for difficulty urinating.   Musculoskeletal:  Positive for arthralgias.   Skin: Negative.    Allergic/Immunologic: Negative.    Neurological: Negative.    Hematological: Negative.  Negative for adenopathy.   Psychiatric/Behavioral: Negative.  Negative for behavioral problems.         Medications:   Current Outpatient Medications:     amLODIPine (NORVASC) 2.5 MG tablet, Take 1 tablet by mouth Daily., Disp: 90 tablet, Rfl: 3    apixaban (Eliquis) 5 MG tablet tablet, Take 1 tablet by mouth 2 (Two)  "Times a Day., Disp: 180 tablet, Rfl: 3    atorvastatin (LIPITOR) 20 MG tablet, Take 1 tablet by mouth Daily., Disp: 90 tablet, Rfl: 3    bisoprolol (ZEBeta) 5 MG tablet, Take 1 tablet by mouth Daily., Disp: 90 tablet, Rfl: 3    carboxymethylcellulose (REFRESH PLUS) 0.5 % solution, 3 (Three) Times a Day As Needed for Dry Eyes., Disp: , Rfl:     cetirizine (zyrTEC) 10 MG tablet, Take 1 tablet by mouth Daily., Disp: , Rfl:     HE PO, Take  by mouth., Disp: , Rfl:     Cholecalciferol (VITAMIN D) 2000 UNITS capsule, Take 1 capsule by mouth Daily., Disp: , Rfl:     Cinnamon 500 MG tablet, Take 1 tablet by mouth daily., Disp: , Rfl:     ELDERBERRY PO, Take  by mouth., Disp: , Rfl:     fluticasone (FLONASE) 50 MCG/ACT nasal spray, 2 sprays into the nostril(s) as directed by provider Daily., Disp: 16 g, Rfl: 5    Multiple Vitamins-Minerals (CENTRUM SILVER ADULT 50+ PO), Take 1 tablet by mouth Daily., Disp: , Rfl:     Probiotic Product (PROBIOTIC-10 PO), Take  by mouth., Disp: , Rfl:     triamcinolone (KENALOG) 0.025 % cream, , Disp: , Rfl:     Allergies:   Allergies   Allergen Reactions    Cosamin Ds [Glucosamine-Chondroitin] Hives    Erythromycin Hives    Glucosamine-Chondroitin Hives    Naproxen Hives    Sulfa Antibiotics Hives    Sulfamethoxazole Hives    Trimethoprim Hives       I have reviewed and updated the patient's chief complaint, history of present illness, review of systems, past medical history, surgical history, family history, social history, medications and allergy list as appropriate.     Objective      Vital Signs:   Vitals:    05/21/25 1043   BP: 144/82   Weight: 84.1 kg (185 lb 4.8 oz)   Height: 172.7 cm (67.99\")     Body mass index is 28.18 kg/m².  BMI is >= 25 and <30. (Overweight) The following options were offered after discussion;: exercise counseling/recommendations and nutrition counseling/recommendations      Patient reports that he is a non-smoker and has not ever been a smoker.  This " behavior was applauded and he was encouraged to continue in smoking cessation.  We will continue to monitor at subsequent visits.     Ortho Exam:  Cardiovascular System: Arterial Pulses Right: radial normal. Arterial Pulses Left: radial normal.   C-Spine/Neck: Active Range of Motion: no crepitus or pain elicited on motion and flexion normal, extension normal, rotation normal, and lateral flexion normal.   Shoulders: Inspection Right: no misalignment, erythema, induration, swelling, or warmth and AC prominence normal. Inspection Left: no misalignment, erythema, induration, swelling, or warmth and AC prominence normal. Bony Palpation Right: no tenderness of the clavicle, the acromioclavicular joint, the greater tuberosity, or the bicipital groove. Bony Palpation Left: tenderness of the clavicle lateral one-third, the acromioclavicular joint, the greater tuberosity, and the bicipital groove and no tenderness of the sternoclavicular joint. Soft Tissue Palpation Left: tenderness of the supraspinatus, the infraspinatus, the glenohumeral joint region, and the lateral cuff insertion. Active Range of Motion Left: limited. Special Tests Right: Hawkin's test positive and empty can sign positive. Special Tests Left: Hawkin's test positive, Neer's test positive, Musselshell's test positive, and empty can sign positive.   exam RIGHT shoulder: forward flexion approximately 120 degrees. Abduction 120 degrees. Internal rotation L5. Motor testing supraspinatus 5/5  exam LEFT shoulder: forward flexion approximately 120 degrees. Abduction 120 degrees. Internal rotation SI. Motor testing supraspinatus 5/5    Results Review:   Imaging Results (Last 24 Hours)       ** No results found for the last 24 hours. **            Procedures    Assessment / Plan      Assessment/Plan:   Diagnoses and all orders for this visit:    1. Arthritis of both acromioclavicular joints (Primary)    Plan:  Most tender today over AC joints of the shoulder previous  radiographs showed arthritic changes there.  Improved since last visit.  Had trochanteric bursa corticosteroid injections with Dr. Decker and the steroid has helped his shoulder.  Also in therapy at [facility].  Has more sessions planned.    Recommend continue with therapy.  Advance activities as tolerated.  Follow-up for injections if symptoms not improve or worsen.      Follow Up:   Return if symptoms worsen or fail to improve.      Alok Morton MD  [facility]

## 2025-05-22 ENCOUNTER — OFFICE VISIT (OUTPATIENT)
Dept: INTERNAL MEDICINE | Facility: CLINIC | Age: 70
End: 2025-05-22
Payer: MEDICARE

## 2025-05-22 VITALS
SYSTOLIC BLOOD PRESSURE: 122 MMHG | HEIGHT: 67 IN | HEART RATE: 56 BPM | WEIGHT: 184 LBS | OXYGEN SATURATION: 96 % | DIASTOLIC BLOOD PRESSURE: 68 MMHG | BODY MASS INDEX: 28.88 KG/M2

## 2025-05-22 DIAGNOSIS — M19.012 PRIMARY OSTEOARTHRITIS OF BOTH SHOULDERS: ICD-10-CM

## 2025-05-22 DIAGNOSIS — G56.03 BILATERAL CARPAL TUNNEL SYNDROME: Chronic | ICD-10-CM

## 2025-05-22 DIAGNOSIS — M19.011 PRIMARY OSTEOARTHRITIS OF BOTH SHOULDERS: ICD-10-CM

## 2025-05-22 DIAGNOSIS — R73.01 IMPAIRED FASTING GLUCOSE: Primary | Chronic | ICD-10-CM

## 2025-05-22 DIAGNOSIS — I10 ESSENTIAL HYPERTENSION: Chronic | ICD-10-CM

## 2025-05-22 LAB
EXPIRATION DATE: ABNORMAL
HBA1C MFR BLD: 6 % (ref 4.5–5.7)
Lab: ABNORMAL

## 2025-05-22 NOTE — ASSESSMENT & PLAN NOTE
Ongoing PT, discussed importance of HEP and goal of functional pain, not complete pain relief; f/u Dr. Morton as scheduled

## 2025-05-29 ENCOUNTER — PATIENT MESSAGE (OUTPATIENT)
Dept: INTERNAL MEDICINE | Facility: CLINIC | Age: 70
End: 2025-05-29
Payer: MEDICARE

## 2025-05-29 ENCOUNTER — PATIENT MESSAGE (OUTPATIENT)
Dept: PAIN MEDICINE | Facility: CLINIC | Age: 70
End: 2025-05-29
Payer: MEDICARE

## 2025-05-29 DIAGNOSIS — G56.03 BILATERAL CARPAL TUNNEL SYNDROME: Primary | Chronic | ICD-10-CM

## 2025-06-04 ENCOUNTER — TELEPHONE (OUTPATIENT)
Dept: ORTHOPEDIC SURGERY | Facility: CLINIC | Age: 70
End: 2025-06-04
Payer: MEDICARE

## 2025-06-04 ENCOUNTER — TELEPHONE (OUTPATIENT)
Age: 70
End: 2025-06-04
Payer: MEDICARE

## 2025-06-04 NOTE — TELEPHONE ENCOUNTER
Provider: DR. FLORES    Caller: Yoseph Granados Jr.    Relationship to Patient: Self    Phone Number: [phone]    Reason for Call: PATIENT CALLED WANTING TO SPEAK WITH SOMEONE REGARDING BILATERAL SHOULDER INJECTIONS. ALSO WANTS TO SPEAK TO SOMEONE REGARDING HOW OFTEN HE'S BEEN GETTING THESE INJECTIONS. WANTS TO MAKE SURE HE'S NOT GETTING THEM TOO OFTEN. PATIENT IS ALSO RECEIVING INJECTIONS THROUGH PAIN MANAGEMENT. PLEASE ADVISE.

## 2025-06-04 NOTE — TELEPHONE ENCOUNTER
I let pt know that  would do both shoulders tomorrow- both AC joints. Pt verbalized understanding.    Kerry Mota CMA ([facility])

## 2025-06-04 NOTE — TELEPHONE ENCOUNTER
Pt states he has had injections with pain management 2 times in different body parts. Over last week steroids have wore off. He has taken 1000 mg of tylenol as directed still having pain reaching up.Physical therapy didn't help much. Pt is wanting to get injections. I did inform pt between body parts  prefers at least 2 weeks between body parts. Pt was told by his PCP to ask so he doesn't have too much to cause issue with his blood sugar. Last injection was 4/15/25. He said that did help hi his sugars have been around normal. I told him it is 3 months between injections in the same body part. I let patient know I would ask  to verify that he is okay to do both at once due to his blood sugar I did inform him that he may only want to start with one shoulder or 1/2 what he normally gives in the injection. He said both shoulders hurt equally. His wife is having surgery next week and he didn't want to get them and not be able to help her. I did tell him it would probably be beneficial to consider getting them this week if possible so his soreness can be wore off before her surgery and hopefully helping some by that point and informed him of  being out next week. Scheduled pt tomorrow at 1:20pm.     Kerry Mota CMA ([facility])

## 2025-06-05 ENCOUNTER — OFFICE VISIT (OUTPATIENT)
Dept: ORTHOPEDIC SURGERY | Facility: CLINIC | Age: 70
End: 2025-06-05
Payer: MEDICARE

## 2025-06-05 ENCOUNTER — CLINICAL SUPPORT (OUTPATIENT)
Age: 70
End: 2025-06-05
Payer: MEDICARE

## 2025-06-05 VITALS
BODY MASS INDEX: 28.88 KG/M2 | SYSTOLIC BLOOD PRESSURE: 132 MMHG | DIASTOLIC BLOOD PRESSURE: 82 MMHG | HEIGHT: 67 IN | WEIGHT: 184 LBS

## 2025-06-05 DIAGNOSIS — M19.011 ARTHRITIS OF BOTH ACROMIOCLAVICULAR JOINTS: Primary | ICD-10-CM

## 2025-06-05 DIAGNOSIS — M19.012 ARTHRITIS OF BOTH ACROMIOCLAVICULAR JOINTS: Primary | ICD-10-CM

## 2025-06-05 DIAGNOSIS — G56.03 BILATERAL CARPAL TUNNEL SYNDROME: Primary | Chronic | ICD-10-CM

## 2025-06-05 RX ORDER — LIDOCAINE HYDROCHLORIDE 10 MG/ML
1 INJECTION, SOLUTION EPIDURAL; INFILTRATION; INTRACAUDAL; PERINEURAL
Status: COMPLETED | OUTPATIENT
Start: 2025-06-05 | End: 2025-06-05

## 2025-06-05 RX ORDER — TRIAMCINOLONE ACETONIDE 40 MG/ML
40 INJECTION, SUSPENSION INTRA-ARTICULAR; INTRAMUSCULAR
Status: COMPLETED | OUTPATIENT
Start: 2025-06-05 | End: 2025-06-05

## 2025-06-05 RX ADMIN — TRIAMCINOLONE ACETONIDE 40 MG: 40 INJECTION, SUSPENSION INTRA-ARTICULAR; INTRAMUSCULAR at 13:15

## 2025-06-05 RX ADMIN — LIDOCAINE HYDROCHLORIDE 1 ML: 10 INJECTION, SOLUTION EPIDURAL; INFILTRATION; INTRACAUDAL; PERINEURAL at 13:15

## 2025-06-05 NOTE — PROGRESS NOTES
"                                                                 [facility]     Office Visit       Date: 06/05/2025   Patient Name: Yoseph Granados Jr.  MRN: [id number]  YOB: 1955    Referring Physician: Gina Adamson MD     Chief Complaint:   Chief Complaint   Patient presents with    Right Hand - Initial Evaluation    Left Hand - Initial Evaluation     History of Present Illness:   Yoseph Granados Jr. is a 70 y.o. male right-hand-dominant to the clinic as a new patient complaints of bilateral hand pain, numbness and tingling.  He has had symptoms for the past 7 months or so.  He endorses pain, numbness and tingling that initially began at night causing him to wake and shake his hands.  He attempted intermittent to bracing with some improvements.  He now feels that his symptoms are noticeable throughout the day with certain activities.  He feels that the numbness and tingling affects all digits of the hand.  No prior injections.  He presents with an EMG.  He has had a previous left index finger partial amputation performed 34 years ago removal.  No other complaints or concerns.    Pmh: Right bundle branch block, dyslipidemia, hypertension, atrial fibrillation, diverticulosis.    Subjective   Review of Systems:   Review of Systems   Pertinent review of systems per HPI    I reviewed the patient's chief complaint, history of present illness, review of systems, past medical history, surgical history, family history, social history, medications and allergy list in the EMR on 06/05/2025 and agree with the findings above.    Objective    Vital Signs:   Vitals:    06/05/25 0957   BP: 132/82   Weight: 83.5 kg (184 lb)   Height: 170.2 cm (67.01\")     General: No acute distress. Alert and oriented.   Cardiovascular: Palpable radial pulse.   Respiratory: Breathing is nonlabored.   Ortho Exam:  Examination of the bilateral Upper Extremity:  No skin lesions or ecchymosis. No edema.    " Interosseous wasting is not visualized   Thenar atrophy is not visualized     Hypothenar atrophy is not visualized     positive Tinel's at the carpal tunnel, positive Durkan's compression test, positive Phalen's maneuver   negative Tinel's over Guyon's canal   negative Tinel's at the ulnar nerve at the elbow   negative Elbow Flexion Test   negative Subluxation of the ulnar nerve   negative tenderness with deep palpation of the pronator   negative Tinel's with median nerve at pronator   5/5 APB strength   5/5 FDI strength   Sensation is decreased to light touch over the median nerve distribution   Digits warm and well-perfused      Imaging / Studies:    Imaging Results (Last 24 Hours)       ** No results found for the last 24 hours. 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 is a 70 y.o. male with bilateral carpal tunnel syndrome.    I discussed with the patient their clinical and electrodiagnostic findings demonstrate carpal tunnel syndrome.  The pathophysiology of the condition, including compression of the median nerve in the carpal tunnel, was explained in detail.  It was also discussed that with more severe symptomatology, such as persistent and worsening paresthesias, that permanent nerve damage may result, which may make improvement after surgery less predictable.  Both conservative and surgical options were discussed.  Conservative treatments in the form of: observation, gentle nerve gliding exercises, night time splinting, and injection were presented.  Operative treatment in the form of open carpal tunnel release was presented and reiterated that the goal of surgery  is to prevent further compression and damage to the nerve. After expressing understanding of all options, the patient elects to proceed with nighttime bracing and gentle nerve glides.  Hold off on injections today but is interested in this in the future.  I will see him back in 2 months for reevaluation.  They were agreeable with the plan.  All questions and concerns were addressed.        ICD-10-CM ICD-9-CM   1. Bilateral carpal tunnel syndrome  G56.03 354.0     Follow Up:   Return in about 2 months (around 8/5/2025) for Follow Up.      Nicolle Bobby MD  [facility]

## 2025-06-05 NOTE — PROGRESS NOTES
Procedure   - Medium Joint Arthrocentesis: bilateral acromioclavicular on 6/5/2025 1:15 PM  Indications: pain  Details: (23G ) needle, ultrasound-guided superior approach  Medications (Right): 1 mL lidocaine PF 1% 1 %; 40 mg triamcinolone acetonide 40 MG/ML  Medications (Left): 40 mg triamcinolone acetonide 40 MG/ML; 1 mL lidocaine PF 1% 1 %  Outcome: tolerated well, no immediate complications  Procedure, treatment alternatives, risks and benefits explained, specific risks discussed. Consent was given by the patient. Immediately prior to procedure a time out was called to verify the correct patient, procedure, equipment, support staff and site/side marked as required. Patient was prepped and draped in the usual sterile fashion.          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  Patient is here for ultrasound-guided bilateral AC joint corticosteroid injection.  Previous office documentation and images were personally reviewed prior to the visit.  We discussed them in detail how they correlate to experienced symptoms.  I explained the procedure in detail.  I answered all questions to the best my ability.  Risks and benefits as well as post procedure instructions were provided.  Patient elected to proceed and tolerated this procedure well.  See procedure note.  Follow-up with me will be on an as-needed basis.

## 2025-06-19 RX ORDER — ATORVASTATIN CALCIUM 20 MG/1
20 TABLET, FILM COATED ORAL DAILY
Qty: 90 TABLET | Refills: 3 | Status: SHIPPED | OUTPATIENT
Start: 2025-06-19

## 2025-06-19 RX ORDER — AMLODIPINE BESYLATE 2.5 MG/1
2.5 TABLET ORAL DAILY
Qty: 90 TABLET | Refills: 3 | Status: SHIPPED | OUTPATIENT
Start: 2025-06-19

## 2025-06-30 ENCOUNTER — OFFICE VISIT (OUTPATIENT)
Dept: PAIN MEDICINE | Facility: CLINIC | Age: 70
End: 2025-06-30
Payer: MEDICARE

## 2025-06-30 VITALS — WEIGHT: 184 LBS | BODY MASS INDEX: 28.88 KG/M2 | HEIGHT: 67 IN

## 2025-06-30 DIAGNOSIS — M25.511 CHRONIC PAIN OF BOTH SHOULDERS: ICD-10-CM

## 2025-06-30 DIAGNOSIS — G89.29 CHRONIC PAIN OF BOTH SHOULDERS: ICD-10-CM

## 2025-06-30 DIAGNOSIS — M48.062 SPINAL STENOSIS OF LUMBAR REGION WITH NEUROGENIC CLAUDICATION: ICD-10-CM

## 2025-06-30 DIAGNOSIS — M70.61 GREATER TROCHANTERIC BURSITIS OF BOTH HIPS: Primary | ICD-10-CM

## 2025-06-30 DIAGNOSIS — M25.512 CHRONIC PAIN OF BOTH SHOULDERS: ICD-10-CM

## 2025-06-30 DIAGNOSIS — G56.03 BILATERAL CARPAL TUNNEL SYNDROME: Chronic | ICD-10-CM

## 2025-06-30 DIAGNOSIS — M70.62 GREATER TROCHANTERIC BURSITIS OF BOTH HIPS: Primary | ICD-10-CM

## 2025-06-30 PROCEDURE — 1160F RVW MEDS BY RX/DR IN RCRD: CPT

## 2025-06-30 PROCEDURE — 1159F MED LIST DOCD IN RCRD: CPT

## 2025-06-30 PROCEDURE — 99214 OFFICE O/P EST MOD 30 MIN: CPT

## 2025-06-30 PROCEDURE — 1125F AMNT PAIN NOTED PAIN PRSNT: CPT

## 2025-06-30 PROCEDURE — G2211 COMPLEX E/M VISIT ADD ON: HCPCS

## 2025-06-30 NOTE — PROGRESS NOTES
05/31/2023      Referring Physician: No referring provider defined for this encounter.    Primary Physician: Gina Adamson MD    CHIEF COMPLAINT or REASON FOR VISIT: Follow-up      HISTORY OF PRESENT ILLNESS (INITIAL HPI 5/31/2023):  Mr. Yoseph Granados is 70 y.o. male who presents as a new patient referral for evaluation treatment chronic low back and radiation bilateral lower extremities (left greater than right).  Patient states that he first experienced symptoms approximately 5 years ago when he had acute onset of left-sided lumbar radiculopathy/sciatica pain which resolved with an epidural steroid injection.  He was in his normal state of health walking up to 5 to 6 miles daily and biking frequently when approximately 1 year ago without any inciting event or trauma he developed a new onset low back pain with radiation into his lower extremities.  Pain is  exacerbated by ambulation, prolonged standing and ameliorated with rest and sitting down.  He is now limited to where he can only walk approximately 1 mile.  He denies any bowel or bladder dysfunction.  He describes a numbness tingling and burning pain down the posterior aspect of his buttocks and legs into the feet.  He has seen Dr. Guillaume, orthopedic spine surgery, for this issue and was told that he would likely require surgery.  He has undergone 2 lumbar epidural steroid injections with Dr. Guillaume which were only mildly beneficial for a few days.  He did trial gabapentin 300 mg with no benefit and subsequently discontinued.  He has tried NSAIDs, physical therapy with mild benefit.    Interval history:  Patient returns to clinic today after undergoing bilateral greater trochanter bursa steroid injections.  He has been evaluated by orthopedics, Dr. Morton, who identified arthritis of both AC joints, and performed bilateral shoulder injections with good relief.  Additionally, he has been evaluated by hand surgery, Dr. Bobby, who identified bilateral  carpal tunnel syndrome.  Today, he continues to complain of chronic low back pain with radiation to left lower extremities as well as chronic bilateral lateral thigh pain.  Overall his symptoms are somewhat controlled at the moment.  He does have an upcoming trip to [place] in August and is a bit concerned about the amount of walking and activities.  He does follow-up with neurosurgery, Cleve Paz PA-C on 7/10/2025.  He has been evaluated by Dr. Hans Valentin for and was identified as a neurosurgical candidate.    Interventions:  1/9/2025: L3/4 LESI with minimal relief  2/18/2025: Bilateral SIJ with minimal relief  4/15/2025: Bilateral GTB with 90% relief for 40 days    Objective Pain Scoring:   BRIEF PAIN INVENTORY:  Total score:   Pain Score    06/30/25 1338   PainSc: 5    PainLoc: Hip          PHQ-2:    PHQ-9:    Opioid Risk Tool:         Review of Systems:   ROS negative except as otherwise noted     Past Medical History:   Past Medical History:   Diagnosis Date    Extremity pain     Finger amputation, traumatic 1991    h/o L.index finger amp seconary to bleacher injury, s/p failed surgery    Hx of cardiovascular stress test 10/26/2023    nuclear stress test (10/26/23)    Hx of carotid ultrasound 10/20/2023    carotid u/s (10/20/23): < 50 % stenoses bilaterally, (+) plaque    Hx of chest x-ray 02/28/2018    CXR (2/28/18): lenticular opacities at bases sugg of atelectasis    Hx of chest x-ray 09/16/2019    CXR (9/16/19): chronic changes of atelectasia dn scarring at right hilar and infrahilar region as well as prior healed    Hx of colonoscopy 06/04/2012    colonosc (6/4/12): polyp, diverticulosis, int hem; GI - Dr. George    Hx of colonoscopy 07/24/2017    colonosc (7/24/17): polyp; GI - Dr. George    Hx of colonoscopy 10/08/2024    colonosc (10/8/24): diverticulosis, int hemorrhoids, no further scfeening; GI - Dr. George    Hx of CT scan of head 10/01/2023    nl CT head (10/1/23, [facility]), sinuses clear    Hx of echocardiogram 10/20/2023    ECHO (10/20/23) EF 56%, mild calcified AV with tr-mild AR, tr TR/CA, neg saline test    Hx of EEG 12/12/2023    nl EEG (12/12/23)    Hx of EMG/NCV BUE/BLE 12/12/2024    EMG/NCV (12/12/24): median neuropathies bilaterally (mild-mod L / mod R); no radiculopathy in arms; nl BLE NCV; EMG shows bilat chronic L5/S1 radiculopathies    Hx of exercise stress test 02/11/2015    nl GXT (2/11/15)    Hx of MRI bilat hips 08/19/2022    MRI hips (8/19/22): mod arthritis changes bilat with diffuse labral tearing; paralabral cysts L>R, no effusion, sclerotic lesions right hemipelvis, indeterminate    Hx of MRI brain 02/09/2020    brain MRI (2/9/20): Mild chronic small vessel ischemic changes, fluid in mastoid cells c/w chronic paranasal sinusitis    Hx of MRI brain 10/19/2023    MRI brain (10/19/23): perivent changes c/w chronic small vessel ischemic disease; mastoid air cells and paranasal sinuses are clear    Hx of MRI L-spine 06/14/2023    MRI L-spine (6/14/23): multilevel spinal canal and neural foraminal stenosis, severe at L4-5    Hx of MRI lumbar spine 08/19/2022    MRI L-spine (8/19/22): diffuse arthritis changes, severe canal stenosis L4-5, mod-severe left/mod right NFS L5-S1    Joint pain     Left-sided Bell's palsy 02/10/2020    2/9/20 ER visit - RX valtrex 1000mg BIDx7d and pred 40/20/10, each for 5 days; nl brain MRI except Mild chronic small vessel ischemic changes and fluid in mastoid cells c/w chronic paranasal sinusitis    Low back pain     Lumbosacral disc disease     Neck pain     Spinal stenosis     Trochanteric bursitis of right hip 02/08/2017    s/p injection (2/8/17), resolved; ortho - Dr. Trevino         Past Surgical History:   Past Surgical History:   Procedure Laterality Date    AMPUTATION DIGIT Left 1991     History of Amputated Index Finger DIP Joint(s) secondary to injury at bleachers; s/p failed surgery x2     BACK SURGERY      BUNIONECTOMY   10/2015    s/p Hudson bunionectomy, 2nd metatarsal osteotomy shortening, arthrodesis (10/15); podiatry - Dr. Davidson    CATARACT EXTRACTION Bilateral 04/15/2024    s/p cataract surgery (L 4/15/24, R 24);  Dr. Elizabeth    INTERVENTIONAL RADIOLOGY PROCEDURE N/A 2024    Procedure: IR myelogram, lumbar;  Surgeon: Alvaro Rojas MD;  Location:  [facility];  Service: Interventional Radiology;  Laterality: N/A;    KNEE SURGERY Left     LAMINECTOMY      LUMBAR LAMINECTOMY DISCECTOMY DECOMPRESSION Left 2023    Procedure: MIS Laminectomy left-sided approach L4-5 with Bilateral Laminoforaminotomy L4-5;  Surgeon: Hans Valentin MD;  Location:  [facility];  Service: Neurosurgery;  Laterality: Left;    METATARSAL OSTEOTOMY      history of buniuon correction with metatarsal osteotomy  s/p Hudson bunionectomty, 2nd metatarsal osteotomy shortening, arthrodesis (10/15);  podiatry - Dr. Davidson    SPINE SURGERY           Family History   Family History   Problem Relation Age of Onset    Heart attack Mother     Diabetes Mother     Heart disease Mother     Hypertension Mother     Hypertension Father     Heart disease Father     Heart attack Father     Aneurysm Father         AAA    Coronary artery disease Father         CABG    Heart failure Father     Arrhythmia Father         pacemaker    Skin cancer Father     Valvular heart disease Father     Fibromyalgia Father     Hyperlipidemia Sister     Sleep apnea Sister     Hyperlipidemia Sister     Diabetes Sister     Stroke Sister         Caused by shingles    Sleep apnea Sister     Broken bones Sister     Anxiety disorder Sister     Hypertension Brother     Hyperlipidemia Brother     Hypertension Brother     Hyperlipidemia Brother     Heart attack Brother         Passed as a result    Early death Brother         Heart Attack    Arrhythmia Brother         Sick-sinus syndrome.    Heart attack Cousin         pat cousin  age 44    Heart disease  Paternal Grandmother     Heart attack Paternal Grandmother         Passed as a result.    Anxiety disorder Sister     Sleep apnea Brother          Social History   Social History     Socioeconomic History    Marital status:     Number of children: 3   Tobacco Use    Smoking status: Never     Passive exposure: Never    Smokeless tobacco: Never    Tobacco comments:     Dad heavy smoker   Vaping Use    Vaping status: Never Used   Substance and Sexual Activity    Alcohol use: Yes     Alcohol/week: 1.0 standard drink of alcohol     Comment: Maybe once a month    Drug use: No    Sexual activity: Never        Medications:     Current Outpatient Medications:     amLODIPine (NORVASC) 2.5 MG tablet, Take 1 tablet by mouth Daily., Disp: 90 tablet, Rfl: 3    apixaban (Eliquis) 5 MG tablet tablet, Take 1 tablet by mouth 2 (Two) Times a Day., Disp: 180 tablet, Rfl: 3    atorvastatin (LIPITOR) 20 MG tablet, Take 1 tablet by mouth Daily., Disp: 90 tablet, Rfl: 3    bisoprolol (ZEBeta) 5 MG tablet, Take 1 tablet by mouth Daily., Disp: 90 tablet, Rfl: 3    carboxymethylcellulose (REFRESH PLUS) 0.5 % solution, 3 (Three) Times a Day As Needed for Dry Eyes., Disp: , Rfl:     cetirizine (zyrTEC) 10 MG tablet, Take 1 tablet by mouth Daily., Disp: , Rfl:     HE PO, Take  by mouth., Disp: , Rfl:     Cholecalciferol (VITAMIN D) 2000 UNITS capsule, Take 1 capsule by mouth Daily., Disp: , Rfl:     Cinnamon 500 MG tablet, Take 1 tablet by mouth daily., Disp: , Rfl:     ELDERBERRY PO, Take  by mouth., Disp: , Rfl:     fluticasone (FLONASE) 50 MCG/ACT nasal spray, 2 sprays into the nostril(s) as directed by provider Daily., Disp: 16 g, Rfl: 5    Multiple Vitamins-Minerals (CENTRUM SILVER ADULT 50+ PO), Take 1 tablet by mouth Daily., Disp: , Rfl:     Probiotic Product (PROBIOTIC-10 PO), Take  by mouth., Disp: , Rfl:     triamcinolone (KENALOG) 0.025 % cream, , Disp: , Rfl:         Physical Exam:     Vitals:    06/30/25 1338   Weight:  "83.5 kg (184 lb)   Height: 170.2 cm (67.01\")   PainSc: 5    PainLoc: Hip            General: Alert and oriented, No acute distress.   HEENT: Normocephalic, atraumatic.   Cardiovascular: No gross edema  Respiratory: Respirations are non-labored    Lumbar Spine:   No masses or atrophy  Range of motion - Flexion normal. Extension normal. Right Lat Bending normal. Left Lat Bending normal  Facet Loading: Negative bilaterally  Facet Palpation - Nontender   PSIS tenderness -positive bilaterally  Lino's/KAYCEE/Thigh thrust/John finger/Gaenslen-positive bilaterally  Straight leg raise: Positive bilaterally  Slump test: Positive bilaterally  Lower extremity strength 5 out of 5 bilaterally    Bilateral GTB tender to palpation    Shoulder exam:  Range of motion: Normal  Neer: Positive bilaterally  Suprascapular liftoff: Positive bilaterally  Supraspinatus empty can: Positive bilaterally  Short Gilbert: Positive bilaterally    Motor Exam:    Strength: Rate on 1-5 scale Right Left    L1/2- hip flexion 5 5    L3- knee extension 5 5    L4- ankle dorsiflexion 5 5    L5- great toe extension 5 5    S1- ankle plantarflexion 5 5    Sensory Exam: Full and equal sensation to light touch throughout.    Neurologic: Cranial Nerves II-XII are grossly intact.   Psychiatric: Cooperative.   Gait: Antalgic flexed forward  Assistive Devices: None    Imaging Studies:   MRI LUMBAR SPINE W WO CONTRAST     Date of Exam: 11/19/2024 1:31 PM EST     Indication: neurogenic lul.     Comparison: 6/14/2023.     Technique:  Routine multiplanar/multisequence sequence images of the lumbar spine were obtained before and after the uneventful administration of 15 mL Multihance.       Findings:   There is evidence of prior left hemilaminotomy at L4-5. Some minimally edematous spondylotic endplate changes are also noted at this level. T1 marrow signal is otherwise preserved, without evidence of fracture or suspicious marrow replacing lesion. There   is some " stable mild retrolisthesis of L5 on S1, without additional listhesis or subluxation. There is no unexpected abnormal enhancement. The conus medullaris and cauda equina nerve roots are satisfactory in appearance. The paraspinal soft tissues   demonstrate no acute or suspicious findings. Multilevel spondylosis is present, with areas of involvement including     L1-2, no significant spinal canal or neuroforaminal impingement.     L2-3, small disc bulge with bilateral facet arthropathy. There is mild spinal canal narrowing. The neural foramina are patent bilaterally.      L3-4, small disc bulge with some ligamentum flavum thickening and bilateral facet arthropathy. There is mild to moderate spinal canal narrowing and mild right neuroforaminal stenosis.     L4-5, Postoperative changes from interval left hemilaminectomy with previously noted focal disc extrusion no longer present. There is a small amount of abnormal soft tissue seen along the posterior margin of the disc space, with heterogeneous   enhancement, favoring scar tissue over recurrent or persistent disc protrusion. Left lateral component of disc protrusion persists, with some associated mild to moderate narrowing of the left neural foramen. There are 2 areas of nonenhancing fluid signal   appearing somewhat circumscribed and immediately adjacent to the facet joints on image 19 of series 6, possibly representing facet synovial cysts. There is resultant moderate to severe narrowing of the spinal canal, mildly improved from comparison.     L5-S1, small disc bulge with ligamentum flavum thickening and bilateral facet arthropathy. There is mild spinal canal narrowing and moderate bilateral neuroforaminal stenosis, similar to comparison.     IMPRESSION:  Impression:   Postoperative changes noted at L4-5 from interval left hemilaminotomy and discectomy with previously noted focal disc extrusion no longer apparent. Some enhancing scar tissue is noted without evidence  of recurrent disc herniation. This level does however   demonstrate some component of left lateral disc protrusion which is persistent and results in mild to moderate narrowing of the left neural foramen. Persistent ligamentum flavum thickening and suspected small new bilateral facet synovial cysts result in   overall moderate to severe narrowing at the L4-5 level. Adjacent level spondylosis is otherwise similar without new high-grade spinal canal or neuroforaminal impingement.        Electronically Signed: Sukhwinder Moore MD    11/20/2024 1:17 PM EST    Workstation ID: [id number]      CT LUMBAR SPINE W INTRATHECAL CONTRAST     Date of Exam: 12/12/2024 8:48 AM EST     Indication: low back pain, neurogenic claudication.     Comparison: MRI of the lumbar spine 11/19/2024     Technique: Axial sections were obtained of the lumbar spine with reformatted images following the injection of intrathecal contrast. The localizer images are reviewed.        Findings:  OSSEOUS STRUCTURES: No fracture nor bony destructive process.     ALIGNMENT:  There is 4 mm retrolisthesis of L5 on S1.     DISC SPACE: There is multilevel disc space narrowing more prominent at L5-S1.     JOINTS: There is multilevel lumbar facet joint arthropathy.     SOFT TISSUES:  Unremarkable     LEVELS:      L1-L2: No significant disc bulge or canal stenosis. The AP diameter of the thecal sac is 14 mm. The neural foramen are patent.     L2-L3: Disc degeneration and disc space narrowing with a broad-based disc bulge extending 3 mm posterior to the vertebral body. There is mild bilateral facet joint arthropathy. No significant canal stenosis. The AP diameter of the thecal sac is 10 mm.   There is mild bilateral neural foraminal narrowing.     L3-L4: Broad-based disc bulge extending 2.5 mm posterior to the vertebral body and mild bilateral facet joint hypertrophy. There is mild to moderate canal stenosis. The AP diameter of the thecal sac is 7 mm. There is mild to  moderate bilateral neural   foraminal narrowing.      L4-L5: Disc degeneration and disc space narrowing are noted with a broad-based disc bulge/endplate osteophyte complex and soft tissue density centrally posterior to the disc space and extending superiorly. This could represent scar tissue or extruded   disc material. There is moderate bilateral facet arthropathy with postsurgical changes of a left hemilaminotomy. There are bilateral juxta articular synovial cysts, narrowing the thecal sac in the transverse diameter to approximately 5 mm. There is a   triangular shaped appearance of the thecal sac which measures 13 mm in the AP direction. There is at least moderate bilateral neural foraminal narrowing. There is some clumping of the nerve roots centrally within the thecal sac at this level.     L5-S1: Disc degeneration and advanced disc space narrowing with broad-based disc bulge/endplate osteophyte complex and moderate to marked bilateral facet arthropathy. There is no canal stenosis. There is severe bilateral neural foraminal narrowing. The   AP diameter of the thecal sac is 13 mm.     IMPRESSION:  Impression:     1. Multilevel lumbar degenerative disc disease and facet arthropathy.  2. Postsurgical changes of a left hemilaminotomy at L4-L5 with soft tissue density centrally posterior to the disc space and extending superiorly, this may represent postoperative scarring versus a small superiorly migrated extrusion. There does appear   to be a triangular shaped appearance of the thecal sac at this level which is significantly narrowed in the transverse direction. There is some clumping of the nerve roots centrally which may be due to arachnoiditis.  3. Mild to moderate L3-L4 spinal canal stenosis.  4. Multilevel neural foraminal narrowing up to severe bilaterally at the L5-S1 level.           Electronically Signed: Christiana Lucio MD    12/16/2024 10:03 AM EST    Workstation ID: [id number]    Independent  interpretation of radiographic imaging:  Lumbar MRI dated 11/19/2024 demonstrates: Postoperative changes from prior left L4/5 hemilaminectomy; spinal canal stenosis at L3/4 without significant NFS; spinal canal stenosis most significant at L4/5; bilateral facet synovial cyst at L4/5; bilateral NFS at L5/S1; facet arthropathy; paraspinal multifidus atrophy      Impression & Plan:   Mr. Yoseph Granados is a 70 y.o. male with past medical history significant for right BBB, HTN, HLD who presents to the pain clinic for evaluation and treatment of chronic low back pain with radiation bilateral lower extremities.  I personally reviewed his lumbar MRI dated August 19, 2022 which demonstrates severe L4/5 canal stenosis secondary to herniated nucleus pulposus and ligamentum flavum hypertrophy/facet spondylosis.  Posterior disc bulge at L2/3 causes moderate canal stenosis.  Clinical examination was consistent with lumbar spinal stenosis with neurogenic claudication.  Given the failure of conservative measures including gabapentin, epidural steroid injection, physical therapy I think he would be best served by surgical decompression.  We did briefly discuss minimally invasive lumbar decompression (mild procedure) however I think he would have better outcome with a proper laminectomy.  Will refer to neurosurgery.  12/30/2024: Lumbar MRI reviewed.  CT myelogram report reviewed.  Symptoms consistent with lumbar spinal stenosis with neurogenic claudication.  Will plan for L3/4 LESI; above previous surgical site.  He is anticoagulated with Eliquis for atrial fibrillation  2/05/2025: Minimal relief from LESI.  Offered lumbar interbody fusion by Dr. Hans Valentin MD.  Most of the pain today appears to be within bilateral buttocks.  Evaluation consistent with bilateral sacroiliac joint dysfunction.  Will plan for bilateral SIJ.  3/31/2025: Minimal relief from SIJ.  Evaluation consistent with spinal stenosis with neurogenic  claudication, chronic shoulder pain, greater trochanter bursitis.  Refer to Ortho.  Recommend follow-up with neurosurgery.  2025: Recommend continued follow-up with orthopedics for bilateral shoulder pain and bilateral carpal tunnel syndrome.  May consider repeat bilateral GTB injections.  Continue follow-up with neurosurgery      1. Greater trochanteric bursitis of both hips    2. Spinal stenosis of lumbar region with neurogenic claudication    3. Chronic pain of both shoulders    4. Bilateral carpal tunnel syndrome                PLAN:  1. Medication Recommendations: Recommend Voltaren topical, NSAIDs, Tylenol.  Can trial turmeric 500 mg twice daily if NSAID contraindicated.  Gabapentin ineffective, can consider pregabalin    2. Physical Therapy: Continue HEP    3. Psychological: defer    4. Complementary and alternative (CAM) Therapies:     5. Labs: None indicated     6. Imagin. Interventions: May consider repeat bilateral GTB injections recommend him to follow-up with neurosurgery regarding spinal stenosis with neurogenic claudication as he has been offered an interbody fusion.    8. Referrals:     9. Records requested: neurosurgery notes reviewed; orthopedic notes reviewed; hand surgery notes reviewed    10. Lifestyle goals:    Follow-up 4-5 months; sooner if clinically dictated    [facility]  Tiara Lagos PA-C        Quality metrics:  Yoseph Mcpherson Roberta Delgado. reports a pain score of 5.  Given his pain assessment as noted, treatment options were discussed and the following options were decided upon as a follow-up plan to address the patient's pain: continuation of current treatment plan for pain.

## 2025-07-10 ENCOUNTER — OFFICE VISIT (OUTPATIENT)
Dept: NEUROSURGERY | Facility: CLINIC | Age: 70
End: 2025-07-10
Payer: MEDICARE

## 2025-07-10 VITALS — HEIGHT: 68 IN | WEIGHT: 185.5 LBS | TEMPERATURE: 97.5 F | BODY MASS INDEX: 28.11 KG/M2

## 2025-07-10 DIAGNOSIS — M48.062 SPINAL STENOSIS OF LUMBAR REGION WITH NEUROGENIC CLAUDICATION: ICD-10-CM

## 2025-07-10 DIAGNOSIS — M51.16 LUMBAR DISC HERNIATION WITH RADICULOPATHY: Primary | ICD-10-CM

## 2025-07-10 PROCEDURE — 99214 OFFICE O/P EST MOD 30 MIN: CPT | Performed by: PHYSICIAN ASSISTANT

## 2025-07-10 NOTE — PROGRESS NOTES
Patient: Yoseph Granados Jr.  : 1955    Primary Care Provider: Gina Adamson MD      Chief Complaint: Hand numbness, shoulder pain, low back pain, neurogenic claudication    History of Present Illness:       Patient is a very nice 70-year-old gentleman who is known to us for undergoing a left-sided approach minimally invasive laminectomy with bilateral neuroforaminal decompressions.  Procedures performed in  and patient did well following.    Patient has always had a little bit of low back pain but had some progressive pains that were looked into with a myelogram.  Patient has some increase stenosis at the L4-5 level when in a standing position on the myelogram.  3 4 is also affected but lesser severe.    Patient saw Dr. Valentin last and had recently received an injection and was doing relatively well.    Patient has noticed good relief with all of the injections that he has gotten to in his low back 1 in his SI joints 1 set in his bilateral bursa and all have helped him pretty significantly.    Patient has also received injections in his shoulders.  All the seem to help but the effects tend to wear off as time passes.  Patient is currently doing relatively well and has that he sits and is reasonably comfortable but is expecting the effects of the injection to wear off.    Encouraged him to continue on with pain management as long as it continues to help him until the frequency of the injections become too close together and we can always consider the L4-5 fusion that Dr. Valentin had mentioned last visit    Review of Systems   Constitutional:  Negative for activity change, appetite change, chills, diaphoresis, fatigue, fever and unexpected weight change.   HENT:  Negative for congestion, dental problem, drooling, ear discharge, ear pain, facial swelling, hearing loss, mouth sores, nosebleeds, postnasal drip, rhinorrhea, sinus pressure, sinus pain, sneezing, sore throat, tinnitus, trouble swallowing and  voice change.    Eyes:  Negative for photophobia, pain, discharge, redness, itching and visual disturbance.   Respiratory:  Negative for apnea, cough, choking, chest tightness, shortness of breath, wheezing and stridor.    Cardiovascular:  Negative for chest pain, palpitations and leg swelling.   Gastrointestinal:  Negative for abdominal distention, abdominal pain, anal bleeding, blood in stool, constipation, diarrhea, nausea, rectal pain and vomiting.   Endocrine: Negative for cold intolerance, heat intolerance, polydipsia, polyphagia and polyuria.   Genitourinary:  Negative for decreased urine volume, difficulty urinating, dysuria, enuresis, flank pain, frequency, genital sores, hematuria, penile discharge, penile pain, penile swelling, scrotal swelling, testicular pain and urgency.   Musculoskeletal:  Positive for arthralgias and back pain. Negative for gait problem, joint swelling, myalgias, neck pain and neck stiffness.   Skin:  Negative for color change, pallor, rash and wound.   Allergic/Immunologic: Negative for environmental allergies, food allergies and immunocompromised state.   Neurological:  Positive for weakness. Negative for dizziness, tremors, seizures, syncope, facial asymmetry, speech difficulty, light-headedness, numbness and headaches.   Hematological:  Negative for adenopathy. Does not bruise/bleed easily.   Psychiatric/Behavioral:  Negative for agitation, behavioral problems, confusion, decreased concentration, dysphoric mood, hallucinations, self-injury, sleep disturbance and suicidal ideas. The patient is not nervous/anxious and is not hyperactive.    All other systems reviewed and are negative.      Past Medical History:     Past Medical History:   Diagnosis Date    Arthritis     Asthma     CTS (carpal tunnel syndrome)     Extremity pain     Finger amputation, traumatic 1991    h/o L.index finger amp seconary to bleacher injury, s/p failed surgery    Hx of cardiovascular stress test  10/26/2023    nuclear stress test (10/26/23)    Hx of carotid ultrasound 10/20/2023    carotid u/s (10/20/23): < 50 % stenoses bilaterally, (+) plaque    Hx of chest x-ray 02/28/2018    CXR (2/28/18): lenticular opacities at bases sugg of atelectasis    Hx of chest x-ray 09/16/2019    CXR (9/16/19): chronic changes of atelectasia dn scarring at right hilar and infrahilar region as well as prior healed    Hx of colonoscopy 06/04/2012    colonosc (6/4/12): polyp, diverticulosis, int hem; GI - Dr. George    Hx of colonoscopy 07/24/2017    colonosc (7/24/17): polyp; GI - Dr. George    Hx of colonoscopy 10/08/2024    colonosc (10/8/24): diverticulosis, int hemorrhoids, no further scfeening; GI - Dr. George    Hx of CT scan of head 10/01/2023    nl CT head (10/1/23, [facility]), sinuses clear    Hx of echocardiogram 10/20/2023    ECHO (10/20/23) EF 56%, mild calcified AV with tr-mild AR, tr TR/VA, neg saline test    Hx of EEG 12/12/2023    nl EEG (12/12/23)    Hx of EMG/NCV BUE/BLE 12/12/2024    EMG/NCV (12/12/24): median neuropathies bilaterally (mild-mod L / mod R); no radiculopathy in arms; nl BLE NCV; EMG shows bilat chronic L5/S1 radiculopathies    Hx of exercise stress test 02/11/2015    nl GXT (2/11/15)    Hx of MRI bilat hips 08/19/2022    MRI hips (8/19/22): mod arthritis changes bilat with diffuse labral tearing; paralabral cysts L>R, no effusion, sclerotic lesions right hemipelvis, indeterminate    Hx of MRI brain 02/09/2020    brain MRI (2/9/20): Mild chronic small vessel ischemic changes, fluid in mastoid cells c/w chronic paranasal sinusitis    Hx of MRI brain 10/19/2023    MRI brain (10/19/23): perivent changes c/w chronic small vessel ischemic disease; mastoid air cells and paranasal sinuses are clear    Hx of MRI L-spine 06/14/2023    MRI L-spine (6/14/23): multilevel spinal canal and neural foraminal stenosis, severe at L4-5    Hx of MRI lumbar spine 08/19/2022    MRI L-spine (8/19/22):  diffuse arthritis changes, severe canal stenosis L4-5, mod-severe left/mod right NFS L5-S1    Hyperlipidemia     Hypertension     Joint pain     Left-sided Bell's palsy 02/10/2020    2/9/20 ER visit - RX valtrex 1000mg [id number] and pred 40/20/10, each for 5 days; nl brain MRI except Mild chronic small vessel ischemic changes and fluid in mastoid cells c/w chronic paranasal sinusitis    Low back pain     Lumbosacral disc disease     Neck pain     Spinal stenosis     Trochanteric bursitis of right hip 2017    s/p injection (17), resolved; ortho - Dr. Trevino       Family History:     Family History   Problem Relation Age of Onset    Heart attack Mother     Diabetes Mother     Heart disease Mother     Hypertension Mother     Hypertension Father     Heart disease Father     Heart attack Father     Aneurysm Father         AAA    Coronary artery disease Father         CABG    Heart failure Father     Arrhythmia Father         pacemaker    Skin cancer Father     Valvular heart disease Father     Fibromyalgia Father     Hyperlipidemia Sister     Sleep apnea Sister     Hyperlipidemia Sister     Diabetes Sister     Stroke Sister         Caused by shingles    Sleep apnea Sister     Broken bones Sister     Anxiety disorder Sister     Hypertension Brother     Hyperlipidemia Brother     Hypertension Brother     Hyperlipidemia Brother     Heart attack Brother         Passed as a result    Early death Brother         Heart Attack    Arrhythmia Brother         Sick-sinus syndrome.    Heart attack Cousin         pat cousin  age 44    Heart disease Paternal Grandmother     Heart attack Paternal Grandmother         Passed as a result.    Anxiety disorder Sister     Sleep apnea Brother        Social History:    reports that he has never smoked. He has never been exposed to tobacco smoke. He has never used smokeless tobacco. He reports current alcohol use of about 1.0 standard drink of alcohol per week. He reports that he does  "not use drugs.   SMOKING STATUS: Non-smoker    Surgical History:     Past Surgical History:   Procedure Laterality Date    AMPUTATION DIGIT Left 1991     History of Amputated Index Finger DIP Joint(s) secondary to injury at bleachers; s/p failed surgery x2     BACK SURGERY      BUNIONECTOMY  10/2015    s/p [place] bunionectomy, 2nd metatarsal osteotomy shortening, arthrodesis (10/15); podiatry - Dr. Davidson    CATARACT EXTRACTION Bilateral 04/15/2024    s/p cataract surgery (L 4/15/24, R 5/6/24);  Dr. Elizabeth    INTERVENTIONAL RADIOLOGY PROCEDURE N/A 12/12/2024    Procedure: IR myelogram, lumbar;  Surgeon: Alvaro Rojas MD;  Location: [facility];  Service: Interventional Radiology;  Laterality: N/A;    KNEE SURGERY Left 1985    LAMINECTOMY      LUMBAR LAMINECTOMY DISCECTOMY DECOMPRESSION Left 07/27/2023    Procedure: MIS Laminectomy left-sided approach L4-5 with Bilateral Laminoforaminotomy L4-5;  Surgeon: Hans Valentin MD;  Location: [facility] OR;  Service: Neurosurgery;  Laterality: Left;    METATARSAL OSTEOTOMY      history of buniuon correction with metatarsal osteotomy  s/p Hudson bunionectomty, 2nd metatarsal osteotomy shortening, arthrodesis (10/15);  podiatry - Dr. Davidson    SPINE SURGERY         Allergies:   Cosamin ds [glucosamine-chondroitin], Erythromycin, Glucosamine-chondroitin, Naproxen, Sulfa antibiotics, Sulfamethoxazole, and Trimethoprim    Physical Exam:    Vital Signs:Temp 97.5 °F (36.4 °C) (Temporal)   Ht 172.7 cm (68\")   Wt 84.1 kg (185 lb 8 oz)   BMI 28.21 kg/m²    BMI: Body mass index is 28.21 kg/m².       Incision:   The incision is well-healed and well approximated.  No signs of infection, bleeding, or erythema.    Musculoskeletal:                                            Dorsiflexion is 5/5 Bilaterally                    Plantarflexion is 5/5 bilaterally                    Hip Flexion 5/5 bilaterally.                        Neurologic:                          "                The patient is alert and oriented by 3.                       Sensation is equal bilaterally with no deficit.                        Reflexes:  2+ throughout       Medical Decision Making    Data Review:   No new films reviewed at this visit    Diagnosis:   Lumbar spinal stenosis  Bilateral carpal tunnel  Bilateral shoulder pain  Chronic low back pain  Status post L4-5 laminectomy    Treatment Options:   From a neurosurgical perspective patient should hold tight.  Currently as he says he is doing reasonably well as far as his pain goes.  If the pain were to failed to have durable relief with injections we could always consider the fusion that Dr. Valentin had advised last time.    Patient also understands that the injections in his SI joints and his bilateral bursa's would not likely be beneficial if it were not pathology at those locations.    Patient is sophisticated enough to understand what we would be able to achieve with his lumbar spine and he is verbalized his understanding.       Diagnosis Plan   1. Lumbar disc herniation with radiculopathy        2. Spinal stenosis of lumbar region with neurogenic claudication

## 2025-07-29 RX ORDER — BISOPROLOL FUMARATE 5 MG/1
5 TABLET, FILM COATED ORAL DAILY
Qty: 90 TABLET | Refills: 3 | Status: SHIPPED | OUTPATIENT
Start: 2025-07-29

## 2025-08-06 ENCOUNTER — OFFICE VISIT (OUTPATIENT)
Age: 70
End: 2025-08-06
Payer: MEDICARE

## 2025-08-06 VITALS
SYSTOLIC BLOOD PRESSURE: 134 MMHG | DIASTOLIC BLOOD PRESSURE: 82 MMHG | WEIGHT: 186.7 LBS | HEIGHT: 68 IN | BODY MASS INDEX: 28.3 KG/M2

## 2025-08-06 DIAGNOSIS — M19.011 ARTHRITIS OF BOTH ACROMIOCLAVICULAR JOINTS: Primary | ICD-10-CM

## 2025-08-06 DIAGNOSIS — M75.80 ROTATOR CUFF TENDINITIS, UNSPECIFIED LATERALITY: ICD-10-CM

## 2025-08-06 DIAGNOSIS — M19.012 ARTHRITIS OF BOTH ACROMIOCLAVICULAR JOINTS: Primary | ICD-10-CM

## 2025-08-06 RX ORDER — LIDOCAINE HYDROCHLORIDE 10 MG/ML
4 INJECTION, SOLUTION EPIDURAL; INFILTRATION; INTRACAUDAL; PERINEURAL
Status: COMPLETED | OUTPATIENT
Start: 2025-08-06 | End: 2025-08-06

## 2025-08-06 RX ORDER — BUPIVACAINE HYDROCHLORIDE 5 MG/ML
4 INJECTION, SOLUTION EPIDURAL; INTRACAUDAL; PERINEURAL
Status: COMPLETED | OUTPATIENT
Start: 2025-08-06 | End: 2025-08-06

## 2025-08-06 RX ORDER — DEXAMETHASONE SODIUM PHOSPHATE 4 MG/ML
8 INJECTION, SOLUTION INTRA-ARTICULAR; INTRALESIONAL; INTRAMUSCULAR; INTRAVENOUS; SOFT TISSUE
Status: COMPLETED | OUTPATIENT
Start: 2025-08-06 | End: 2025-08-06

## 2025-08-06 RX ADMIN — LIDOCAINE HYDROCHLORIDE 4 ML: 10 INJECTION, SOLUTION EPIDURAL; INFILTRATION; INTRACAUDAL; PERINEURAL at 10:00

## 2025-08-06 RX ADMIN — BUPIVACAINE HYDROCHLORIDE 4 ML: 5 INJECTION, SOLUTION EPIDURAL; INTRACAUDAL; PERINEURAL at 10:00

## 2025-08-06 RX ADMIN — DEXAMETHASONE SODIUM PHOSPHATE 8 MG: 4 INJECTION, SOLUTION INTRA-ARTICULAR; INTRALESIONAL; INTRAMUSCULAR; INTRAVENOUS; SOFT TISSUE at 10:00

## 2025-08-11 ENCOUNTER — OFFICE VISIT (OUTPATIENT)
Dept: ORTHOPEDIC SURGERY | Facility: CLINIC | Age: 70
End: 2025-08-11
Payer: MEDICARE

## 2025-08-11 VITALS
DIASTOLIC BLOOD PRESSURE: 72 MMHG | BODY MASS INDEX: 28.19 KG/M2 | WEIGHT: 186 LBS | SYSTOLIC BLOOD PRESSURE: 140 MMHG | HEIGHT: 68 IN

## 2025-08-11 DIAGNOSIS — G56.03 BILATERAL CARPAL TUNNEL SYNDROME: Primary | Chronic | ICD-10-CM

## 2025-08-11 RX ORDER — DEXAMETHASONE SODIUM PHOSPHATE 4 MG/ML
2 INJECTION, SOLUTION INTRA-ARTICULAR; INTRALESIONAL; INTRAMUSCULAR; INTRAVENOUS; SOFT TISSUE
Status: COMPLETED | OUTPATIENT
Start: 2025-08-11 | End: 2025-08-11

## 2025-08-11 RX ORDER — LIDOCAINE HYDROCHLORIDE 10 MG/ML
0.5 INJECTION, SOLUTION EPIDURAL; INFILTRATION; INTRACAUDAL; PERINEURAL
Status: COMPLETED | OUTPATIENT
Start: 2025-08-11 | End: 2025-08-11

## 2025-08-11 RX ADMIN — DEXAMETHASONE SODIUM PHOSPHATE 2 MG: 4 INJECTION, SOLUTION INTRA-ARTICULAR; INTRALESIONAL; INTRAMUSCULAR; INTRAVENOUS; SOFT TISSUE at 10:10

## 2025-08-11 RX ADMIN — LIDOCAINE HYDROCHLORIDE 0.5 ML: 10 INJECTION, SOLUTION EPIDURAL; INFILTRATION; INTRACAUDAL; PERINEURAL at 10:10

## (undated) DEVICE — PCH INST SURG INVISISHIELD 2PCKT

## (undated) DEVICE — GLV SURG BIOGEL LTX PF 8

## (undated) DEVICE — SPNG GZ WOVN 4X4IN 12PLY 10/BX STRL

## (undated) DEVICE — ADHS LIQ MASTISOL 2/3ML

## (undated) DEVICE — ANTIBACTERIAL UNDYED BRAIDED (POLYGLACTIN 910), SYNTHETIC ABSORBABLE SUTURE: Brand: COATED VICRYL

## (undated) DEVICE — SCRB SURG BACTOSHIELD CHG 4PCT 4OZ

## (undated) DEVICE — SOL ISO/ALC RUB 70PCT 4OZ

## (undated) DEVICE — GLV SURG PREMIERPRO MIC LTX PF SZ8 BRN

## (undated) DEVICE — STRIP,CLOSURE,WOUND,MEDI-STRIP,1/2X4: Brand: MEDLINE

## (undated) DEVICE — PATIENT RETURN ELECTRODE, SINGLE-USE, CONTACT QUALITY MONITORING, ADULT, WITH 9FT CORD, FOR PATIENTS WEIGING OVER 33LBS. (15KG): Brand: MEGADYNE

## (undated) DEVICE — NEEDLE, QUINCKE 22GX3.5": Brand: MEDLINE INDUSTRIES, INC.

## (undated) DEVICE — C-ARM: Brand: UNBRANDED

## (undated) DEVICE — APPL CHLORAPREP TINTED 26ML TEAL

## (undated) DEVICE — TRY L/P SFTY A/20G

## (undated) DEVICE — HDRST INTUB GENTLETOUCH SLOT 7IN RT

## (undated) DEVICE — TOOL MR8-T12MH25M MR8 12CM T M 2FL 2.5MM: Brand: MIDAS REX MR8

## (undated) DEVICE — DRSNG WND BORDR/ADHS NONADHR/GZ LF 4X4IN STRL

## (undated) DEVICE — SUT VIC 0 UR6 27IN VCP603H

## (undated) DEVICE — ELECTRD BLD EZ CLN MOD 6.5IN

## (undated) DEVICE — NDL SPINE 22G 31/2IN BLK

## (undated) DEVICE — STRAP POSTN KN/BDY FM 5X72IN DISP

## (undated) DEVICE — PAD ARMBRD SURG CONVOL 7.5X20X2IN

## (undated) DEVICE — DRP MICROSCOPE 4 BINOCULAR CV 54X150IN

## (undated) DEVICE — Device

## (undated) DEVICE — CABL BIPOL MEGADYNE 12FT DISP

## (undated) DEVICE — PK NEURO DISC 10

## (undated) DEVICE — SYR CONTRL PRESS/LO FIX/M/LL W/THMB/RNG 10ML